# Patient Record
Sex: FEMALE | Race: WHITE | NOT HISPANIC OR LATINO | Employment: OTHER | URBAN - METROPOLITAN AREA
[De-identification: names, ages, dates, MRNs, and addresses within clinical notes are randomized per-mention and may not be internally consistent; named-entity substitution may affect disease eponyms.]

---

## 2017-01-04 ENCOUNTER — COMMUNICATION - HEALTHEAST (OUTPATIENT)
Dept: INTERNAL MEDICINE | Facility: CLINIC | Age: 67
End: 2017-01-04

## 2017-01-04 DIAGNOSIS — F41.9 ANXIETY: ICD-10-CM

## 2017-01-25 ENCOUNTER — OFFICE VISIT - HEALTHEAST (OUTPATIENT)
Dept: INTERNAL MEDICINE | Facility: CLINIC | Age: 67
End: 2017-01-25

## 2017-01-25 DIAGNOSIS — M24.549: ICD-10-CM

## 2017-02-10 ENCOUNTER — COMMUNICATION - HEALTHEAST (OUTPATIENT)
Dept: INTERNAL MEDICINE | Facility: CLINIC | Age: 67
End: 2017-02-10

## 2017-02-10 DIAGNOSIS — I10 ESSENTIAL HYPERTENSION: ICD-10-CM

## 2017-02-18 ENCOUNTER — COMMUNICATION - HEALTHEAST (OUTPATIENT)
Dept: INTERNAL MEDICINE | Facility: CLINIC | Age: 67
End: 2017-02-18

## 2017-02-18 DIAGNOSIS — H69.93 DYSFUNCTION OF BOTH EUSTACHIAN TUBES: ICD-10-CM

## 2017-03-02 ENCOUNTER — RECORDS - HEALTHEAST (OUTPATIENT)
Dept: ADMINISTRATIVE | Facility: OTHER | Age: 67
End: 2017-03-02

## 2017-03-10 ENCOUNTER — COMMUNICATION - HEALTHEAST (OUTPATIENT)
Dept: INTERNAL MEDICINE | Facility: CLINIC | Age: 67
End: 2017-03-10

## 2017-03-10 DIAGNOSIS — E11.9 DIABETES MELLITUS TYPE 2, CONTROLLED (H): ICD-10-CM

## 2017-04-05 ENCOUNTER — RECORDS - HEALTHEAST (OUTPATIENT)
Dept: ADMINISTRATIVE | Facility: OTHER | Age: 67
End: 2017-04-05

## 2017-05-30 ENCOUNTER — COMMUNICATION - HEALTHEAST (OUTPATIENT)
Dept: INTERNAL MEDICINE | Facility: CLINIC | Age: 67
End: 2017-05-30

## 2017-05-30 DIAGNOSIS — E11.9 TYPE II DIABETES MELLITUS (H): ICD-10-CM

## 2017-06-05 ENCOUNTER — OFFICE VISIT - HEALTHEAST (OUTPATIENT)
Dept: INTERNAL MEDICINE | Facility: CLINIC | Age: 67
End: 2017-06-05

## 2017-06-05 DIAGNOSIS — I10 ESSENTIAL HYPERTENSION: ICD-10-CM

## 2017-06-05 DIAGNOSIS — E78.00 PURE HYPERCHOLESTEROLEMIA: ICD-10-CM

## 2017-06-05 DIAGNOSIS — E11.9 TYPE 2 DIABETES MELLITUS WITHOUT COMPLICATION (H): ICD-10-CM

## 2017-06-05 DIAGNOSIS — E89.0 OTHER POSTABLATIVE HYPOTHYROIDISM: ICD-10-CM

## 2017-06-05 LAB — HBA1C MFR BLD: 5.9 % (ref 3.5–6)

## 2017-07-11 ENCOUNTER — COMMUNICATION - HEALTHEAST (OUTPATIENT)
Dept: INTERNAL MEDICINE | Facility: CLINIC | Age: 67
End: 2017-07-11

## 2017-07-11 DIAGNOSIS — E11.9 DIABETES MELLITUS TYPE 2, CONTROLLED (H): ICD-10-CM

## 2017-07-21 ENCOUNTER — COMMUNICATION - HEALTHEAST (OUTPATIENT)
Dept: INTERNAL MEDICINE | Facility: CLINIC | Age: 67
End: 2017-07-21

## 2017-07-21 DIAGNOSIS — E03.9 HYPOTHYROIDISM: ICD-10-CM

## 2017-07-28 ENCOUNTER — HOSPITAL ENCOUNTER (OUTPATIENT)
Dept: MAMMOGRAPHY | Facility: HOSPITAL | Age: 67
Discharge: HOME | End: 2017-07-28
Attending: INTERNAL MEDICINE

## 2017-07-28 DIAGNOSIS — Z12.31 VISIT FOR SCREENING MAMMOGRAM: ICD-10-CM

## 2017-08-10 ENCOUNTER — RECORDS - HEALTHEAST (OUTPATIENT)
Dept: ADMINISTRATIVE | Facility: OTHER | Age: 67
End: 2017-08-10

## 2017-08-17 ENCOUNTER — COMMUNICATION - HEALTHEAST (OUTPATIENT)
Dept: INTERNAL MEDICINE | Facility: CLINIC | Age: 67
End: 2017-08-17

## 2017-08-17 DIAGNOSIS — E03.9 HYPOTHYROID: ICD-10-CM

## 2017-10-02 ENCOUNTER — RECORDS - HEALTHEAST (OUTPATIENT)
Dept: ADMINISTRATIVE | Facility: OTHER | Age: 67
End: 2017-10-02

## 2017-10-05 ENCOUNTER — OFFICE VISIT - HEALTHEAST (OUTPATIENT)
Dept: INTERNAL MEDICINE | Facility: CLINIC | Age: 67
End: 2017-10-05

## 2017-10-05 DIAGNOSIS — I10 ESSENTIAL HYPERTENSION: ICD-10-CM

## 2017-10-05 DIAGNOSIS — Z00.00 HEALTH CARE MAINTENANCE: ICD-10-CM

## 2017-10-05 DIAGNOSIS — R73.9 HYPERGLYCEMIA: ICD-10-CM

## 2017-10-05 DIAGNOSIS — Z78.0 POSTMENOPAUSAL: ICD-10-CM

## 2017-10-05 DIAGNOSIS — Z51.81 MEDICATION MONITORING ENCOUNTER: ICD-10-CM

## 2017-10-05 DIAGNOSIS — M85.80 OSTEOPENIA, UNSPECIFIED LOCATION: ICD-10-CM

## 2017-10-05 DIAGNOSIS — E78.00 HYPERCHOLESTEROLEMIA: ICD-10-CM

## 2017-10-05 DIAGNOSIS — E03.9 HYPOTHYROIDISM: ICD-10-CM

## 2017-10-05 LAB
CHOLEST SERPL-MCNC: 181 MG/DL
FASTING STATUS PATIENT QL REPORTED: YES
HBA1C MFR BLD: 5.8 % (ref 3.5–6)
HDLC SERPL-MCNC: 86 MG/DL
LDLC SERPL CALC-MCNC: 85 MG/DL
TRIGL SERPL-MCNC: 49 MG/DL

## 2017-10-05 ASSESSMENT — MIFFLIN-ST. JEOR: SCORE: 987.83

## 2017-10-06 ENCOUNTER — COMMUNICATION - HEALTHEAST (OUTPATIENT)
Dept: INTERNAL MEDICINE | Facility: CLINIC | Age: 67
End: 2017-10-06

## 2017-10-10 ENCOUNTER — COMMUNICATION - HEALTHEAST (OUTPATIENT)
Dept: INTERNAL MEDICINE | Facility: CLINIC | Age: 67
End: 2017-10-10

## 2017-10-10 DIAGNOSIS — E11.9 DIABETES MELLITUS TYPE 2, CONTROLLED (H): ICD-10-CM

## 2017-10-13 ENCOUNTER — COMMUNICATION - HEALTHEAST (OUTPATIENT)
Dept: INTERNAL MEDICINE | Facility: CLINIC | Age: 67
End: 2017-10-13

## 2017-10-25 ENCOUNTER — RECORDS - HEALTHEAST (OUTPATIENT)
Dept: ADMINISTRATIVE | Facility: OTHER | Age: 67
End: 2017-10-25

## 2017-11-16 ENCOUNTER — OFFICE VISIT - HEALTHEAST (OUTPATIENT)
Dept: INTERNAL MEDICINE | Facility: CLINIC | Age: 67
End: 2017-11-16

## 2017-11-16 DIAGNOSIS — I10 ESSENTIAL HYPERTENSION: ICD-10-CM

## 2017-11-16 DIAGNOSIS — E03.9 HYPOTHYROID: ICD-10-CM

## 2017-11-16 DIAGNOSIS — E11.9 TYPE 2 DIABETES MELLITUS WITHOUT COMPLICATION, WITHOUT LONG-TERM CURRENT USE OF INSULIN (H): ICD-10-CM

## 2017-11-16 DIAGNOSIS — Z51.81 MEDICATION MONITORING ENCOUNTER: ICD-10-CM

## 2017-11-16 DIAGNOSIS — M81.0 OSTEOPOROSIS, UNSPECIFIED OSTEOPOROSIS TYPE, UNSPECIFIED PATHOLOGICAL FRACTURE PRESENCE: ICD-10-CM

## 2017-11-16 ASSESSMENT — MIFFLIN-ST. JEOR: SCORE: 996.9

## 2017-11-17 ENCOUNTER — COMMUNICATION - HEALTHEAST (OUTPATIENT)
Dept: INTERNAL MEDICINE | Facility: CLINIC | Age: 67
End: 2017-11-17

## 2017-11-22 ENCOUNTER — COMMUNICATION - HEALTHEAST (OUTPATIENT)
Dept: INTERNAL MEDICINE | Facility: CLINIC | Age: 67
End: 2017-11-22

## 2017-11-22 DIAGNOSIS — J30.9 ALLERGIC RHINITIS: ICD-10-CM

## 2017-11-25 ENCOUNTER — COMMUNICATION - HEALTHEAST (OUTPATIENT)
Dept: INTERNAL MEDICINE | Facility: CLINIC | Age: 67
End: 2017-11-25

## 2017-11-25 DIAGNOSIS — I10 ESSENTIAL HYPERTENSION: ICD-10-CM

## 2017-12-03 ENCOUNTER — COMMUNICATION - HEALTHEAST (OUTPATIENT)
Dept: INTERNAL MEDICINE | Facility: CLINIC | Age: 67
End: 2017-12-03

## 2017-12-03 DIAGNOSIS — E78.5 HYPERLIPIDEMIA: ICD-10-CM

## 2017-12-04 ENCOUNTER — OFFICE VISIT - HEALTHEAST (OUTPATIENT)
Dept: INTERNAL MEDICINE | Facility: CLINIC | Age: 67
End: 2017-12-04

## 2017-12-04 DIAGNOSIS — J01.91 ACUTE RECURRENT SINUSITIS, UNSPECIFIED LOCATION: ICD-10-CM

## 2017-12-04 DIAGNOSIS — I10 ESSENTIAL HYPERTENSION: ICD-10-CM

## 2017-12-04 DIAGNOSIS — Z51.81 MEDICATION MONITORING ENCOUNTER: ICD-10-CM

## 2017-12-04 DIAGNOSIS — E03.9 HYPOTHYROIDISM, UNSPECIFIED TYPE: ICD-10-CM

## 2017-12-04 ASSESSMENT — MIFFLIN-ST. JEOR: SCORE: 992.36

## 2017-12-05 ENCOUNTER — COMMUNICATION - HEALTHEAST (OUTPATIENT)
Dept: INTERNAL MEDICINE | Facility: CLINIC | Age: 67
End: 2017-12-05

## 2017-12-07 ENCOUNTER — COMMUNICATION - HEALTHEAST (OUTPATIENT)
Dept: INTERNAL MEDICINE | Facility: CLINIC | Age: 67
End: 2017-12-07

## 2018-01-08 ENCOUNTER — COMMUNICATION - HEALTHEAST (OUTPATIENT)
Dept: INTERNAL MEDICINE | Facility: CLINIC | Age: 68
End: 2018-01-08

## 2018-01-08 ENCOUNTER — OFFICE VISIT - HEALTHEAST (OUTPATIENT)
Dept: INTERNAL MEDICINE | Facility: CLINIC | Age: 68
End: 2018-01-08

## 2018-01-08 DIAGNOSIS — E03.9 HYPOTHYROID: ICD-10-CM

## 2018-01-08 DIAGNOSIS — E03.9 HYPOTHYROIDISM, UNSPECIFIED TYPE: ICD-10-CM

## 2018-01-08 DIAGNOSIS — I10 ESSENTIAL HYPERTENSION: ICD-10-CM

## 2018-01-08 DIAGNOSIS — Z51.81 MEDICATION MONITORING ENCOUNTER: ICD-10-CM

## 2018-01-08 DIAGNOSIS — K21.00 REFLUX ESOPHAGITIS: ICD-10-CM

## 2018-01-08 DIAGNOSIS — Z01.818 PREOP EXAMINATION: ICD-10-CM

## 2018-01-08 DIAGNOSIS — E11.9 TYPE II DIABETES MELLITUS (H): ICD-10-CM

## 2018-01-08 DIAGNOSIS — M81.0 OSTEOPOROSIS, UNSPECIFIED OSTEOPOROSIS TYPE, UNSPECIFIED PATHOLOGICAL FRACTURE PRESENCE: ICD-10-CM

## 2018-01-08 DIAGNOSIS — H26.9 CATARACT OF BOTH EYES, UNSPECIFIED CATARACT TYPE: ICD-10-CM

## 2018-01-08 LAB
ALBUMIN SERPL-MCNC: 3.8 G/DL (ref 3.5–5)
ALP SERPL-CCNC: 64 U/L (ref 45–120)
ALT SERPL W P-5'-P-CCNC: 25 U/L (ref 0–45)
ANION GAP SERPL CALCULATED.3IONS-SCNC: 7 MMOL/L (ref 5–18)
AST SERPL W P-5'-P-CCNC: 25 U/L (ref 0–40)
BILIRUB SERPL-MCNC: 0.5 MG/DL (ref 0–1)
BUN SERPL-MCNC: 11 MG/DL (ref 8–22)
CALCIUM SERPL-MCNC: 10 MG/DL (ref 8.5–10.5)
CHLORIDE BLD-SCNC: 98 MMOL/L (ref 98–107)
CO2 SERPL-SCNC: 30 MMOL/L (ref 22–31)
CREAT SERPL-MCNC: 0.74 MG/DL (ref 0.6–1.1)
GFR SERPL CREATININE-BSD FRML MDRD: >60 ML/MIN/1.73M2
GLUCOSE BLD-MCNC: 108 MG/DL (ref 70–125)
HBA1C MFR BLD: 5.9 % (ref 3.5–6)
POTASSIUM BLD-SCNC: 4.2 MMOL/L (ref 3.5–5)
PROT SERPL-MCNC: 7 G/DL (ref 6–8)
SODIUM SERPL-SCNC: 135 MMOL/L (ref 136–145)
TSH SERPL DL<=0.005 MIU/L-ACNC: 0.05 UIU/ML (ref 0.3–5)

## 2018-01-08 ASSESSMENT — MIFFLIN-ST. JEOR: SCORE: 983.29

## 2018-01-23 ENCOUNTER — RECORDS - HEALTHEAST (OUTPATIENT)
Dept: ADMINISTRATIVE | Facility: OTHER | Age: 68
End: 2018-01-23

## 2018-02-21 ENCOUNTER — OFFICE VISIT - HEALTHEAST (OUTPATIENT)
Dept: INTERNAL MEDICINE | Facility: CLINIC | Age: 68
End: 2018-02-21

## 2018-02-21 DIAGNOSIS — Z01.818 PREOP EXAMINATION: ICD-10-CM

## 2018-02-21 DIAGNOSIS — H26.9 CATARACT OF BOTH EYES, UNSPECIFIED CATARACT TYPE: ICD-10-CM

## 2018-02-21 DIAGNOSIS — E03.9 HYPOTHYROIDISM, UNSPECIFIED TYPE: ICD-10-CM

## 2018-02-21 DIAGNOSIS — E11.9 TYPE 2 DIABETES MELLITUS WITHOUT COMPLICATION, WITHOUT LONG-TERM CURRENT USE OF INSULIN (H): ICD-10-CM

## 2018-02-21 DIAGNOSIS — Z51.81 MEDICATION MONITORING ENCOUNTER: ICD-10-CM

## 2018-02-21 DIAGNOSIS — I10 ESSENTIAL HYPERTENSION: ICD-10-CM

## 2018-02-21 LAB
ANION GAP SERPL CALCULATED.3IONS-SCNC: 8 MMOL/L (ref 5–18)
BUN SERPL-MCNC: 10 MG/DL (ref 8–28)
CALCIUM SERPL-MCNC: 9.4 MG/DL (ref 8.5–10.5)
CHLORIDE BLD-SCNC: 102 MMOL/L (ref 98–107)
CO2 SERPL-SCNC: 29 MMOL/L (ref 22–31)
CREAT SERPL-MCNC: 0.77 MG/DL (ref 0.6–1.1)
GFR SERPL CREATININE-BSD FRML MDRD: >60 ML/MIN/1.73M2
GLUCOSE BLD-MCNC: 135 MG/DL (ref 70–125)
POTASSIUM BLD-SCNC: 4.3 MMOL/L (ref 3.5–5)
SODIUM SERPL-SCNC: 139 MMOL/L (ref 136–145)
TSH SERPL DL<=0.005 MIU/L-ACNC: 0.71 UIU/ML (ref 0.3–5)

## 2018-02-21 ASSESSMENT — MIFFLIN-ST. JEOR: SCORE: 987.83

## 2018-02-22 ENCOUNTER — COMMUNICATION - HEALTHEAST (OUTPATIENT)
Dept: INTERNAL MEDICINE | Facility: CLINIC | Age: 68
End: 2018-02-22

## 2018-02-23 ENCOUNTER — COMMUNICATION - HEALTHEAST (OUTPATIENT)
Dept: INTERNAL MEDICINE | Facility: CLINIC | Age: 68
End: 2018-02-23

## 2018-02-28 ENCOUNTER — COMMUNICATION - HEALTHEAST (OUTPATIENT)
Dept: INTERNAL MEDICINE | Facility: CLINIC | Age: 68
End: 2018-02-28

## 2018-02-28 DIAGNOSIS — H69.93 DYSFUNCTION OF BOTH EUSTACHIAN TUBES: ICD-10-CM

## 2018-03-15 ENCOUNTER — RECORDS - HEALTHEAST (OUTPATIENT)
Dept: ADMINISTRATIVE | Facility: OTHER | Age: 68
End: 2018-03-15

## 2018-03-30 ENCOUNTER — COMMUNICATION - HEALTHEAST (OUTPATIENT)
Dept: INTERNAL MEDICINE | Facility: CLINIC | Age: 68
End: 2018-03-30

## 2018-03-30 DIAGNOSIS — I10 ESSENTIAL HYPERTENSION: ICD-10-CM

## 2018-04-05 ENCOUNTER — RECORDS - HEALTHEAST (OUTPATIENT)
Dept: ADMINISTRATIVE | Facility: OTHER | Age: 68
End: 2018-04-05

## 2018-04-20 ENCOUNTER — COMMUNICATION - HEALTHEAST (OUTPATIENT)
Dept: INTERNAL MEDICINE | Facility: CLINIC | Age: 68
End: 2018-04-20

## 2018-04-20 DIAGNOSIS — E11.9 DIABETES MELLITUS TYPE 2, CONTROLLED (H): ICD-10-CM

## 2018-04-26 ENCOUNTER — RECORDS - HEALTHEAST (OUTPATIENT)
Dept: ADMINISTRATIVE | Facility: OTHER | Age: 68
End: 2018-04-26

## 2018-05-23 ENCOUNTER — COMMUNICATION - HEALTHEAST (OUTPATIENT)
Dept: INTERNAL MEDICINE | Facility: CLINIC | Age: 68
End: 2018-05-23

## 2018-06-05 ENCOUNTER — OFFICE VISIT - HEALTHEAST (OUTPATIENT)
Dept: INTERNAL MEDICINE | Facility: CLINIC | Age: 68
End: 2018-06-05

## 2018-06-05 DIAGNOSIS — Z87.09 HISTORY OF CHRONIC SINUSITIS: ICD-10-CM

## 2018-06-05 DIAGNOSIS — Z51.81 MEDICATION MONITORING ENCOUNTER: ICD-10-CM

## 2018-06-05 DIAGNOSIS — E03.9 HYPOTHYROIDISM, UNSPECIFIED TYPE: ICD-10-CM

## 2018-06-05 DIAGNOSIS — I10 ESSENTIAL HYPERTENSION: ICD-10-CM

## 2018-06-05 DIAGNOSIS — M85.80 OSTEOPENIA, UNSPECIFIED LOCATION: ICD-10-CM

## 2018-06-05 DIAGNOSIS — E11.9 TYPE 2 DIABETES MELLITUS WITHOUT COMPLICATION, WITHOUT LONG-TERM CURRENT USE OF INSULIN (H): ICD-10-CM

## 2018-06-05 DIAGNOSIS — E78.00 HYPERCHOLESTEROLEMIA: ICD-10-CM

## 2018-06-05 LAB
ALBUMIN SERPL-MCNC: 3.9 G/DL (ref 3.5–5)
ALP SERPL-CCNC: 81 U/L (ref 45–120)
ALT SERPL W P-5'-P-CCNC: 21 U/L (ref 0–45)
ANION GAP SERPL CALCULATED.3IONS-SCNC: 10 MMOL/L (ref 5–18)
AST SERPL W P-5'-P-CCNC: 26 U/L (ref 0–40)
BILIRUB SERPL-MCNC: 0.4 MG/DL (ref 0–1)
BUN SERPL-MCNC: 13 MG/DL (ref 8–28)
CALCIUM SERPL-MCNC: 10.1 MG/DL (ref 8.5–10.5)
CHLORIDE BLD-SCNC: 97 MMOL/L (ref 98–107)
CO2 SERPL-SCNC: 29 MMOL/L (ref 22–31)
CREAT SERPL-MCNC: 0.77 MG/DL (ref 0.6–1.1)
CREAT UR-MCNC: 23.9 MG/DL
GFR SERPL CREATININE-BSD FRML MDRD: >60 ML/MIN/1.73M2
GLUCOSE BLD-MCNC: 123 MG/DL (ref 70–125)
HBA1C MFR BLD: 5.6 % (ref 3.5–6)
MICROALBUMIN UR-MCNC: <0.5 MG/DL (ref 0–1.99)
MICROALBUMIN/CREAT UR: NORMAL MG/G
POTASSIUM BLD-SCNC: 4.2 MMOL/L (ref 3.5–5)
PROT SERPL-MCNC: 7 G/DL (ref 6–8)
SODIUM SERPL-SCNC: 136 MMOL/L (ref 136–145)
TSH SERPL DL<=0.005 MIU/L-ACNC: 2.17 UIU/ML (ref 0.3–5)

## 2018-06-05 ASSESSMENT — MIFFLIN-ST. JEOR: SCORE: 992.36

## 2018-06-06 ENCOUNTER — COMMUNICATION - HEALTHEAST (OUTPATIENT)
Dept: INTERNAL MEDICINE | Facility: CLINIC | Age: 68
End: 2018-06-06

## 2018-06-14 ENCOUNTER — RECORDS - HEALTHEAST (OUTPATIENT)
Dept: ADMINISTRATIVE | Facility: OTHER | Age: 68
End: 2018-06-14

## 2018-07-09 ENCOUNTER — RECORDS - HEALTHEAST (OUTPATIENT)
Dept: ADMINISTRATIVE | Facility: OTHER | Age: 68
End: 2018-07-09

## 2018-07-18 ENCOUNTER — RECORDS - HEALTHEAST (OUTPATIENT)
Dept: ADMINISTRATIVE | Facility: OTHER | Age: 68
End: 2018-07-18

## 2018-07-27 ENCOUNTER — COMMUNICATION - HEALTHEAST (OUTPATIENT)
Dept: INTERNAL MEDICINE | Facility: CLINIC | Age: 68
End: 2018-07-27

## 2018-07-27 DIAGNOSIS — I10 ESSENTIAL HYPERTENSION: ICD-10-CM

## 2018-07-27 DIAGNOSIS — K21.9 GERD (GASTROESOPHAGEAL REFLUX DISEASE): ICD-10-CM

## 2018-07-31 ENCOUNTER — HOSPITAL ENCOUNTER (OUTPATIENT)
Dept: MAMMOGRAPHY | Facility: CLINIC | Age: 68
Discharge: HOME | End: 2018-07-31
Attending: INTERNAL MEDICINE

## 2018-07-31 DIAGNOSIS — Z12.31 VISIT FOR SCREENING MAMMOGRAM: ICD-10-CM

## 2018-08-17 ENCOUNTER — COMMUNICATION - HEALTHEAST (OUTPATIENT)
Dept: INTERNAL MEDICINE | Facility: CLINIC | Age: 68
End: 2018-08-17

## 2018-08-17 DIAGNOSIS — E03.9 HYPOTHYROIDISM, UNSPECIFIED TYPE: ICD-10-CM

## 2018-09-13 ENCOUNTER — COMMUNICATION - HEALTHEAST (OUTPATIENT)
Dept: INTERNAL MEDICINE | Facility: CLINIC | Age: 68
End: 2018-09-13

## 2018-09-13 DIAGNOSIS — H69.93 DYSFUNCTION OF BOTH EUSTACHIAN TUBES: ICD-10-CM

## 2018-09-13 RX ORDER — MONTELUKAST SODIUM 10 MG/1
TABLET ORAL
Qty: 90 TABLET | Refills: 3 | Status: SHIPPED | OUTPATIENT
Start: 2018-09-13

## 2018-10-24 ENCOUNTER — RECORDS - HEALTHEAST (OUTPATIENT)
Dept: ADMINISTRATIVE | Facility: OTHER | Age: 68
End: 2018-10-24

## 2018-10-30 ENCOUNTER — COMMUNICATION - HEALTHEAST (OUTPATIENT)
Dept: INTERNAL MEDICINE | Facility: CLINIC | Age: 68
End: 2018-10-30

## 2018-10-30 ENCOUNTER — OFFICE VISIT - HEALTHEAST (OUTPATIENT)
Dept: INTERNAL MEDICINE | Facility: CLINIC | Age: 68
End: 2018-10-30

## 2018-10-30 DIAGNOSIS — Z23 NEED FOR INFLUENZA VACCINATION: ICD-10-CM

## 2018-10-30 DIAGNOSIS — E78.00 PURE HYPERCHOLESTEROLEMIA: ICD-10-CM

## 2018-10-30 DIAGNOSIS — E03.9 HYPOTHYROIDISM, UNSPECIFIED TYPE: ICD-10-CM

## 2018-10-30 DIAGNOSIS — E11.9 TYPE 2 DIABETES MELLITUS WITHOUT COMPLICATION, WITHOUT LONG-TERM CURRENT USE OF INSULIN (H): ICD-10-CM

## 2018-10-30 DIAGNOSIS — Z51.81 MEDICATION MONITORING ENCOUNTER: ICD-10-CM

## 2018-10-30 DIAGNOSIS — K21.9 GASTROESOPHAGEAL REFLUX DISEASE, ESOPHAGITIS PRESENCE NOT SPECIFIED: ICD-10-CM

## 2018-10-30 DIAGNOSIS — I10 ESSENTIAL HYPERTENSION: ICD-10-CM

## 2018-10-30 LAB
ALBUMIN SERPL-MCNC: 3.9 G/DL (ref 3.5–5)
ALP SERPL-CCNC: 79 U/L (ref 45–120)
ALT SERPL W P-5'-P-CCNC: 19 U/L (ref 0–45)
ANION GAP SERPL CALCULATED.3IONS-SCNC: 9 MMOL/L (ref 5–18)
AST SERPL W P-5'-P-CCNC: 25 U/L (ref 0–40)
BILIRUB SERPL-MCNC: 0.4 MG/DL (ref 0–1)
BUN SERPL-MCNC: 13 MG/DL (ref 8–28)
CALCIUM SERPL-MCNC: 9.9 MG/DL (ref 8.5–10.5)
CHLORIDE BLD-SCNC: 99 MMOL/L (ref 98–107)
CO2 SERPL-SCNC: 28 MMOL/L (ref 22–31)
CREAT SERPL-MCNC: 0.84 MG/DL (ref 0.6–1.1)
GFR SERPL CREATININE-BSD FRML MDRD: >60 ML/MIN/1.73M2
GLUCOSE BLD-MCNC: 171 MG/DL (ref 70–125)
HBA1C MFR BLD: 6 % (ref 3.5–6)
POTASSIUM BLD-SCNC: 4 MMOL/L (ref 3.5–5)
PROT SERPL-MCNC: 6.8 G/DL (ref 6–8)
SODIUM SERPL-SCNC: 136 MMOL/L (ref 136–145)
TSH SERPL DL<=0.005 MIU/L-ACNC: 3.35 UIU/ML (ref 0.3–5)

## 2019-02-06 ENCOUNTER — COMMUNICATION - HEALTHEAST (OUTPATIENT)
Dept: INTERNAL MEDICINE | Facility: CLINIC | Age: 69
End: 2019-02-06

## 2019-02-06 DIAGNOSIS — E78.5 HYPERLIPIDEMIA: ICD-10-CM

## 2019-02-19 ENCOUNTER — OFFICE VISIT - HEALTHEAST (OUTPATIENT)
Dept: INTERNAL MEDICINE | Facility: CLINIC | Age: 69
End: 2019-02-19

## 2019-02-19 DIAGNOSIS — R53.83 FATIGUE: ICD-10-CM

## 2019-02-19 DIAGNOSIS — J02.0 STREPTOCOCCAL SORE THROAT: ICD-10-CM

## 2019-02-19 LAB
DEPRECATED S PYO AG THROAT QL EIA: NORMAL
FLUAV AG SPEC QL IA: NORMAL
FLUBV AG SPEC QL IA: NORMAL

## 2019-02-20 LAB — GROUP A STREP BY PCR: NORMAL

## 2019-03-19 ENCOUNTER — COMMUNICATION - HEALTHEAST (OUTPATIENT)
Dept: INTERNAL MEDICINE | Facility: CLINIC | Age: 69
End: 2019-03-19

## 2019-03-19 DIAGNOSIS — E11.9 TYPE II DIABETES MELLITUS (H): ICD-10-CM

## 2019-05-01 ENCOUNTER — COMMUNICATION - HEALTHEAST (OUTPATIENT)
Dept: INTERNAL MEDICINE | Facility: CLINIC | Age: 69
End: 2019-05-01

## 2019-05-01 DIAGNOSIS — E03.9 HYPOTHYROIDISM, UNSPECIFIED TYPE: ICD-10-CM

## 2019-07-15 ENCOUNTER — RECORDS - HEALTHEAST (OUTPATIENT)
Dept: ADMINISTRATIVE | Facility: OTHER | Age: 69
End: 2019-07-15

## 2019-07-22 ENCOUNTER — RECORDS - HEALTHEAST (OUTPATIENT)
Dept: HEALTH INFORMATION MANAGEMENT | Facility: CLINIC | Age: 69
End: 2019-07-22

## 2019-08-21 ENCOUNTER — AMBULATORY - HEALTHEAST (OUTPATIENT)
Dept: SCHEDULING | Facility: CLINIC | Age: 69
End: 2019-08-21

## 2019-08-21 ENCOUNTER — OFFICE VISIT - HEALTHEAST (OUTPATIENT)
Dept: INTERNAL MEDICINE | Facility: CLINIC | Age: 69
End: 2019-08-21

## 2019-08-21 DIAGNOSIS — I10 ESSENTIAL HYPERTENSION: ICD-10-CM

## 2019-08-21 DIAGNOSIS — R41.3 MEMORY LOSS: ICD-10-CM

## 2019-08-21 DIAGNOSIS — Z51.81 MEDICATION MONITORING ENCOUNTER: ICD-10-CM

## 2019-08-21 DIAGNOSIS — E78.00 PURE HYPERCHOLESTEROLEMIA: ICD-10-CM

## 2019-08-21 DIAGNOSIS — E11.9 TYPE 2 DIABETES MELLITUS WITHOUT COMPLICATION, WITHOUT LONG-TERM CURRENT USE OF INSULIN (H): ICD-10-CM

## 2019-08-21 DIAGNOSIS — Z12.31 VISIT FOR SCREENING MAMMOGRAM: ICD-10-CM

## 2019-08-21 DIAGNOSIS — R51.9 NONINTRACTABLE HEADACHE, UNSPECIFIED CHRONICITY PATTERN, UNSPECIFIED HEADACHE TYPE: ICD-10-CM

## 2019-08-21 DIAGNOSIS — E03.9 HYPOTHYROIDISM, UNSPECIFIED TYPE: ICD-10-CM

## 2019-08-21 LAB
ALBUMIN SERPL-MCNC: 3.8 G/DL (ref 3.5–5)
ALP SERPL-CCNC: 72 U/L (ref 45–120)
ALT SERPL W P-5'-P-CCNC: 19 U/L (ref 0–45)
ANION GAP SERPL CALCULATED.3IONS-SCNC: 9 MMOL/L (ref 5–18)
AST SERPL W P-5'-P-CCNC: 23 U/L (ref 0–40)
BILIRUB SERPL-MCNC: 0.4 MG/DL (ref 0–1)
BUN SERPL-MCNC: 12 MG/DL (ref 8–28)
CALCIUM SERPL-MCNC: 9.8 MG/DL (ref 8.5–10.5)
CHLORIDE BLD-SCNC: 102 MMOL/L (ref 98–107)
CO2 SERPL-SCNC: 28 MMOL/L (ref 22–31)
CREAT SERPL-MCNC: 0.85 MG/DL (ref 0.6–1.1)
GFR SERPL CREATININE-BSD FRML MDRD: >60 ML/MIN/1.73M2
GLUCOSE BLD-MCNC: 139 MG/DL (ref 70–125)
HBA1C MFR BLD: 6 % (ref 3.5–6)
POTASSIUM BLD-SCNC: 4.4 MMOL/L (ref 3.5–5)
PROT SERPL-MCNC: 6.8 G/DL (ref 6–8)
SODIUM SERPL-SCNC: 139 MMOL/L (ref 136–145)
TSH SERPL DL<=0.005 MIU/L-ACNC: 0.9 UIU/ML (ref 0.3–5)
VIT B12 SERPL-MCNC: 689 PG/ML (ref 213–816)

## 2019-08-22 ENCOUNTER — AMBULATORY - HEALTHEAST (OUTPATIENT)
Dept: ADMINISTRATIVE | Facility: CLINIC | Age: 69
End: 2019-08-22

## 2019-08-22 ENCOUNTER — COMMUNICATION - HEALTHEAST (OUTPATIENT)
Dept: INTERNAL MEDICINE | Facility: CLINIC | Age: 69
End: 2019-08-22

## 2019-08-22 DIAGNOSIS — Z12.31 VISIT FOR SCREENING MAMMOGRAM: ICD-10-CM

## 2019-08-23 ENCOUNTER — COMMUNICATION - HEALTHEAST (OUTPATIENT)
Dept: INTERNAL MEDICINE | Facility: CLINIC | Age: 69
End: 2019-08-23

## 2019-08-23 DIAGNOSIS — E03.9 HYPOTHYROIDISM, UNSPECIFIED TYPE: ICD-10-CM

## 2019-08-24 ENCOUNTER — COMMUNICATION - HEALTHEAST (OUTPATIENT)
Dept: INTERNAL MEDICINE | Facility: CLINIC | Age: 69
End: 2019-08-24

## 2019-08-24 DIAGNOSIS — I10 ESSENTIAL HYPERTENSION: ICD-10-CM

## 2019-08-28 ENCOUNTER — RECORDS - HEALTHEAST (OUTPATIENT)
Dept: ADMINISTRATIVE | Facility: OTHER | Age: 69
End: 2019-08-28

## 2019-08-29 ENCOUNTER — AMBULATORY - HEALTHEAST (OUTPATIENT)
Dept: ADMINISTRATIVE | Facility: CLINIC | Age: 69
End: 2019-08-29

## 2019-08-29 ENCOUNTER — RECORDS - HEALTHEAST (OUTPATIENT)
Dept: ADMINISTRATIVE | Facility: OTHER | Age: 69
End: 2019-08-29

## 2019-08-29 ENCOUNTER — COMMUNICATION - HEALTHEAST (OUTPATIENT)
Dept: INTERNAL MEDICINE | Facility: CLINIC | Age: 69
End: 2019-08-29

## 2019-08-29 DIAGNOSIS — R90.89 ABNORMAL BRAIN MRI: ICD-10-CM

## 2019-09-03 ENCOUNTER — RECORDS - HEALTHEAST (OUTPATIENT)
Dept: ADMINISTRATIVE | Facility: OTHER | Age: 69
End: 2019-09-03

## 2019-09-12 ENCOUNTER — COMMUNICATION - HEALTHEAST (OUTPATIENT)
Dept: INTERNAL MEDICINE | Facility: CLINIC | Age: 69
End: 2019-09-12

## 2019-09-12 DIAGNOSIS — E11.9 TYPE 2 DIABETES MELLITUS WITHOUT COMPLICATION, WITHOUT LONG-TERM CURRENT USE OF INSULIN (H): ICD-10-CM

## 2019-10-09 ENCOUNTER — HOSPITAL ENCOUNTER (OUTPATIENT)
Dept: NEUROLOGY | Facility: CLINIC | Age: 69
Setting detail: THERAPIES SERIES
Discharge: STILL ADMITTED OR ADMINISTRATIVE | End: 2019-10-09
Attending: INTERNAL MEDICINE

## 2019-10-09 DIAGNOSIS — R41.3 MEMORY LOSS: ICD-10-CM

## 2019-10-09 DIAGNOSIS — F03.90 MAJOR NEUROCOGNITIVE DISORDER DUE TO ALZHEIMER'S DISEASE, POSSIBLE: ICD-10-CM

## 2019-10-16 ENCOUNTER — AMBULATORY - HEALTHEAST (OUTPATIENT)
Dept: NURSING | Facility: CLINIC | Age: 69
End: 2019-10-16

## 2019-10-23 ENCOUNTER — OFFICE VISIT - HEALTHEAST (OUTPATIENT)
Dept: INTERNAL MEDICINE | Facility: CLINIC | Age: 69
End: 2019-10-23

## 2019-10-23 DIAGNOSIS — Z51.81 MEDICATION MONITORING ENCOUNTER: ICD-10-CM

## 2019-10-23 DIAGNOSIS — I10 ESSENTIAL HYPERTENSION: ICD-10-CM

## 2019-10-23 DIAGNOSIS — E78.00 PURE HYPERCHOLESTEROLEMIA: ICD-10-CM

## 2019-10-23 DIAGNOSIS — E03.9 HYPOTHYROIDISM, UNSPECIFIED TYPE: ICD-10-CM

## 2019-10-23 DIAGNOSIS — R41.3 MEMORY LOSS: ICD-10-CM

## 2019-10-23 DIAGNOSIS — E11.9 TYPE 2 DIABETES MELLITUS WITHOUT COMPLICATION, WITHOUT LONG-TERM CURRENT USE OF INSULIN (H): ICD-10-CM

## 2019-10-23 LAB
ALBUMIN SERPL-MCNC: 3.8 G/DL (ref 3.5–5)
ALP SERPL-CCNC: 77 U/L (ref 45–120)
ALT SERPL W P-5'-P-CCNC: 13 U/L (ref 0–45)
ANION GAP SERPL CALCULATED.3IONS-SCNC: 10 MMOL/L (ref 5–18)
AST SERPL W P-5'-P-CCNC: 19 U/L (ref 0–40)
BILIRUB SERPL-MCNC: 0.4 MG/DL (ref 0–1)
BUN SERPL-MCNC: 13 MG/DL (ref 8–28)
CALCIUM SERPL-MCNC: 9.6 MG/DL (ref 8.5–10.5)
CHLORIDE BLD-SCNC: 99 MMOL/L (ref 98–107)
CO2 SERPL-SCNC: 27 MMOL/L (ref 22–31)
CREAT SERPL-MCNC: 0.88 MG/DL (ref 0.6–1.1)
GFR SERPL CREATININE-BSD FRML MDRD: >60 ML/MIN/1.73M2
GLUCOSE BLD-MCNC: 104 MG/DL (ref 70–125)
HBA1C MFR BLD: 6.1 % (ref 3.5–6)
POTASSIUM BLD-SCNC: 4.3 MMOL/L (ref 3.5–5)
PROT SERPL-MCNC: 6.7 G/DL (ref 6–8)
SODIUM SERPL-SCNC: 136 MMOL/L (ref 136–145)
TSH SERPL DL<=0.005 MIU/L-ACNC: 0.03 UIU/ML (ref 0.3–5)

## 2019-10-24 ENCOUNTER — COMMUNICATION - HEALTHEAST (OUTPATIENT)
Dept: INTERNAL MEDICINE | Facility: CLINIC | Age: 69
End: 2019-10-24

## 2019-10-24 DIAGNOSIS — E03.9 HYPOTHYROIDISM, UNSPECIFIED TYPE: ICD-10-CM

## 2019-11-06 ENCOUNTER — COMMUNICATION - HEALTHEAST (OUTPATIENT)
Dept: INTERNAL MEDICINE | Facility: CLINIC | Age: 69
End: 2019-11-06

## 2019-11-06 DIAGNOSIS — E78.5 HYPERLIPIDEMIA: ICD-10-CM

## 2019-11-10 ENCOUNTER — COMMUNICATION - HEALTHEAST (OUTPATIENT)
Dept: INTERNAL MEDICINE | Facility: CLINIC | Age: 69
End: 2019-11-10

## 2019-11-10 DIAGNOSIS — E11.9 DIABETES MELLITUS TYPE 2, CONTROLLED (H): ICD-10-CM

## 2019-11-10 DIAGNOSIS — K21.9 GASTROESOPHAGEAL REFLUX DISEASE, ESOPHAGITIS PRESENCE NOT SPECIFIED: ICD-10-CM

## 2020-01-29 ENCOUNTER — OFFICE VISIT - HEALTHEAST (OUTPATIENT)
Dept: INTERNAL MEDICINE | Facility: CLINIC | Age: 70
End: 2020-01-29

## 2020-01-29 DIAGNOSIS — R41.3 MEMORY LOSS: ICD-10-CM

## 2020-01-29 DIAGNOSIS — I10 ESSENTIAL HYPERTENSION: ICD-10-CM

## 2020-01-29 DIAGNOSIS — E11.9 TYPE 2 DIABETES MELLITUS WITHOUT COMPLICATION, WITHOUT LONG-TERM CURRENT USE OF INSULIN (H): ICD-10-CM

## 2020-01-29 DIAGNOSIS — E03.9 HYPOTHYROIDISM, UNSPECIFIED TYPE: ICD-10-CM

## 2020-01-29 LAB — TSH SERPL DL<=0.005 MIU/L-ACNC: 0.45 UIU/ML (ref 0.3–5)

## 2020-01-31 ENCOUNTER — COMMUNICATION - HEALTHEAST (OUTPATIENT)
Dept: INTERNAL MEDICINE | Facility: CLINIC | Age: 70
End: 2020-01-31

## 2020-02-18 ENCOUNTER — COMMUNICATION - HEALTHEAST (OUTPATIENT)
Dept: INTERNAL MEDICINE | Facility: CLINIC | Age: 70
End: 2020-02-18

## 2020-02-18 DIAGNOSIS — K21.9 GASTROESOPHAGEAL REFLUX DISEASE, ESOPHAGITIS PRESENCE NOT SPECIFIED: ICD-10-CM

## 2020-02-21 ENCOUNTER — COMMUNICATION - HEALTHEAST (OUTPATIENT)
Dept: INTERNAL MEDICINE | Facility: CLINIC | Age: 70
End: 2020-02-21

## 2020-02-21 DIAGNOSIS — R41.3 MEMORY LOSS: ICD-10-CM

## 2020-05-22 ENCOUNTER — COMMUNICATION - HEALTHEAST (OUTPATIENT)
Dept: INTERNAL MEDICINE | Facility: CLINIC | Age: 70
End: 2020-05-22

## 2020-05-22 DIAGNOSIS — R41.3 MEMORY LOSS: ICD-10-CM

## 2020-07-22 ENCOUNTER — RECORDS - HEALTHEAST (OUTPATIENT)
Dept: ADMINISTRATIVE | Facility: OTHER | Age: 70
End: 2020-07-22

## 2020-07-22 LAB — RETINOPATHY: NEGATIVE

## 2020-07-24 ENCOUNTER — RECORDS - HEALTHEAST (OUTPATIENT)
Dept: HEALTH INFORMATION MANAGEMENT | Facility: CLINIC | Age: 70
End: 2020-07-24

## 2020-08-10 ENCOUNTER — COMMUNICATION - HEALTHEAST (OUTPATIENT)
Dept: NURSING | Facility: CLINIC | Age: 70
End: 2020-08-10

## 2020-08-10 ENCOUNTER — OFFICE VISIT - HEALTHEAST (OUTPATIENT)
Dept: FAMILY MEDICINE | Facility: CLINIC | Age: 70
End: 2020-08-10

## 2020-08-10 ENCOUNTER — COMMUNICATION - HEALTHEAST (OUTPATIENT)
Dept: TELEHEALTH | Facility: CLINIC | Age: 70
End: 2020-08-10

## 2020-08-10 DIAGNOSIS — E03.9 HYPOTHYROIDISM, UNSPECIFIED TYPE: ICD-10-CM

## 2020-08-10 DIAGNOSIS — E78.00 HYPERCHOLESTEROLEMIA: ICD-10-CM

## 2020-08-10 DIAGNOSIS — K21.00 REFLUX ESOPHAGITIS: ICD-10-CM

## 2020-08-10 DIAGNOSIS — R91.1 SOLITARY PULMONARY NODULE: ICD-10-CM

## 2020-08-10 DIAGNOSIS — Z79.899 MEDICATION MANAGEMENT: ICD-10-CM

## 2020-08-10 DIAGNOSIS — F03.90 MAJOR NEUROCOGNITIVE DISORDER DUE TO ALZHEIMER'S DISEASE, POSSIBLE: ICD-10-CM

## 2020-08-10 DIAGNOSIS — I10 ESSENTIAL HYPERTENSION: ICD-10-CM

## 2020-08-10 DIAGNOSIS — E11.9 TYPE 2 DIABETES MELLITUS WITHOUT COMPLICATION, WITHOUT LONG-TERM CURRENT USE OF INSULIN (H): ICD-10-CM

## 2020-08-10 LAB
ALBUMIN SERPL-MCNC: 4.1 G/DL (ref 3.5–5)
ALP SERPL-CCNC: 64 U/L (ref 45–120)
ALT SERPL W P-5'-P-CCNC: 14 U/L (ref 0–45)
ANION GAP SERPL CALCULATED.3IONS-SCNC: 10 MMOL/L (ref 5–18)
AST SERPL W P-5'-P-CCNC: 20 U/L (ref 0–40)
BILIRUB SERPL-MCNC: 0.5 MG/DL (ref 0–1)
BUN SERPL-MCNC: 12 MG/DL (ref 8–28)
CALCIUM SERPL-MCNC: 10.2 MG/DL (ref 8.5–10.5)
CHLORIDE BLD-SCNC: 95 MMOL/L (ref 98–107)
CO2 SERPL-SCNC: 30 MMOL/L (ref 22–31)
CREAT SERPL-MCNC: 0.85 MG/DL (ref 0.6–1.1)
CREAT UR-MCNC: 112 MG/DL
GFR SERPL CREATININE-BSD FRML MDRD: >60 ML/MIN/1.73M2
GLUCOSE BLD-MCNC: 114 MG/DL (ref 70–125)
HBA1C MFR BLD: 5.6 %
MICROALBUMIN UR-MCNC: 3.61 MG/DL (ref 0–1.99)
MICROALBUMIN/CREAT UR: 32.2 MG/G
POTASSIUM BLD-SCNC: 4.7 MMOL/L (ref 3.5–5)
PROT SERPL-MCNC: 7.1 G/DL (ref 6–8)
SODIUM SERPL-SCNC: 135 MMOL/L (ref 136–145)
TSH SERPL DL<=0.005 MIU/L-ACNC: 1.67 UIU/ML (ref 0.3–5)

## 2020-08-13 ENCOUNTER — COMMUNICATION - HEALTHEAST (OUTPATIENT)
Dept: FAMILY MEDICINE | Facility: CLINIC | Age: 70
End: 2020-08-13

## 2020-08-13 ENCOUNTER — COMMUNICATION - HEALTHEAST (OUTPATIENT)
Dept: NURSING | Facility: CLINIC | Age: 70
End: 2020-08-13

## 2020-08-19 ENCOUNTER — COMMUNICATION - HEALTHEAST (OUTPATIENT)
Dept: NURSING | Facility: CLINIC | Age: 70
End: 2020-08-19

## 2020-08-26 ENCOUNTER — COMMUNICATION - HEALTHEAST (OUTPATIENT)
Dept: INTERNAL MEDICINE | Facility: CLINIC | Age: 70
End: 2020-08-26

## 2020-08-27 ENCOUNTER — COMMUNICATION - HEALTHEAST (OUTPATIENT)
Dept: FAMILY MEDICINE | Facility: CLINIC | Age: 70
End: 2020-08-27

## 2020-08-27 DIAGNOSIS — I10 ESSENTIAL HYPERTENSION: ICD-10-CM

## 2020-08-31 ENCOUNTER — HOSPITAL ENCOUNTER (OUTPATIENT)
Dept: CT IMAGING | Facility: HOSPITAL | Age: 70
Discharge: HOME | End: 2020-08-31
Attending: NURSE PRACTITIONER

## 2020-08-31 DIAGNOSIS — R91.1 SOLITARY PULMONARY NODULE: ICD-10-CM

## 2020-08-31 RX ORDER — LISINOPRIL 5 MG/1
5 TABLET ORAL DAILY
Qty: 90 TABLET | Refills: 3 | Status: SHIPPED | OUTPATIENT
Start: 2020-08-31 | End: 2021-08-30

## 2020-09-01 ENCOUNTER — COMMUNICATION - HEALTHEAST (OUTPATIENT)
Dept: FAMILY MEDICINE | Facility: CLINIC | Age: 70
End: 2020-09-01

## 2020-09-03 ENCOUNTER — COMMUNICATION - HEALTHEAST (OUTPATIENT)
Dept: CARE COORDINATION | Facility: CLINIC | Age: 70
End: 2020-09-03

## 2020-09-04 ENCOUNTER — AMBULATORY - HEALTHEAST (OUTPATIENT)
Dept: FAMILY MEDICINE | Facility: CLINIC | Age: 70
End: 2020-09-04

## 2020-09-04 DIAGNOSIS — R93.89 ABNORMAL CHEST CT: ICD-10-CM

## 2020-09-08 ENCOUNTER — COMMUNICATION - HEALTHEAST (OUTPATIENT)
Dept: FAMILY MEDICINE | Facility: CLINIC | Age: 70
End: 2020-09-08

## 2020-09-12 ENCOUNTER — COMMUNICATION - HEALTHEAST (OUTPATIENT)
Dept: FAMILY MEDICINE | Facility: CLINIC | Age: 70
End: 2020-09-12

## 2020-09-14 ENCOUNTER — AMBULATORY - HEALTHEAST (OUTPATIENT)
Dept: FAMILY MEDICINE | Facility: CLINIC | Age: 70
End: 2020-09-14

## 2020-09-14 DIAGNOSIS — E11.9 TYPE 2 DIABETES MELLITUS WITHOUT COMPLICATION, WITHOUT LONG-TERM CURRENT USE OF INSULIN (H): ICD-10-CM

## 2020-09-16 ENCOUNTER — OFFICE VISIT - HEALTHEAST (OUTPATIENT)
Dept: PULMONOLOGY | Facility: OTHER | Age: 70
End: 2020-09-16

## 2020-09-16 DIAGNOSIS — T17.500A MUCUS PLUGGING OF BRONCHI: ICD-10-CM

## 2020-09-16 ASSESSMENT — MIFFLIN-ST. JEOR: SCORE: 973.09

## 2020-09-28 ENCOUNTER — RECORDS - HEALTHEAST (OUTPATIENT)
Dept: ADMINISTRATIVE | Facility: OTHER | Age: 70
End: 2020-09-28

## 2020-09-30 ENCOUNTER — COMMUNICATION - HEALTHEAST (OUTPATIENT)
Dept: INTERNAL MEDICINE | Facility: CLINIC | Age: 70
End: 2020-09-30

## 2020-09-30 DIAGNOSIS — E03.9 HYPOTHYROIDISM, UNSPECIFIED TYPE: ICD-10-CM

## 2020-10-03 RX ORDER — LEVOTHYROXINE SODIUM 50 UG/1
50 TABLET ORAL DAILY
Qty: 90 TABLET | Refills: 3 | Status: SHIPPED | OUTPATIENT
Start: 2020-10-03 | End: 2021-11-19

## 2020-10-05 ENCOUNTER — COMMUNICATION - HEALTHEAST (OUTPATIENT)
Dept: CARE COORDINATION | Facility: CLINIC | Age: 70
End: 2020-10-05

## 2020-10-13 ENCOUNTER — OFFICE VISIT - HEALTHEAST (OUTPATIENT)
Dept: FAMILY MEDICINE | Facility: CLINIC | Age: 70
End: 2020-10-13

## 2020-10-13 ENCOUNTER — HOME CARE/HOSPICE - HEALTHEAST (OUTPATIENT)
Dept: HOME HEALTH SERVICES | Facility: HOME HEALTH | Age: 70
End: 2020-10-13

## 2020-10-13 ENCOUNTER — COMMUNICATION - HEALTHEAST (OUTPATIENT)
Dept: HOME HEALTH SERVICES | Facility: HOME HEALTH | Age: 70
End: 2020-10-13

## 2020-10-13 DIAGNOSIS — J30.9 ALLERGIC RHINITIS, UNSPECIFIED SEASONALITY, UNSPECIFIED TRIGGER: ICD-10-CM

## 2020-10-13 DIAGNOSIS — Z79.899 MEDICATION MANAGEMENT: ICD-10-CM

## 2020-10-13 DIAGNOSIS — E03.9 HYPOTHYROIDISM, UNSPECIFIED TYPE: ICD-10-CM

## 2020-10-13 DIAGNOSIS — E11.9 TYPE 2 DIABETES MELLITUS WITHOUT COMPLICATION, WITHOUT LONG-TERM CURRENT USE OF INSULIN (H): ICD-10-CM

## 2020-10-13 DIAGNOSIS — Z78.0 POSTMENOPAUSAL: ICD-10-CM

## 2020-10-13 DIAGNOSIS — I10 ESSENTIAL HYPERTENSION: ICD-10-CM

## 2020-10-13 DIAGNOSIS — K21.9 GASTROESOPHAGEAL REFLUX DISEASE, UNSPECIFIED WHETHER ESOPHAGITIS PRESENT: ICD-10-CM

## 2020-10-13 DIAGNOSIS — E78.00 PURE HYPERCHOLESTEROLEMIA: ICD-10-CM

## 2020-10-13 DIAGNOSIS — F03.90 MAJOR NEUROCOGNITIVE DISORDER DUE TO ALZHEIMER'S DISEASE, POSSIBLE: ICD-10-CM

## 2020-10-13 LAB
ALBUMIN UR-MCNC: NEGATIVE MG/DL
APPEARANCE UR: CLEAR
BACTERIA #/AREA URNS HPF: ABNORMAL HPF
BILIRUB UR QL STRIP: NEGATIVE
COLOR UR AUTO: YELLOW
GLUCOSE UR STRIP-MCNC: NEGATIVE MG/DL
HGB UR QL STRIP: NEGATIVE
KETONES UR STRIP-MCNC: NEGATIVE MG/DL
LDLC SERPL CALC-MCNC: 182 MG/DL
LEUKOCYTE ESTERASE UR QL STRIP: ABNORMAL
NITRATE UR QL: NEGATIVE
PH UR STRIP: 6.5 [PH] (ref 5–8)
RBC #/AREA URNS AUTO: ABNORMAL HPF
SP GR UR STRIP: 1.01 (ref 1–1.03)
SQUAMOUS #/AREA URNS AUTO: ABNORMAL LPF
UROBILINOGEN UR STRIP-ACNC: ABNORMAL
VIT B12 SERPL-MCNC: 502 PG/ML (ref 213–816)
WBC #/AREA URNS AUTO: ABNORMAL HPF

## 2020-10-14 ENCOUNTER — COMMUNICATION - HEALTHEAST (OUTPATIENT)
Dept: CARE COORDINATION | Facility: CLINIC | Age: 70
End: 2020-10-14

## 2020-10-14 ENCOUNTER — AMBULATORY - HEALTHEAST (OUTPATIENT)
Dept: FAMILY MEDICINE | Facility: CLINIC | Age: 70
End: 2020-10-14

## 2020-10-14 DIAGNOSIS — E78.00 PURE HYPERCHOLESTEROLEMIA: ICD-10-CM

## 2020-10-14 LAB — BACTERIA SPEC CULT: NO GROWTH

## 2020-10-14 ASSESSMENT — ACTIVITIES OF DAILY LIVING (ADL)
DEPENDENT_IADLS:: CLEANING;COOKING;LAUNDRY;SHOPPING;MEAL PREPARATION;MEDICATION MANAGEMENT;MONEY MANAGEMENT;TRANSPORTATION

## 2020-10-15 ENCOUNTER — COMMUNICATION - HEALTHEAST (OUTPATIENT)
Dept: FAMILY MEDICINE | Facility: CLINIC | Age: 70
End: 2020-10-15

## 2020-10-15 DIAGNOSIS — E11.9 DIABETES MELLITUS TYPE 2, CONTROLLED (H): ICD-10-CM

## 2020-10-16 ENCOUNTER — COMMUNICATION - HEALTHEAST (OUTPATIENT)
Dept: FAMILY MEDICINE | Facility: CLINIC | Age: 70
End: 2020-10-16

## 2020-10-19 ENCOUNTER — COMMUNICATION - HEALTHEAST (OUTPATIENT)
Dept: FAMILY MEDICINE | Facility: CLINIC | Age: 70
End: 2020-10-19

## 2020-10-19 DIAGNOSIS — E11.9 TYPE 2 DIABETES MELLITUS WITHOUT COMPLICATION, WITHOUT LONG-TERM CURRENT USE OF INSULIN (H): ICD-10-CM

## 2020-10-19 RX ORDER — GLUCOSAMINE HCL/CHONDROITIN SU 500-400 MG
CAPSULE ORAL
Qty: 100 STRIP | Refills: 11 | Status: SHIPPED | OUTPATIENT
Start: 2020-10-19 | End: 2021-07-09

## 2020-10-28 ENCOUNTER — COMMUNICATION - HEALTHEAST (OUTPATIENT)
Dept: NURSING | Facility: CLINIC | Age: 70
End: 2020-10-28

## 2020-11-04 ENCOUNTER — COMMUNICATION - HEALTHEAST (OUTPATIENT)
Dept: NURSING | Facility: CLINIC | Age: 70
End: 2020-11-04

## 2020-11-13 ENCOUNTER — COMMUNICATION - HEALTHEAST (OUTPATIENT)
Dept: NURSING | Facility: CLINIC | Age: 70
End: 2020-11-13

## 2020-11-17 ENCOUNTER — COMMUNICATION - HEALTHEAST (OUTPATIENT)
Dept: CARE COORDINATION | Facility: CLINIC | Age: 70
End: 2020-11-17

## 2020-11-30 ENCOUNTER — COMMUNICATION - HEALTHEAST (OUTPATIENT)
Dept: NURSING | Facility: CLINIC | Age: 70
End: 2020-11-30

## 2020-12-09 ENCOUNTER — COMMUNICATION - HEALTHEAST (OUTPATIENT)
Dept: CARE COORDINATION | Facility: CLINIC | Age: 70
End: 2020-12-09

## 2021-01-05 ENCOUNTER — COMMUNICATION - HEALTHEAST (OUTPATIENT)
Dept: FAMILY MEDICINE | Facility: CLINIC | Age: 71
End: 2021-01-05

## 2021-01-05 DIAGNOSIS — E78.00 PURE HYPERCHOLESTEROLEMIA: ICD-10-CM

## 2021-01-05 RX ORDER — ATORVASTATIN CALCIUM 20 MG/1
TABLET, FILM COATED ORAL
Qty: 90 TABLET | Refills: 1 | Status: SHIPPED | OUTPATIENT
Start: 2021-01-05

## 2021-01-12 ENCOUNTER — COMMUNICATION - HEALTHEAST (OUTPATIENT)
Dept: INTERNAL MEDICINE | Facility: CLINIC | Age: 71
End: 2021-01-12

## 2021-01-12 DIAGNOSIS — E11.9 DIABETES MELLITUS TYPE 2, CONTROLLED (H): ICD-10-CM

## 2021-01-13 RX ORDER — METFORMIN HCL 500 MG
500 TABLET, EXTENDED RELEASE 24 HR ORAL
Qty: 90 TABLET | Refills: 0 | Status: SHIPPED | OUTPATIENT
Start: 2021-01-13 | End: 2021-11-19

## 2021-03-17 ENCOUNTER — COMMUNICATION - HEALTHEAST (OUTPATIENT)
Dept: CARE COORDINATION | Facility: CLINIC | Age: 71
End: 2021-03-17

## 2021-04-14 ENCOUNTER — OFFICE VISIT - HEALTHEAST (OUTPATIENT)
Dept: FAMILY MEDICINE | Facility: CLINIC | Age: 71
End: 2021-04-14

## 2021-04-14 DIAGNOSIS — I10 ESSENTIAL HYPERTENSION: ICD-10-CM

## 2021-04-14 DIAGNOSIS — E03.9 HYPOTHYROIDISM, UNSPECIFIED TYPE: ICD-10-CM

## 2021-04-14 DIAGNOSIS — F03.90 MAJOR NEUROCOGNITIVE DISORDER DUE TO ALZHEIMER'S DISEASE, POSSIBLE: ICD-10-CM

## 2021-04-14 DIAGNOSIS — E11.9 TYPE 2 DIABETES MELLITUS WITHOUT COMPLICATION, WITHOUT LONG-TERM CURRENT USE OF INSULIN (H): ICD-10-CM

## 2021-04-14 DIAGNOSIS — E78.00 HYPERCHOLESTEROLEMIA: ICD-10-CM

## 2021-04-14 LAB
HBA1C MFR BLD: 5.6 %
LDLC SERPL CALC-MCNC: 72 MG/DL

## 2021-04-15 ENCOUNTER — COMMUNICATION - HEALTHEAST (OUTPATIENT)
Dept: NURSING | Facility: CLINIC | Age: 71
End: 2021-04-15

## 2021-04-19 ENCOUNTER — COMMUNICATION - HEALTHEAST (OUTPATIENT)
Dept: NURSING | Facility: CLINIC | Age: 71
End: 2021-04-19

## 2021-05-12 ENCOUNTER — COMMUNICATION - HEALTHEAST (OUTPATIENT)
Dept: INTERNAL MEDICINE | Facility: CLINIC | Age: 71
End: 2021-05-12

## 2021-05-12 DIAGNOSIS — R41.3 MEMORY LOSS: ICD-10-CM

## 2021-05-13 RX ORDER — DONEPEZIL HYDROCHLORIDE 5 MG/1
TABLET, FILM COATED ORAL
Qty: 90 TABLET | Refills: 3 | Status: SHIPPED | OUTPATIENT
Start: 2021-05-13 | End: 2021-06-22

## 2021-05-28 NOTE — TELEPHONE ENCOUNTER
Refill Approved    Rx renewed per Medication Renewal Policy. Medication was last renewed on 8/18/18.    Leida Ashby, [facility] Triage/Med Refill 5/1/2019     Requested Prescriptions   Pending Prescriptions Disp Refills     levothyroxine (SYNTHROID, LEVOTHROID) 50 MCG tablet [Pharmacy Med Name: LEVOTHYROXINE 50 MCG TABLET] 90 tablet 0     Sig: TAKE 1 TABLET (50 MCG TOTAL) BY MOUTH DAILY AT 6:00 AM.       Thyroid Hormones Protocol Passed - 5/1/2019  1:27 AM        Passed - Provider visit in past 12 months or next 3 months     Last office visit with prescriber/PCP: 10/30/2018 Satya De La Cruz MD OR same dept: 2/19/2019 Juan Francisco Kaiser CNP OR same specialty: 2/19/2019 Juan Francisco Kaiser CNP  Last physical: 2/21/2018 Last MTM visit: Visit date not found   Next visit within 3 mo: Visit date not found  Next physical within 3 mo: Visit date not found  Prescriber OR PCP: Satya De La Cruz MD  Last diagnosis associated with med order: 1. Hypothyroidism, unspecified type  - levothyroxine (SYNTHROID, LEVOTHROID) 50 MCG tablet [Pharmacy Med Name: LEVOTHYROXINE 50 MCG TABLET]; Take 1 tablet (50 mcg total) by mouth Daily at 6:00 am.  Dispense: 90 tablet; Refill: 0    If protocol passes may refill for 12 months if within 3 months of last provider visit (or a total of 15 months).             Passed - TSH on file in past 12 months for patient age 12 & older     TSH   Date Value Ref Range Status   10/30/2018 3.35 0.30 - 5.00 uIU/mL Final

## 2021-05-30 ENCOUNTER — RECORDS - HEALTHEAST (OUTPATIENT)
Dept: ADMINISTRATIVE | Facility: CLINIC | Age: 71
End: 2021-05-30

## 2021-05-30 VITALS — BODY MASS INDEX: 21.49 KG/M2 | WEIGHT: 120.38 LBS

## 2021-05-31 VITALS — WEIGHT: 117 LBS | HEIGHT: 63 IN | BODY MASS INDEX: 20.73 KG/M2

## 2021-05-31 VITALS — HEIGHT: 63 IN | WEIGHT: 115 LBS | BODY MASS INDEX: 20.38 KG/M2

## 2021-05-31 VITALS — BODY MASS INDEX: 20.2 KG/M2 | WEIGHT: 114 LBS | HEIGHT: 63 IN

## 2021-05-31 VITALS — BODY MASS INDEX: 20.09 KG/M2 | WEIGHT: 112.5 LBS

## 2021-05-31 VITALS — HEIGHT: 63 IN | WEIGHT: 116 LBS | BODY MASS INDEX: 20.55 KG/M2

## 2021-05-31 NOTE — TELEPHONE ENCOUNTER
Patient notified of clinician's message and verbalized understanding. No further questions at this time. Report mailed to patient.  Lily Blanco CMA ............... 9:35 AM, 08/30/19

## 2021-05-31 NOTE — TELEPHONE ENCOUNTER
I received a call from [facility] about MRI of the brain.    There was a questionable thin enhancing area in the dura, and radiology could not rule out a thin chronic subdural hemorrhage.  No mass-effect.  There was no mass.  No new stroke.    Radiology recommended a noncontrast head CT scan to further clarify whether this may be a small subdural hemorrhage.    Have patient contacted to set up a head CT scan, preferably tomorrow.    Contact her daughter, Liza, as patient has had some recent memory issues.

## 2021-05-31 NOTE — PROGRESS NOTES
[facility] clinic Follow Up Note    Courtney Singh   69 y.o. female    Date of Visit: 8/21/2019    Chief Complaint   Patient presents with     Follow-up     Diabetes and routine checkup     Subjective  Courtney is here with her daughter, Liza, for routine diabetes and hypertension follow-up.    Patient did not follow-up last winter as planned, was last seen October 2018.    The daughter now reports the patient has developed some memory loss issues since last winter, proximally over the last 6 months.    No fall or head trauma.  No acute illness or acute event can be identified.    Patient's mother had Alzheimer's.    No history of alcohol or sleep apnea.  Patient has been eating normally.    No history of stroke.  She does complain of nonspecific mild left parietal headache.  She states she has had it for some time but cannot be specific on how long.  It is not severe.  No nausea or vomiting.  No vision changes.    She does have chronic sinusitis and uses Singulair, Anali pot and Flonase.  Chronic postnasal drip with chronic pharyngitis.    Also on omeprazole for chronic reflux.  She denies heartburn or abdominal pain.    She does have a family history of B12 deficiency.  She has not been on a B12 supplement.    Diabetes type 2 has been well controlled with metformin  mg a day.  She occasionally checks her blood sugar.  She checked her blood sugar this morning but forgot what it was, she thinks it was 140.    October 2018 hemoglobin A1c was 6%.  Normal kidney and liver tests at that time.  June 2018 urine micro albumin level normal.    No foot neuropathy or foot sores.    I did review the ophthalmology note from last month, no retinopathy in 1 year follow-up given.    Hypertension has been well controlled.  No orthostasis.  Previous kidney labs are normal last October.    On lisinopril 5 mg a day.  Denies orthostasis.  She has not checked her blood pressure on her own.    No right upper quadrant pain  or new myalgias on Lipitor 10 mg a day.    I reviewed labs from October 2017 with an LDL of 85 and HDL 86.    She does not take an aspirin a day.    Bowels are regular no blood in stool.  No abdominal pain complaints.    Hypothyroidism on Synthroid, she denies any missed doses.  Normal TSH last October.    July 2018 mammogram reviewed today, negative.    I reviewed October 2017 DEXA scan with a spine score of -1.1.  Has not been on treatment.    She has not had any dangerous situations with cooking, no concerns for driving.  The daughter simply noticed that she is been more forgetful recently.    She has not been more withdrawn or depressed.  She denies depression and no history of depression.    PMHx:  No past medical history on file.  PSHx:  No past surgical history on file.  Immunizations:   Immunization History   Administered Date(s) Administered     Influenza high dose, seasonal 09/17/2015, 10/03/2016, 10/05/2017, 10/30/2018     Influenza, inj, historic,unspecified 09/01/2012     Influenza, seasonal,quad inj 6-35 mos 10/18/2014     Influenza,seasonal quad, PF 10/16/2013     Pneumo Conj 13-V (2010&after) 08/03/2015     Pneumo Polysac 23-V 04/27/2012     Tdap 11/01/2013     ZOSTER, LIVE 04/12/2012       ROS A comprehensive review of systems was performed and was otherwise negative    Medications, allergies, and problem list were reviewed and updated    Exam  /66 (Patient Site: Right Arm, Patient Position: Sitting, Cuff Size: Adult Regular)   Pulse 68   Wt 116 lb (52.6 kg)   BMI 20.88 kg/m    She is oriented to place person and time.  She did correctly identify date.  Her focus appears good, with no tangential thinking.  Follows commands appropriately.  Answers questions appropriately.  Nonfocal neurologic exam.  Mobility within normal limits.    Pupils and irises equal and reactive.  Extraocular muscles intact.  No jaundice or conjunctivitis.  Pharynx does not appear crowded.  She has moderate diffuse  cobblestoning with chronic pharyngitis.  Teeth in good condition.  No cervical or supraclavicular adenopathy.  No thyromegaly or nodularity.  No JVD and no carotid bruits.  Lungs are clear to auscultation with good respiratory excursion.  Heart is regular with no murmur rub or gallop.  Abdomen is nontender nonobese no hepatosplenomegaly or pulsatile mass.  No ankle edema.  Skin appears normal.    Assessment/Plan  1. Type 2 diabetes mellitus without complication, without long-term current use of insulin (H)  Appears well controlled.  Continue metformin daily.    Yearly ophthalmology in July  - Glycosylated Hemoglobin A1c    2. Essential hypertension  Well-controlled.  Continue lisinopril 5 mg at    3. Pure hypercholesterolemia  Well-controlled on atorvastatin in 2017    Not on aspirin    4. Hypothyroidism, unspecified type  Synthroid  - Thyroid Stimulating Hormone (TSH)    5. Medication monitoring encounter    - Comprehensive Metabolic Panel    6. Memory loss, new issue requiring further evaluation  I suspect development of Alzheimer's dementia.    If confirmed on neuropsychiatric testing, refer to the [facility].    Daughter is watching out for patient.  No danger concerns at this time.    Appears compliant with medication, living independently.  - MR Brain Without Contrast; Future  - Vitamin B12  - Neuropsychological assessment; Future    7. Nonintractable headache, unspecified chronicity pattern, unspecified headache type  Likely tension type headache, but given new memory loss will image brain  - MR Brain Without Contrast; Future    8. Visit for screening mammogram  Patient did want to do a mammogram this year.  - Mammo Screening Bilateral; Future    Remain active with regular walking and yoga.    Consider DEXA scan repeat next year    Chronic reflux laryngitis, on omeprazole.  Continue singular [place] pot and Flonase for chronic sinusitis.    History also obtained from daughter.    Return in about 2  months (around 10/21/2019) for Recheck.   Patient Instructions   Continue current medications.    See  to set up neuropsychiatric testing for memory.    Set up an MRI of your brain.    Results of lab work and MRI will be mailed to you.    See me in 2 months to follow-up on the memory issue.    Schedule your mammogram        Satya De La Cruz MD      Current Outpatient Medications   Medication Sig Dispense Refill     ACCU-CHEK SMARTVIEW TEST STRIP strips TEST ONCE DAILY. 100 strip 3     atorvastatin (LIPITOR) 10 MG tablet Take 1 tablet (10 mg total) by mouth at bedtime. 90 tablet 2     famotidine (PEPCID) 20 MG tablet Take 1 tablet (20 mg total) by mouth 2 (two) times a day. 180 tablet 3     fluticasone (FLONASE) 50 mcg/actuation nasal spray 2 sprays into each nostril daily. 48 g 3     lancets (ULTRA THIN LANCETS) 30 gauge Misc Test sugars 1-2 time per week 100 each 3     levothyroxine (SYNTHROID, LEVOTHROID) 50 MCG tablet TAKE 1 TABLET (50 MCG TOTAL) BY MOUTH DAILY AT 6:00 AM. 90 tablet 1     lisinopril (PRINIVIL,ZESTRIL) 5 MG tablet TAKE 1 TABLET BY MOUTH DAILY. 90 tablet 3     metFORMIN (GLUCOPHAGE-XR) 500 MG 24 hr tablet Take 1 tablet (500 mg total) by mouth daily with breakfast. 90 tablet 3     montelukast (SINGULAIR) 10 mg tablet TAKE 1 TABLET BY MOUTH ONE TIME DAILY AT BEDTIME (Patient not taking: Reported on 8/21/2019      ) 90 tablet 3     omeprazole (PRILOSEC) 40 MG capsule TAKE 1 CAP ONCE DAILY, 30-60 MIN. BEFORE LARGEST MEAL OF DAY, IF SYMPTOMS IMPROVE,TAKE FOR A FULL MO (Patient not taking: Reported on 8/21/2019) 90 capsule 3     No current facility-administered medications for this visit.      Allergies   Allergen Reactions     Azithromycin      Clindamycin      Levofloxacin      Ears buzzing     Sulfa (Sulfonamide Antibiotics)      Social History     Tobacco Use     Smoking status: Never Smoker     Smokeless tobacco: Never Used   Substance Use Topics     Alcohol use: Not on file     Drug use: Not  on file

## 2021-05-31 NOTE — TELEPHONE ENCOUNTER
8/29 @ 4:23 pm - Just spoke to [facility], they were going to contact pt's , Tao to schedule appt, could not reach daughter, Liza.

## 2021-05-31 NOTE — TELEPHONE ENCOUNTER
I did receive the report from the head CT scan.  There was no intracranial hemorrhage.  No change in treatment plan.    Tell patient had CT scan was okay, no bleed.  No new orders or change in treatment plan.    Mail patient a copy of the head CT scan report.  Copy is in my out basket.

## 2021-05-31 NOTE — TELEPHONE ENCOUNTER
Left a message to call back on the daughter's phone which had gone straight to voicemail. When she returns the call, please inform her of the below message.      Lucina Padgett, [facility]  3:08 PM  8/29/2019

## 2021-05-31 NOTE — TELEPHONE ENCOUNTER
Patient contacted and informed of the below message from PCP regarding brain MRI results and recommended non-contrast head CT scan. Patient expressed understanding and agreed to being contacted to schedule this to be done as soon as tomorrow. Please help to schedule. Thank you!      Lucina Padgett CMA  4:10 PM  8/29/2019

## 2021-05-31 NOTE — PATIENT INSTRUCTIONS - HE
Continue current medications.    See  to set up neuropsychiatric testing for memory.    Set up an MRI of your brain.    Results of lab work and MRI will be mailed to you.    See me in 2 months to follow-up on the memory issue.    Schedule your mammogram

## 2021-05-31 NOTE — TELEPHONE ENCOUNTER
Medication refill requested denied: requested refill too early     Disp Refills Start End     levothyroxine (SYNTHROID, LEVOTHROID) 50 MCG tablet 90 tablet 1 5/1/2019     Sig - Route: TAKE 1 TABLET (50 MCG TOTAL) BY MOUTH DAILY AT 6:00 AM. - Oral    Sent to pharmacy as: levothyroxine (SYNTHROID, LEVOTHROID) 50 MCG tablet    E-Prescribing Status: Receipt confirmed by pharmacy (5/1/2019  5:38 PM CDT)      Adalgisa Leone RN [facility] Triage/Med Refill 8/24/2019 8:39 PM

## 2021-05-31 NOTE — TELEPHONE ENCOUNTER
Refill Approved    Rx renewed per Medication Renewal Policy. Medication was last renewed on 7/27/18.    Rosita Fan, [facility] Triage/Med Refill 8/25/2019     Requested Prescriptions   Pending Prescriptions Disp Refills     lisinopril (PRINIVIL,ZESTRIL) 5 MG tablet [Pharmacy Med Name: LISINOPRIL 5 MG TABLET] 90 tablet 3     Sig: TAKE 1 TABLET BY MOUTH EVERY DAY       Ace Inhibitors Refill Protocol Passed - 8/24/2019 12:11 PM        Passed - PCP or prescribing provider visit in past 12 months       Last office visit with prescriber/PCP: 8/21/2019 Satya De La Cruz MD OR same dept: 8/21/2019 Satya De La Cruz MD OR same specialty: 8/21/2019 Satya De La Cruz MD  Last physical: 2/21/2018 Last MTM visit: Visit date not found   Next visit within 3 mo: Visit date not found  Next physical within 3 mo: Visit date not found  Prescriber OR PCP: Satya De La Cruz MD  Last diagnosis associated with med order: 1. Essential hypertension  - lisinopril (PRINIVIL,ZESTRIL) 5 MG tablet [Pharmacy Med Name: LISINOPRIL 5 MG TABLET]; TAKE 1 TABLET BY MOUTH EVERY DAY  Dispense: 90 tablet; Refill: 3    If protocol passes may refill for 12 months if within 3 months of last provider visit (or a total of 15 months).             Passed - Serum Potassium in past 12 months     Lab Results   Component Value Date    Potassium 4.4 08/21/2019             Passed - Blood pressure filed in past 12 months     BP Readings from Last 1 Encounters:   08/21/19 136/66             Passed - Serum Creatinine in past 12 months     Creatinine   Date Value Ref Range Status   08/21/2019 0.85 0.60 - 1.10 mg/dL Final

## 2021-06-01 VITALS — BODY MASS INDEX: 20.38 KG/M2 | WEIGHT: 115 LBS | HEIGHT: 63 IN

## 2021-06-01 VITALS — HEIGHT: 63 IN | WEIGHT: 116 LBS | BODY MASS INDEX: 20.55 KG/M2

## 2021-06-01 NOTE — TELEPHONE ENCOUNTER
Medication Request  Medication name: Accu-Chek Doris Plus Meter  Pharmacy Name and Location: [facility]  Reason for request: Pharmacy Comments:  Patient is requesting a new Rx.  Patient's meter has stopped working.   When did you use medication last?:  Information not provided.  Patient offered appointment:  N/a  Okay to leave a detailed message: no

## 2021-06-02 ENCOUNTER — COMMUNICATION - HEALTHEAST (OUTPATIENT)
Dept: INTERNAL MEDICINE | Facility: CLINIC | Age: 71
End: 2021-06-02

## 2021-06-02 VITALS — WEIGHT: 119.9 LBS | BODY MASS INDEX: 21.58 KG/M2

## 2021-06-02 VITALS — BODY MASS INDEX: 21.29 KG/M2 | WEIGHT: 118.3 LBS

## 2021-06-02 DIAGNOSIS — K21.9 GASTROESOPHAGEAL REFLUX DISEASE: ICD-10-CM

## 2021-06-02 NOTE — PROGRESS NOTES
The patient was seen for a neuropsychological evaluation for the purposes of diagnostic clarification and treatment planning. 109 minutes of face-to-face testing were provided by this writer. An additional 45 minutes were spent scoring and compiling test results.The patient was cooperative with testing. No concerns were brought to my attention. Please see Dr. De's report for a detailed description of the charges and interpretation and integration of the findings.

## 2021-06-02 NOTE — PROGRESS NOTES
NEUROPSYCHOLOGICAL CONSULTATION    NAME:  Courtney Singh  :  1950    ROSS: 10/9/2019    REASON FOR REFERRAL:  Ms. Singh is a 69 y.o., right-handed,  female with well-controlled type II diabetes mellitus, hypertension, hypercholesterolemia, hypothyroidism, GERD, and chronic sinusitis. Concerns were raised about the patient's progressive memory loss during a recent follow-up appointment with her PCP, Satya De La Cruz MD. She was subsequently referred for this neuropsychological evaluation by Dr. De La Cruz to assist with differential diagnosis and care planning.     Verbal consent for neuropsychological testing was received following the provision of information about the nature and purpose of the evaluation, and the opportunity to ask questions.    HISTORY OF PRESENTING PROBLEM:  The following information was obtained via medical record review and by interview of the patient and her daughter, Liza.    With regard to cognitive functioning, Ms. Singh reported experiencing recent difficulty with her memory and word-finding abilities. She specifically noted that she loses things around the home frequently. Her daughter, Liza, also added that her mother forgets conversations just a few minutes after having them. She has also reportedly gotten lost while driving to familiar places (e.g., Liza's home and her favorite nearby restaurant). The patient denied any issues with attention/concentration or other aspects of language processing (i.e., comprehension, reading, or writing). Liza stated that she has noticed these issues with her mother for several years, but they became noticeably worse in the past couple of years with more acute worsening in the last 6 months.     With regard to the activities of daily living, Ms. Singh reported that she is mostly independent but has been struggling in a couple of areas as of late. She currently resides in a single-family home with her . She continues to drive but  "largely remains within a 5-mile radius from her home. Nevertheless, as stated above, she recently got lost while driving to her daughter's house and while going to [employer], even though she has been driving there multiple times per week for several years. With regard to medication management, Liza reported that her mother has gotten confused about what medications she should take. The patient also acknowledged forgetting to pay her bills a couple of times. She denied any problems with cooking/meal preparation but also stated that she does not cook much anymore. She reported that she independently performs household chores and errands without issues. Liza corroborated these reports.    MEDICAL HISTORY:  Ms. Singh's medical history is significant for well-controlled type II diabetes mellitus, hypertension, hypercholesterolemia, hypothyroidism, GERD, and chronic sinusitis. She endorsed a remote history of \"passing out\" a few times, but this has not re-occurred in recent years. She noted that her sense of taste has diminished in some respects, but denied any anosmia. Liza reported that her mother recently smelled a strong, foul odor that no one else could smell; however, this was the only instance in which this occurred. The patient denied any known history of significant head injury, seizure, stroke, heart attack, tremor, recent falls, balance issues, or visual hallucinations.     Diagnostic studies:  Head CT dated 8/29/2019 revealed mild diffuse parenchymal volume loss and mild chronic microvascular ischemic white matter changes. The patient previously underwent a brain MRI on 8/28/2019; however, I do not have access to the results.    Current medications include (per medical record):   Current Outpatient Medications:      ACCU-CHEK SMARTVIEW TEST STRIP strips, TEST ONCE DAILY., Disp: 100 strip, Rfl: 3     atorvastatin (LIPITOR) 10 MG tablet, Take 1 tablet (10 mg total) by mouth at bedtime., Disp: 90 tablet, Rfl: 2     " blood glucose meter (GLUCOMETER), Test one time daily. DX: E11.9 Dispense brand per patient's insurance at pharmacy discretion., Disp: 1 each, Rfl: 0     blood glucose test strips, Test one time daily. DX: E11.9 Dispense brand per patient's insurance at pharmacy discretion., Disp: 100 strip, Rfl: 11     famotidine (PEPCID) 20 MG tablet, Take 1 tablet (20 mg total) by mouth 2 (two) times a day., Disp: 180 tablet, Rfl: 3     fluticasone (FLONASE) 50 mcg/actuation nasal spray, 2 sprays into each nostril daily., Disp: 48 g, Rfl: 3     generic lancets (FINGERSTIX LANCETS), Test one time daily. DX: E11.9 Dispense brand per patient's insurance at pharmacy discretion., Disp: 100 each, Rfl: 11     lancets (ULTRA THIN LANCETS) 30 gauge Misc, Test sugars 1-2 time per week, Disp: 100 each, Rfl: 3     levothyroxine (SYNTHROID, LEVOTHROID) 50 MCG tablet, TAKE 1 TABLET (50 MCG TOTAL) BY MOUTH DAILY AT 6:00 AM., Disp: 90 tablet, Rfl: 1     lisinopril (PRINIVIL,ZESTRIL) 5 MG tablet, Take 1 tablet (5 mg total) by mouth daily., Disp: 90 tablet, Rfl: 3     metFORMIN (GLUCOPHAGE-XR) 500 MG 24 hr tablet, Take 1 tablet (500 mg total) by mouth daily with breakfast., Disp: 90 tablet, Rfl: 3     montelukast (SINGULAIR) 10 mg tablet, TAKE 1 TABLET BY MOUTH ONE TIME DAILY AT BEDTIME (Patient not taking: Reported on 8/21/2019   ), Disp: 90 tablet, Rfl: 3     omeprazole (PRILOSEC) 40 MG capsule, TAKE 1 CAP ONCE DAILY, 30-60 MIN. BEFORE LARGEST MEAL OF DAY, IF SYMPTOMS IMPROVE,TAKE FOR A FULL MO (Patient not taking: Reported on 8/21/2019), Disp: 90 capsule, Rfl: 3.    FAMILY MEDICAL HISTORY:   Of note, the patient has no knowledge of her paternal family medical history, as she never knew her father. Known family medical history is significant for dementia in her mother (suspected Alzheimer's disease). Family neurologic history is otherwise denied. The patient's daughter and youngest brother are both left-handed.    PSYCHIATRIC HISTORY:  With  "regard to her psychiatric history, Ms. Singh denied a history of past psychiatric conditions or mental health treatment. Currently, the patient described her mood as \"pretty good\" and denied significant depression or anxiety symptoms. Liza stated that her mother has always been a worrier, but she denied any recent changes or concerns about her mother's mood or personality. The patient reported that her appetite is normal. Her sleep is normal and largely undisturbed. She estimates that she is typically getting 9.5-11 hours of sleep per night and reported that she feels well rested in the morning. She noted that she feels adequately energized during the day. Symptoms of REM sleep behavior disorder were denied. She reportedly does not snore.    With regard to substance abuse, Ms. Singh denied history of past drug or alcohol abuse or dependence. She was never a smoker. Current substance use was also denied.     SOCIAL HISTORY:  Ms. Singh was born and raised in [place], [place]. Birth or developmental issues were denied. She graduated high school and earned mostly average grades. Significant learning difficulties or developmental attention issues were denied. 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 She has been  for 51 years, and they have 3 children together. The patient and her  live together in their own home in [place], [place]. For leisure, the patient socializes with her friend 3 days per week, goes for a walk every day, and spends time with her family.    SERVICES:   A clinical interview/neurobehavioral status examination was conducted with the patient and her daughter and was documented. I thoroughly reviewed the medical record, selected the neuropsychological test battery, provided supervision to the trained examiner/technician, interpreted/integrated patient data and test results, and engaged in clinical decision making, treatment " planning and report writing/preparation. I directly administered/scored 2+ of the neuropsychological tests. A trained examiner/technician administered and scored the remainder of the neuropsychological tests (2+ tests).  Please see below for a breakdown of time spent and the associated codes billed for these services.   Services   Time Spent  CPT Codes   Neurobehavioral Status Exam:  (e.g., face-to-face, interpretation, report)   85 minutes   1 x 96116   Neuropsychological Evaluation Services:   (e.g., integration, interpretation, treatment planning, clinical decision making, feedback)   166 minutes   1 x 96132  2 x 96133   Neuropsychological Testing by Psychologist:  (e.g., test administration, scoring, 2+ tests administered)   18 minutes   1 x 96136     Neuropsychological Testing by Trained Examiner/Technician:  (e.g., test administration, scoring, 2+ tests administered)   154 minutes   1 x 96138  4 x 96139     For diagnostic and coding purposes, Ms. Singh has a history of well-controlled type II diabetes mellitus, hypertension, hypercholesterolemia, hypothyroidism, GERD, and chronic sinusitis. She was referred for an evaluation of major neurocognitive disorder.     TESTS ADMINISTERED:   Wechsler Memory Scale-III Information/Orientation, Wechsler Adult Intelligence Scale-IV (select subtests), Wide Range Achievement Test-4 (select subtests), Wechsler Memory Test-IV (select subtests), Brief Visuospatial Memory Test-Revised, [place] Verbal Learning Test-Short Form, Trailmaking Test, Controlled Oral Word Association Test and Category Fluency, [place] Naming Test-Short Form, Clock Drawing Test, Saba Vazquez Executive Functioning Test (Color Word Interference subtest), Generalized Anxiety Disorder-7 Assessment and Geriatric Depression Scale-Short Form.    BEHAVIORAL OBSERVATIONS:   Ms. Singh arrived on time and accompanied by her daughter to today's appointment. She was appropriately dressed and groomed. She appeared  alert and engaged. Gait and other motor activity appeared normal. No vision or hearing difficulties were observed. Conversational speech was of normal rate, volume, and prosody. Occasional word-finding pauses were noted. Her thought process appeared linear and goal-directed. No hallucinations or delusions were apparent. Judgment and insight appeared somewhat limited. Her mood was mostly euthymic and her affect was appropriately reactive. She did become tearful when asked about her memory issues. Rapport was easily established and eye contact was appropriate.     During testing, she was alert throughout. She was pleasant and cooperative throughout the evaluation, although she became frustrated with challenging tasks and required encouragement and reassurance. She understood test instructions without difficulty but occasionally required reminders about task demands partway through a task. Response latencies were slower than average. No frontal signs were observed behaviorally. Ms. Singh appeared adequately motivated and engaged easily during testing. Her performances were fully intact on embedded and/or stand-alone measures of objective effort. Therefore, the following results are considered a valid estimation of her current cognitive abilities.    OPTIMAL PREMORBID INTELLECT:  Optimal premorbid intellectual abilities were estimated as falling in the average range based on Ms. Singh's educational and occupational histories and performance on tasks least likely to be affected by acquired brain dysfunction.    SUMMARY OF TEST RESULTS:  ATTENTION/WORKING MEMORY. Performance on a measure sensitive to sustained auditory-verbal attention and concentration (WAIS-IV Digit Span) was in the average range, as she was able to recite up to 6 digits forward (average), up to 4 digits backward (average), and up to 4 digits in sequence (average). On a test of complex concentration that requires speeded numeric sequencing (Trails A),  "the patient performed in the average range and without error. On a more difficult version of that task that requires speeded alpha-numeric sequencing/cognitive set-shifting (Trails B), the patient had significant difficulty completing the practice trial, requiring numerous prompts and assistance. As such, the task was ultimately discontinued.      PROCESSING SPEED. Speeded digit-symbol coding was in the low average range (WAIS-IV Coding). On the DKEFS Color-Word Interference Test, speeded color naming was average, while speeded word reading was high average.     LANGUAGE PROCESSING. Language comprehension appeared intact. Performance on a subtest of word knowledge was in the low average range (WAIS-IV Vocabulary). The patient demonstrated severely impaired performance on the [place] Naming Test, a test of visual confrontation naming and semantic retrieval. She did not benefit much from phonemic cues on that task (+4/11). Phonemic fluency was in the average range (COWAT), with 3 repetition errors and 3 set-loss errors. In contrast, her semantic fluency was severely impaired (Category Fluency) with 8 repetition errors and 6 set-loss errors.  Her writing sample included an incomplete sentence but was otherwise unremarkable (\"Beautiful day today\"). There was no evidence of micrographia.     VISUOSPATIAL/CONSTRUCTIONAL SKILLS. The WAIS-IV Perceptual Reasoning Index was in the mildly impaired range (pro-rated CATRACHITO = 63), with mildly impaired nonverbal abstract reasoning (Matrix Reasoning), and moderately impaired visuoconstruction with three-dimensional blocks (Block Design). Copy of six simple geometric figures was within normal limits (BVMT-R Copy). On a Clock Drawing Task, the patient was able to draw a large, well-formed [place] with equally spaced and correctly placed numbers. However, she only josy one clock hand and it pointed to the 11.      LEARNING/MEMORY. The patient was adequately oriented to personal and current " "information, although she was unable to recall the previous [place] President's name or the current place. On the WMS-IV Logical Memory subtest, immediate memory for paragraph-length stories was severely impaired . Delayed memory was also severely impaired with a 0% retention rate, even after being provided with cues about both stories. Delayed recognition of that same material was also severely impaired and she exhibited a profound positive response bias (she responded \"yes\" to every yes/no question about the stories). On a 9-item verbal list-learning task (CVLT-II Short Form), the patient acquired up to 5 words (56%) of the word list by the 4th and final learning trial (raw scores over trials = 3, 4, 5, 3). Total learning acquisition was in the mildly impaired range. Following a distractor task, the patient recalled 1 item, which was in the borderline impaired range. After a 10-minute delay, the patient recalled 0 items, which was in the severely impaired range. Her recall benefitted only slightly further from semantic cues (2 items; mildly impaired range); however, she also made 5 intrusion errors (severely impaired). Recognition discriminability was in the severely impaired range, as she again exhibited a strong positive response bias (she correctly identified 9/9 items but also made 9 false-positive errors).    On a visual memory measure (BVMT-R), immediate recall of six simple geometric figures over three learning trials was in the severely impaired range (raw scores over trials = 1, 1, 1 out of 12). Delayed recall of the figures was in the severely impaired range, with a 0% retention rate (raw score = 0 out of 12). Recognition discriminability was in the severely impaired range, as she correctly recognized 6/6 figures but made 5 false-positive errors, again demonstrating a strong positive response bias.     EXECUTIVE FUNCTIONS. On a test of inhibition and cognitive flexibility, (DKEFS Color-Word Interference " Inhibition trial), the patient performed in the mildly impaired range for completion time but made only 3 errors, which is in the average range. On a test of complex concentration that requires speeded numeric sequencing (Trails A), the patient performed in the average range and without error. However, on a more difficult version of that task that requires speeded alpha-numeric sequencing/cognitive set-shifting (Trails B), performance was severely impaired (the task was discontinued after the practice trial as she was having tremendous difficulty). Nonverbal abstract reasoning was mildly impaired (WAIS-IV Matrix Reasoning). Phonemic fluency was average (COWAT). Performance on the Clock Drawing Test was impaired, as she was unable to accurately represent analog time     MOOD. On a self-report measure of depression (GDS - Short Form), the patient endorsed 0 items. This suggests depressive symptoms in the normal range. On the RIP-7, a self-report measure of anxiety, she obtained a score of 0,  placing her in the range of minimal anxiety.    SUMMARY OF FINDINGS:  Ms. Singh is a 69 y.o., right-handed,  female with well-controlled type II diabetes mellitus, hypertension, hypercholesterolemia, hypothyroidism, GERD, and chronic sinusitis. Concerns were raised about the patient's progressive memory loss during a recent follow-up appointment with her PCP, Satya De La Cruz MD. She was subsequently referred for this neuropsychological evaluation by Dr. De La Cruz to assist with differential diagnosis and care planning.     Optimal premorbid intellectual abilities are estimated as falling in the average range, and several of Ms. Singh's performances fall well below that estimate. Specifically, she exhibits severe impairments on all measures of learning/memory, confrontation naming, and semantic fluency. Executive functions (i.e., mental set-shifting and abstract reasoning) and visuospatial/constructional abilities are also  impaired, but to a slightly lesser degree. With regard to learning/memory more specifically, the patient demonstrates a profound encoding deficit, as evidenced by impaired new learning and severely impaired delayed recall (with a 0% retention rate on all measures). Her recall is not aided by semantic cues or recognition testing; in fact, she exhibits a remarkable positive response bias on all recognition trials. There is no material-specific compromise on memory testing, as both her verbal and nonverbal memories are notably and equally impaired. In contrast to these deficits, the patient's performances are relatively intact on measures of attention/concentration, processing speed, and other aspects of language functioning (e.g., word reading, comprehension, vocabulary, and phonemic fluency). With regard to emotional functioning, the patient denies any current symptoms of depression or anxiety on self-report inventories.    Overall, these results are suggestive of moderate cerebral dysfunction in the bilateral mesial temporal structures, the dominant (presumably left) anterior-lateral temporal region, the nondominant parietal lobe, and some aspects of the prefrontal regions bilaterally. 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 The current cognitive profile is inconsistent with a cerebrovascular etiology (and her recent neuroimaging suggests only a mild burden of cerebrovascular disease). She does not have any compelling clinical features of a synucleinopathy or frontotemporal dementia, nor is her profile suggestive of these etiologies. There is no support for medication side effects, there are no significant psychiatric factors present, and her sleep is normal and largely undisturbed. Her recent blood work is  unremarkable as well. Please see below for my recommendations.    DIAGNOSTIC IMPRESSIONS:  Major Neurocognitive Disorder due to Alzheimer's Disease    RECOMMENDATIONS:  1) Referral to neurology would be appropriate for further evaluation, treatment, and monitoring over time. If not medically contraindicated, Ms. Singh may benefit from a trial of a cognitive-enhancing medication (e.g., Aricept).     2) Continued good management of her cerebrovascular risk factors (i.e., diabetes, blood pressure, and cholesterol) is highly encouraged in order to reduce exacerbations in her cognitive decline.     3) Given her cognitive profile characterized by memory impairment and executive dysfunction, it is strongly encouraged that Ms. Singh undergo a formal driving evaluation. Possible options for formal driving evaluations would be: [facility] ([phone]), [facility] ([phone]) or any option recommended by her physician. In the meantime, it is suggested that she restrict her driving to familiar locations, avoid high traffic areas or rapidly changing traffic patterns, and drive only during the day.     4) If not already done, completion of paperwork regarding advance directives and assignment of healthcare and financial Power of  should be considered at this time.    5) Ms. Singh would benefit from increased oversight and assistance in her daily life, particularly with regard to activities such as medication and financial management and making complex decisions. Her  resides with her and her daughter lives close by and can provide help as needed. However, an assisted living facility could be considered (particularly in the future), so as to allow the patient some independence while also providing a structured and supportive environment.      6) It will be increasingly important for Ms. Singh and her family/loved ones to have a strong support network in place. The Alzheimer s Association  (http://www.alz.org/mnnd or 1-650.360.7749) has information about local support groups as well as a breadth of informational materials.     7) Ms. Singh is encouraged to remain physically, socially, and mentally active.     8) Neuropsychological follow-up is not warranted at this time given the severity of her cognitive deficits. However, this evaluation can serve as a baseline should a re-assessment be warranted in the future.      Ms. Singh has requested to receive the results of this evaluation from her referring provider at the time of an upcoming follow-up appointment; however, she was encouraged to schedule a formal feedback appointment with me after that appointment to discuss these results in further detail. Thank you for allowing me to participate in Ms. Singh's care. Please contact me with any questions regarding the content of this report.      Kathleen De PsyD, LP  Licensed Clinical Neuropsychologist  [facility]  Neuropsychology Section   Phone:  [phone]

## 2021-06-02 NOTE — PROGRESS NOTES
[facility] clinic Follow Up Note    Courtney Singh   69 y.o. female    Date of Visit: 10/23/2019    Chief Complaint   Patient presents with     Follow-up     Follow-up on management of memory loss     Subjective  Courtney is here with daughter, Liza, and granddaughter Siena.  Siena's mother is the other daughter.    Patient has had progressive memory loss over the past year.  Patient's mother had Alzheimer's.    Patient had neuropsychiatric testing which I did review with patient and family today.  This was done on October 9, 2019.  It was consistent with Alzheimer's dementia, with significant deficits.    The daughter has noticed continued progressive memory loss.  She is forgetting conversations minutes later.  She is getting lost when driving but has not had any accidents or control over the vehicle issues.  Patient states she just drives where she knows how to go during the day.    She has not had any wandering issues or leaving the stove on or other safety issues reported.    August 2019 brain imaging showed volume loss but no stroke.  No history of stroke.    Patient and family do not report depression.    Patient does have diabetes that is been well controlled on metformin 500 mg a day.  No nausea or GI upset.  Hemoglobin A1c in August was 6.0%.  Normal kidney liver test.    Blood pressures been well controlled and no orthostasis.  On lisinopril 5 mg a day.    Still on Lipitor 10 mg with well-controlled cholesterol in 2017.  Has a high HDL.  Not taking aspirin.    Chronic sinusitis without flare.  Has used Singulair and Anali pot and Flonase previous    Denies heartburn.    She denies any missed doses of her thyroid.  Normal TSH in August.    I did review the mammogram from September that was negative.    Ophthalmology in July was negative for retinopathy.    No neuropathy    August 2019 labs reviewed with normal B12 and TSH    No alcohol history    No history of syncope or heart  block.    PMHx:  No past medical history on file.  PSHx:  No past surgical history on file.  Immunizations:   Immunization History   Administered Date(s) Administered     Influenza high dose,seasonal,PF, 65+ yrs 09/17/2015, 10/03/2016, 10/05/2017, 10/30/2018, 10/16/2019     Influenza, inj, historic,unspecified 09/01/2012     Influenza, seasonal,quad inj 6-35 mos 10/18/2014     Influenza,seasonal quad, PF 10/16/2013     Pneumo Conj 13-V (2010&after) 08/03/2015     Pneumo Polysac 23-V 04/27/2012     Tdap 11/01/2013     ZOSTER, LIVE 04/12/2012       ROS A comprehensive review of systems was performed and was otherwise negative    Medications, allergies, and problem list were reviewed and updated    Exam  There were no vitals taken for this visit.  Weight is 109 pounds, down 7 pounds.  Blood pressure 144/68.  Heart rate 72.    Patient otherwise is bright and alert, still oriented to place and person, but does have some short-term memory deficits with conversation.  Pupils and irises equal and reactive.  Nonfocal neurologic exam.  Able to clamp on the exam table.  No jaundice.  Lungs clear.  Heart is regular without murmur.  Abdomen is nontender, thin.  No edema.    Assessment/Plan  1. Type 2 diabetes mellitus without complication, without long-term current use of insulin (H)  Controlled.  Continue metformin 500 mg a day.    Yearly eye exam in July, negative  - Glycosylated Hemoglobin A1c    2. Essential hypertension  Controlled on lisinopril 5 mg    3. Pure hypercholesterolemia  Continue on Lipitor 10 mg a day at this time.  Increased vascular risk with diabetes and hypertension.    With her dementia diagnosis, I would have her consider possibly stopping the atorvastatin in the future.    4. Hypothyroidism, unspecified type  Stable dose Synthroid, she denies missed doses  - Thyroid Stimulating Hormone (TSH)    5. Medication monitoring encounter    - Comprehensive Metabolic Panel    6. Memory loss  Consistent with  Alzheimer's dementia.    Refer to neurology for further confirmation.    She does wish to initiate treatment with Aricept.  I did warn her on risk of nausea, diarrhea, heart block and syncope.  I also discussed that the efficacy may not be significant.  I did discuss alternative medication including Namenda.    Follow-up in 1 month to review start of Aricept.  If she is not having any nausea or diarrhea or evidence of heart block or syncope, she could increase Aricept further to 10 mg a day in 1 month to improve efficacy.    Family was told that this medication will only likely give a mild improvement in memory for 6 to 12 months, but does not change long-term outcomes.    See patient instructions for further discussion.  Patient was given a healthcare directive form.  Family will help her with driving assessment.      - donepezil (ARICEPT) 5 MG tablet; Take 1 tablet (5 mg total) by mouth at bedtime.  Dispense: 30 tablet; Refill: 4  - Ambulatory referral to Neurology    History of chronic sinusitis, no flare currently.  She did have her flu shot.    Return in about 4 weeks (around 11/20/2019) for Recheck.   Patient Instructions   Discuss a healthcare directive and your CODE STATUS with your daughters.    Designate a medical power of  and financial power of .  You will need legal forms notarized for that.    Make sure you always have somebody around to watch out for you at all times.    I would recommend a driving assessment at [facility], [phone], or [facility] rehab program at [phone].  If family members have concerns about your driving, you will need to stop driving.  There is a [employer] mobility program available for rides if needed.    Starting Aricept/donepezil 5 mg once a day to help with memory.  Generally that is taken at night, but you can take it other times the day with food if needed.  It can cause nausea or diarrhea.  If you do not tolerate the medication, you can simply stop  it.    You have been given a referral to neurology for further evaluation of your memory loss.    You should not drink any alcohol, which will worsen your memory issue.    Follow-up in 1 month to evaluate the donepezil medication.    Follow-up with me in 3 months for routine checkup.    Satya De La Cruz MD        Current Outpatient Medications   Medication Sig Dispense Refill     ACCU-CHEK SMARTVIEW TEST STRIP strips TEST ONCE DAILY. 100 strip 3     atorvastatin (LIPITOR) 10 MG tablet Take 1 tablet (10 mg total) by mouth at bedtime. 90 tablet 2     blood glucose meter (GLUCOMETER) Test one time daily. DX: E11.9 Dispense brand per patient's insurance at pharmacy discretion. 1 each 0     blood glucose test strips Test one time daily. DX: E11.9 Dispense brand per patient's insurance at pharmacy discretion. 100 strip 11     famotidine (PEPCID) 20 MG tablet Take 1 tablet (20 mg total) by mouth 2 (two) times a day. 180 tablet 3     fluticasone (FLONASE) 50 mcg/actuation nasal spray 2 sprays into each nostril daily. 48 g 3     generic lancets (FINGERSTIX LANCETS) Test one time daily. DX: E11.9 Dispense brand per patient's insurance at pharmacy discretion. 100 each 11     lancets (ULTRA THIN LANCETS) 30 gauge Misc Test sugars 1-2 time per week 100 each 3     levothyroxine (SYNTHROID, LEVOTHROID) 50 MCG tablet TAKE 1 TABLET (50 MCG TOTAL) BY MOUTH DAILY AT 6:00 AM. 90 tablet 1     lisinopril (PRINIVIL,ZESTRIL) 5 MG tablet Take 1 tablet (5 mg total) by mouth daily. 90 tablet 3     metFORMIN (GLUCOPHAGE-XR) 500 MG 24 hr tablet Take 1 tablet (500 mg total) by mouth daily with breakfast. 90 tablet 3     montelukast (SINGULAIR) 10 mg tablet TAKE 1 TABLET BY MOUTH ONE TIME DAILY AT BEDTIME (Patient taking differently: Take 10 mg by mouth at bedtime. TAKE 1 TABLET BY MOUTH ONE TIME DAILY AT BEDTIME      ) 90 tablet 3     donepezil (ARICEPT) 5 MG tablet Take 1 tablet (5 mg total) by mouth at bedtime. 30 tablet 4     omeprazole  (PRILOSEC) 40 MG capsule TAKE 1 CAP ONCE DAILY, 30-60 MIN. BEFORE LARGEST MEAL OF DAY, IF SYMPTOMS IMPROVE,TAKE FOR A FULL MO 90 capsule 3     No current facility-administered medications for this visit.      Allergies   Allergen Reactions     Azithromycin      Clindamycin      Levofloxacin      Ears buzzing     Sulfa (Sulfonamide Antibiotics)      Social History     Tobacco Use     Smoking status: Never Smoker     Smokeless tobacco: Never Used   Substance Use Topics     Alcohol use: Not on file     Drug use: Not on file

## 2021-06-02 NOTE — PATIENT INSTRUCTIONS - HE
Discuss a healthcare directive and your CODE STATUS with your daughters.    Designate a medical power of  and financial power of .  You will need legal forms notarized for that.    Make sure you always have somebody around to watch out for you at all times.    I would recommend a driving assessment at [facility], [phone], or [facility] rehab program at [phone].  If family members have concerns about your driving, you will need to stop driving.  There is a [employer] mobility program available for rides if needed.    Starting Aricept/donepezil 5 mg once a day to help with memory.  Generally that is taken at night, but you can take it other times the day with food if needed.  It can cause nausea or diarrhea.  If you do not tolerate the medication, you can simply stop it.    You have been given a referral to neurology for further evaluation of your memory loss.    You should not drink any alcohol, which will worsen your memory issue.    Follow-up in 1 month to evaluate the donepezil medication.    Follow-up with me in 3 months for routine checkup.

## 2021-06-02 NOTE — TELEPHONE ENCOUNTER
Refill Approved    Rx renewed per Medication Renewal Policy. Medication was last renewed on 5/1/19.    Alie Kapoor, [facility] Triage/Med Refill 10/24/2019     Requested Prescriptions   Pending Prescriptions Disp Refills     levothyroxine (SYNTHROID, LEVOTHROID) 50 MCG tablet [Pharmacy Med Name: LEVOTHYROXINE 50 MCG TABLET] 90 tablet 1     Sig: TAKE 1 TABLET (50 MCG TOTAL) BY MOUTH DAILY AT 6:00 AM.       Thyroid Hormones Protocol Passed - 10/24/2019  7:52 PM        Passed - Provider visit in past 12 months or next 3 months     Last office visit with prescriber/PCP: 10/23/2019 Satya De La Cruz MD OR same dept: 10/23/2019 Satya De La Cruz MD OR same specialty: 10/23/2019 Satya De La Cruz MD  Last physical: 2/21/2018 Last MTM visit: Visit date not found   Next visit within 3 mo: Visit date not found  Next physical within 3 mo: Visit date not found  Prescriber OR PCP: Satya De La Cruz MD  Last diagnosis associated with med order: 1. Hypothyroidism, unspecified type  - levothyroxine (SYNTHROID, LEVOTHROID) 50 MCG tablet [Pharmacy Med Name: LEVOTHYROXINE 50 MCG TABLET]; TAKE 1 TABLET (50 MCG TOTAL) BY MOUTH DAILY AT 6:00 AM.  Dispense: 90 tablet; Refill: 1    If protocol passes may refill for 12 months if within 3 months of last provider visit (or a total of 15 months).             Passed - TSH on file in past 12 months for patient age 12 & older     TSH   Date Value Ref Range Status   10/23/2019 0.03 (L) 0.30 - 5.00 uIU/mL Final

## 2021-06-03 ENCOUNTER — MEDICAL CORRESPONDENCE (OUTPATIENT)
Dept: HEALTH INFORMATION MANAGEMENT | Facility: CLINIC | Age: 71
End: 2021-06-03

## 2021-06-03 VITALS — WEIGHT: 116 LBS | BODY MASS INDEX: 20.88 KG/M2

## 2021-06-03 RX ORDER — FAMOTIDINE 20 MG/1
TABLET, FILM COATED ORAL
Qty: 180 TABLET | Refills: 3 | Status: SHIPPED | OUTPATIENT
Start: 2021-06-03 | End: 2022-05-24

## 2021-06-03 NOTE — TELEPHONE ENCOUNTER
RN cannot approve Refill Request: Metformin    RN can NOT refill this medication PCP messaged that patient is overdue for Labs. Last office visit: 10/23/2019 Satya De La Cruz MD Last Physical: 2/21/2018 Last MTM visit: Visit date not found Last visit same specialty: 10/23/2019 Satya De La Cruz MD.  Next visit within 3 mo: Visit date not found  Next physical within 3 mo: Visit date not found    Refill Approved: Famotidine    Rx renewed per Medication Renewal Policy. Medication was last renewed on 10/30/2018 with 3 refills.  Last office visit: 10/23/2019 with PCP Dr REJI Pettit, [facility] Triage/Med Refill 11/10/2019     Requested Prescriptions   Pending Prescriptions Disp Refills     famotidine (PEPCID) 20 MG tablet [Pharmacy Med Name: FAMOTIDINE 20 MG TABLET] 180 tablet 3     Sig: TAKE 1 TABLET BY MOUTH TWICE A DAY       GI Medications Refill Protocol Passed - 11/10/2019 11:09 AM        Passed - PCP or prescribing provider visit in last 12 or next 3 months.     Last office visit with prescriber/PCP: 10/23/2019 Satya De La Cruz MD OR same dept: 10/23/2019 Satya De La Cruz MD OR same specialty: 10/23/2019 Satya De La Cruz MD  Last physical: 2/21/2018 Last MTM visit: Visit date not found   Next visit within 3 mo: Visit date not found  Next physical within 3 mo: Visit date not found  Prescriber OR PCP: Satya De La Cruz MD  Last diagnosis associated with med order: 1. Gastroesophageal reflux disease, esophagitis presence not specified  - famotidine (PEPCID) 20 MG tablet [Pharmacy Med Name: FAMOTIDINE 20 MG TABLET]; TAKE 1 TABLET BY MOUTH TWICE A DAY  Dispense: 180 tablet; Refill: 3    2. Diabetes mellitus type 2, controlled (H)  - metFORMIN (GLUCOPHAGE-XR) 500 MG 24 hr tablet [Pharmacy Med Name: METFORMIN  MG TABLET]; Take 1 tablet (500 mg total) by mouth daily with breakfast.  Dispense: 90 tablet; Refill: 3    If protocol passes may refill for 12 months if within 3 months of last provider visit (or  a total of 15 months).             metFORMIN (GLUCOPHAGE-XR) 500 MG 24 hr tablet [Pharmacy Med Name: METFORMIN  MG TABLET] 90 tablet 3     Sig: TAKE 1 TABLET (500 MG TOTAL) BY MOUTH DAILY WITH BREAKFAST.       Metformin Refill Protocol Failed - 11/10/2019 11:09 AM        Failed - Microalbumin in last year      Microalbumin, Random Urine   Date Value Ref Range Status   06/05/2018 <0.50 0.00 - 1.99 mg/dL Final                  Passed - Blood pressure in last 12 months     BP Readings from Last 1 Encounters:   08/21/19 136/66             Passed - LFT or AST or ALT in last 12 months     Albumin   Date Value Ref Range Status   10/23/2019 3.8 3.5 - 5.0 g/dL Final     Bilirubin, Total   Date Value Ref Range Status   10/23/2019 0.4 0.0 - 1.0 mg/dL Final     Alkaline Phosphatase   Date Value Ref Range Status   10/23/2019 77 45 - 120 U/L Final     AST   Date Value Ref Range Status   10/23/2019 19 0 - 40 U/L Final     ALT   Date Value Ref Range Status   10/23/2019 13 0 - 45 U/L Final     Protein, Total   Date Value Ref Range Status   10/23/2019 6.7 6.0 - 8.0 g/dL Final                Passed - GFR or Serum Creatinine in last 6 months     GFR MDRD Non Af Amer   Date Value Ref Range Status   10/23/2019 >60 >60 mL/min/1.73m2 Final     GFR MDRD Af Amer   Date Value Ref Range Status   10/23/2019 >60 >60 mL/min/1.73m2 Final             Passed - Visit with PCP or prescribing provider visit in last 6 months or next 3 months     Last office visit with prescriber/PCP: 10/23/2019 OR same dept: 10/23/2019 Satya De La Cruz MD OR same specialty: 10/23/2019 Satya De La Cruz MD Last physical: Visit date not found Last MTM visit: Visit date not found         Next appt within 3 mo: Visit date not found  Next physical within 3 mo: Visit date not found  Prescriber OR PCP: Satya De La Cruz MD  Last diagnosis associated with med order: 1. Gastroesophageal reflux disease, esophagitis presence not specified  - famotidine (PEPCID) 20 MG tablet  [Pharmacy Med Name: FAMOTIDINE 20 MG TABLET]; TAKE 1 TABLET BY MOUTH TWICE A DAY  Dispense: 180 tablet; Refill: 3    2. Diabetes mellitus type 2, controlled (H)  - metFORMIN (GLUCOPHAGE-XR) 500 MG 24 hr tablet [Pharmacy Med Name: METFORMIN  MG TABLET]; Take 1 tablet (500 mg total) by mouth daily with breakfast.  Dispense: 90 tablet; Refill: 3     If protocol passes may refill for 12 months if within 3 months of last provider visit (or a total of 15 months).           Passed - A1C in last 6 months     Hemoglobin A1c   Date Value Ref Range Status   10/23/2019 6.1 (H) 3.5 - 6.0 % Final

## 2021-06-03 NOTE — TELEPHONE ENCOUNTER
Refill Approved    Rx renewed per Medication Renewal Policy. Medication was last renewed on 2/8/19.    Rosita Fan, [facility] Triage/Med Refill 11/6/2019     Requested Prescriptions   Pending Prescriptions Disp Refills     atorvastatin (LIPITOR) 10 MG tablet [Pharmacy Med Name: ATORVASTATIN 10 MG TABLET] 90 tablet 0     Sig: TAKE 1 TABLET BY MOUTH EVERYDAY AT BEDTIME       Statins Refill Protocol (Hmg CoA Reductase Inhibitors) Passed - 11/6/2019  2:17 AM        Passed - PCP or prescribing provider visit in past 12 months      Last office visit with prescriber/PCP: 10/23/2019 Satya De La Cruz MD OR same dept: 10/23/2019 Satya De La Cruz MD OR same specialty: 10/23/2019 Satya De La Cruz MD  Last physical: 2/21/2018 Last MTM visit: Visit date not found   Next visit within 3 mo: Visit date not found  Next physical within 3 mo: Visit date not found  Prescriber OR PCP: Satya De La Cruz MD  Last diagnosis associated with med order: 1. Hyperlipidemia  - atorvastatin (LIPITOR) 10 MG tablet [Pharmacy Med Name: ATORVASTATIN 10 MG TABLET]; TAKE 1 TABLET BY MOUTH EVERYDAY AT BEDTIME  Dispense: 90 tablet; Refill: 0    If protocol passes may refill for 12 months if within 3 months of last provider visit (or a total of 15 months).

## 2021-06-04 VITALS
HEART RATE: 68 BPM | WEIGHT: 107 LBS | DIASTOLIC BLOOD PRESSURE: 70 MMHG | BODY MASS INDEX: 19.26 KG/M2 | SYSTOLIC BLOOD PRESSURE: 142 MMHG

## 2021-06-04 VITALS
DIASTOLIC BLOOD PRESSURE: 58 MMHG | SYSTOLIC BLOOD PRESSURE: 119 MMHG | BODY MASS INDEX: 19.58 KG/M2 | OXYGEN SATURATION: 100 % | HEART RATE: 60 BPM | WEIGHT: 108.8 LBS

## 2021-06-05 VITALS
SYSTOLIC BLOOD PRESSURE: 140 MMHG | HEART RATE: 61 BPM | DIASTOLIC BLOOD PRESSURE: 66 MMHG | OXYGEN SATURATION: 98 % | BODY MASS INDEX: 19.95 KG/M2 | WEIGHT: 112.6 LBS

## 2021-06-05 VITALS
SYSTOLIC BLOOD PRESSURE: 140 MMHG | HEART RATE: 55 BPM | DIASTOLIC BLOOD PRESSURE: 72 MMHG | HEIGHT: 63 IN | BODY MASS INDEX: 19.49 KG/M2 | OXYGEN SATURATION: 99 % | WEIGHT: 110 LBS

## 2021-06-05 VITALS
SYSTOLIC BLOOD PRESSURE: 108 MMHG | DIASTOLIC BLOOD PRESSURE: 58 MMHG | HEART RATE: 64 BPM | WEIGHT: 103.6 LBS | OXYGEN SATURATION: 97 % | BODY MASS INDEX: 18.35 KG/M2

## 2021-06-05 NOTE — PATIENT INSTRUCTIONS - HE
Call [facility] to set up a neurology consultation and to establish care for ongoing management of your Alzheimer's type dementia.  [phone].    Follow-up with Dr. Satya De La Cruz in 2 months for routine checkup.  We will help you schedule that today.    Recheck thyroid labs today.  It appears as though you are either taking too much of your thyroid medication, or are being prescribed too much.    Please make sure that you are taking your medications as prescribed.    Review your CODE STATUS with your daughters.    Designate a medical and financial power of .    Blood pressure and other vital signs look okay today.

## 2021-06-05 NOTE — PROGRESS NOTES
Clinic Note    Assessment:     Assessment and Plan:  1. Memory loss  I placed another order for neurology consult today.  I gave her/her daughter/her granddaughter the number for [facility] so that they could call and schedule.  Unclear if the patient is taking her donezepil as instructed.  No recent safety events.  She is no longer driving.  - Ambulatory referral to Neurology    2. Hypothyroidism, unspecified type  At prior visit, TSH came back abnormally low.  Again, question whether patient is taking her medications appropriately.  Recheck TSH today.  - Thyroid Stimulating Hormone (TSH)    3. Type 2 diabetes mellitus without complication, without long-term current use of insulin (H)  No reports of hypoglycemia.  Prior A1c within normal limits.  She uses metformin 500 mg daily.  We will hold off on rechecking A1c today.    4. Essential hypertension  Blood pressure 142/70.  She uses lisinopril 5 mg daily.       Patient Instructions   Call [facility] to set up a neurology consultation and to establish care for ongoing management of your Alzheimer's type dementia.  [phone].    Follow-up with Dr. Satya De La Cruz in 2 months for routine checkup.  We will help you schedule that today.    Recheck thyroid labs today.  It appears as though you are either taking too much of your thyroid medication, or are being prescribed too much.    Please make sure that you are taking your medications as prescribed.    Review your CODE STATUS with your daughters.    Designate a medical and financial power of .    Blood pressure and other vital signs look okay today.    Return in about 2 months (around 3/29/2020).         Subjective:      Courtney Singh is a 69 y.o. female who comes the clinic today for routine follow-up of her chronic medical conditions.    She had neuropsychiatric testing this past October, 2019.  Those findings were consistent with Alzheimer's dementia, with significant  deficits.    Family has noticed progressive memory loss.  She is forgetting conversations.  She is getting lost when driving but has not had any accidents.    At her last appointment, PCP instructed her to stop driving.  Patient and family report to me the patient is no longer driving.  Daughter and granddaughter are helping her get to appointments.    Diabetes has historically been well controlled on metformin 500 mg/day.  She does not check her blood sugars on a regular basis.  No symptoms of hypoglycemia.  She is tolerating her metformin.  Kidney labs have been normal.    She uses lisinopril 5 mg daily for blood pressure.  Blood pressure historically well controlled with no evidence of orthostasis or falls.    At her last appointment, TSH was checked- patient is on a stable dose of Synthroid, 50 mcg daily.  At that time, results came back at 0.03.  There is a question of whether she was taking her Synthroid medication appropriately.    She was told to start donezepil 5 mg once daily to help with her memory.  Referral to neurology was in for management of her memory loss.    She was told to schedule a driving assessment, if she were to want to continue driving.    Unfortunately, she did not follow-up with her PCP as instructed.  She is unsure if she is taking the donezepil.  She has not reported any issues with nausea or vomiting.  No GI upset.    Daughter and granddaughter report patient safe at home.  No recent safety events or falls.  No wandering.    The following portions of the patient's history were reviewed and updated as appropriate: Allergies, medications, problems, prior note.    Review of Systems:    Review is otherwise negative except for what is mentioned above.     Social Hx:    Social History     Tobacco Use   Smoking Status Never Smoker   Smokeless Tobacco Never Used         Objective:     Vitals:    01/29/20 1617   BP: 142/70   Pulse: 68   Weight: 107 lb (48.5 kg)       Exam:    General: No  apparent distress. Calm.Pt behavior is appropriate.  Accompanied by daughter and granddaughter      Patient Active Problem List   Diagnosis     Pure hypercholesterolemia     Anxiety     Type 2 diabetes mellitus without complication (H)     CXR Lungs Solitary Pulmonary Nodule (___ Cm)     Chronic Reflux Esophagitis     Osteoporosis     Essential hypertension     Medication monitoring encounter     Hypothyroidism, unspecified type     Hypercholesterolemia     Current Outpatient Medications   Medication Sig Dispense Refill     ACCU-CHEK SMARTVIEW TEST STRIP strips TEST ONCE DAILY. 100 strip 3     atorvastatin (LIPITOR) 10 MG tablet TAKE 1 TABLET BY MOUTH EVERYDAY AT BEDTIME 90 tablet 3     blood glucose meter (GLUCOMETER) Test one time daily. DX: E11.9 Dispense brand per patient's insurance at pharmacy discretion. 1 each 0     blood glucose test strips Test one time daily. DX: E11.9 Dispense brand per patient's insurance at pharmacy discretion. 100 strip 11     donepezil (ARICEPT) 5 MG tablet Take 1 tablet (5 mg total) by mouth at bedtime. 30 tablet 4     famotidine (PEPCID) 20 MG tablet Take 1 tablet (20 mg total) by mouth 2 (two) times a day. 180 tablet 3     fluticasone (FLONASE) 50 mcg/actuation nasal spray 2 sprays into each nostril daily. 48 g 3     generic lancets (FINGERSTIX LANCETS) Test one time daily. DX: E11.9 Dispense brand per patient's insurance at pharmacy discretion. 100 each 11     lancets (ULTRA THIN LANCETS) 30 gauge Misc Test sugars 1-2 time per week 100 each 3     levothyroxine (SYNTHROID, LEVOTHROID) 50 MCG tablet TAKE 1 TABLET (50 MCG TOTAL) BY MOUTH DAILY AT 6:00 AM. 90 tablet 3     lisinopril (PRINIVIL,ZESTRIL) 5 MG tablet Take 1 tablet (5 mg total) by mouth daily. 90 tablet 3     metFORMIN (GLUCOPHAGE-XR) 500 MG 24 hr tablet TAKE 1 TABLET (500 MG TOTAL) BY MOUTH DAILY WITH BREAKFAST. 90 tablet 3     montelukast (SINGULAIR) 10 mg tablet TAKE 1 TABLET BY MOUTH ONE TIME DAILY AT BEDTIME (Patient  taking differently: Take 10 mg by mouth at bedtime. TAKE 1 TABLET BY MOUTH ONE TIME DAILY AT BEDTIME      ) 90 tablet 3     omeprazole (PRILOSEC) 40 MG capsule TAKE 1 CAP ONCE DAILY, 30-60 MIN. BEFORE LARGEST MEAL OF DAY, IF SYMPTOMS IMPROVE,TAKE FOR A FULL MO 90 capsule 3     No current facility-administered medications for this visit.            Juan Francisco Kaiser (Rob), DOUG    1/29/2020

## 2021-06-06 NOTE — TELEPHONE ENCOUNTER
Refill Approved    Rx renewed per Medication Renewal Policy. Medication was last renewed on 10/23/19.    Alie Kapoor, [facility] Connection Triage/Med Refill 2/23/2020     Requested Prescriptions   Pending Prescriptions Disp Refills     donepeziL (ARICEPT) 5 MG tablet 30 tablet 4     Sig: Take 1 tablet (5 mg total) by mouth at bedtime.       Cholinesterase Inhibitor/Anti-Alzheimer Agent Refill Protocol Passed - 2/21/2020 11:10 AM        Passed - Visit with PCP or prescribing provider visit in last 6 months or next 3 months     Last office visit with prescriber/PCP: 10/23/2019 OR same dept: 1/29/2020 Juan Francisco Kaiser CNP OR same specialty: 1/29/2020 Juan Francisco Kaiser CNP Last physical: Visit date not found Last MTM visit: Visit date not found     Next appt within 3 mo: Visit date not found  Next physical within 3 mo: Visit date not found  Prescriber OR PCP: Satya De La Cruz MD  Last diagnosis associated with med order: 1. Memory loss  - donepeziL (ARICEPT) 5 MG tablet; Take 1 tablet (5 mg total) by mouth at bedtime.  Dispense: 30 tablet; Refill: 4    If protocol passes may refill for 6 months if within 3 months of last provider visit (or a total of 9 months).

## 2021-06-08 NOTE — TELEPHONE ENCOUNTER
Refill Request  Medication name:   Requested Prescriptions     Pending Prescriptions Disp Refills     donepeziL (ARICEPT) 5 MG tablet 90 tablet 0     Sig: Take 1 tablet (5 mg total) by mouth at bedtime.     Who prescribed the medication: Dorothy  Requested Pharmacy: [facility]  Last appointment with PCP: 10/23/2019  Next appointment: None

## 2021-06-08 NOTE — PROGRESS NOTES
ASSESSMENT and PLAN:  1. Contracture of hand joint  We'll have her see hand for this.  - Ambulatory referral to Orthopedics    Patient Instructions   [facility]  PHONE: [phone]        CHIEF COMPLAINT:  Chief Complaint   Patient presents with     Mass     Bumps on fingers and palms, not painful but getting larger.     Ear Pain     Off and on ear pain       HISTORY OF PRESENT ILLNESS:  Courtney Singh is a 66 y.o. female  presenting to the clinic today for masses on hand.     Hand Masses: She has masses on her fingers and palms of both hands. The lumps are not painful and she has not experienced her fingers locking up. The masses on her fingers have grown in size since they first appeared.     Ear Pain: She is still experiencing intermittent ear pain. She did recently see Dr. Dorsey of [facility]. He told her that her ears were clear and normal. She has been using a neti pot daily to treat.     REVIEW OF SYSTEMS:   She has been suffering from a persistent productive cough, and has also been experiencing an intermittent sore throat. All other systems are negative.    TOBACCO USE:  History   Smoking Status     Never Smoker   Smokeless Tobacco     Never Used       VITALS:  Vitals:    01/25/17 1003   BP: 112/74   Patient Site: Right Arm   Pulse: 68   Weight: 120 lb 6 oz (54.6 kg)     Wt Readings from Last 3 Encounters:   01/25/17 120 lb 6 oz (54.6 kg)   12/15/16 118 lb (53.5 kg)   11/16/16 118 lb 3.2 oz (53.6 kg)     PHYSICAL EXAM:  Constitutional:  Reveals an alert, pleasant middle aged female.   Vitals:  Noted.   Head: Normocephalic, without obvious abnormality, atraumatic   Eyes: PERRL, conjunctiva/corneas clear, EOM's intact   Ears: TM's retracted bilaterally with small effusions bilaterally   Throat: Lips, mucosa, and tongue normal; teeth and gums normal   Neck: Normal ROM   Lungs: Clear to auscultation bilaterally, respirations unlabored.   Heart: Regular rate and rhythm, S1 and S2 normal, no murmur,  rub, or gallop,   Extremities: she has dupuytren's contracture on the left hand    Skin: Skin color, texture, turgor normal, no rashes or lesions   Neurologic: Normal     ADDITIONAL HISTORY SUMMARIZED (2): Reviewed [facility] note from 12/20/16 regarding otalgia, bilateral.   DECISION TO OBTAIN EXTRA INFORMATION (1): None.   RADIOLOGY TESTS (1): None.  LABS (1): Reviewed labs from 11/16/16.   MEDICINE TESTS (1): None.  INDEPENDENT REVIEW (2 each): None.     The visit lasted a total of 7 minutes face to face with the patient. Over 50% of the time was spent counseling and educating the patient about masses on hands.    I, Kacy Rodriguez, am scribing for and in the presence of, Dr. Shellie Cueto.    I, Dr. Shellie Cueto, personally performed the services described in this documentation, as scribed by Kacy Rodriguez in my presence, and it is both accurate and complete.    MEDICATIONS:  Current Outpatient Prescriptions   Medication Sig Dispense Refill     ACCU-CHEK SMARTVIEW TEST STRIP Strp Test once daily as directed. 50 strip 3     ALPRAZolam (XANAX) 0.25 MG tablet Take 1 tablet (0.25 mg total) by mouth 2 (two) times a day as needed for anxiety. 30 tablet 0     atorvastatin (LIPITOR) 10 MG tablet Take 1 tablet (10 mg total) by mouth bedtime. 90 tablet 3     calcium citrate 250 mg calcium Tab Take 1 tablet by mouth daily.       CYANOCOBALAMIN, VITAMIN B-12, (VITAMIN B-12 ORAL) Take 1 tablet by mouth daily.       famotidine (PEPCID) 20 MG tablet TAKE 1 TABLET BY MOUTH 2 TIMES EVERY DAY  11     fluticasone (FLONASE) 50 mcg/actuation nasal spray 2 sprays into each nostril daily. 16 g 5     LANCETS MISC Use As Directed daily. To 2x day       levothyroxine (SYNTHROID, LEVOTHROID) 50 MCG tablet TAKE ONE TABLET BY MOUTH ONE TIME DAILY 90 tablet 1     levothyroxine (SYNTHROID, LEVOTHROID) 75 MCG tablet Take 1 tablet every other day alternating with Levothyroxine 50 mcg 45 tablet 3     lisinopril (PRINIVIL,ZESTRIL) 5 MG  tablet TAKE ONE TABLET BY MOUTH ONE TIME DAILY 90 tablet 0     LORazepam (ATIVAN) 0.5 MG tablet Take 0.5-1 mg by mouth as needed for anxiety (60 minutes before dental work).       MAGNESIUM ORAL Take 1 tablet by mouth daily.       metFORMIN (GLUCOPHAGE-XR) 500 MG 24 hr tablet Take 1 tablet (500 mg total) by mouth daily with breakfast. 90 tablet 3     montelukast (SINGULAIR) 10 mg tablet Take 1 tablet (10 mg total) by mouth bedtime. 30 tablet 4     MULTIVITAMIN ORAL Take 1 tablet by mouth daily.       OMEGA-3S/DHA/EPA/FISH OIL (OMEGA 3 ORAL) Take 1 capsule by mouth daily. Omega 3-Plus Caps       No current facility-administered medications for this visit.        Total data points: 3

## 2021-06-10 NOTE — PROGRESS NOTES
Clinic Care Coordination Contact  Community Health Worker Initial Outreach    CHW Initial Information Gathering:  Referral Source: PCP  Preferred Hospital: [facility]  [phone]  Preferred Urgent Care: [facility], [phone]  Current living arrangement:: I live alone  No PCP office visit in Past Year: No  Transportation means:: Friend  CHW Additional Questions  MyChart active?: No    Patient accepts CC: Yes, scheduled with Nikkie on 8/13/2020 @ 9am by phone.  Patient was referred for medication management. She did seem a little confused during the call. Patient states she lives alone.

## 2021-06-10 NOTE — TELEPHONE ENCOUNTER
Called the patient to reschedule her physical scheduled for 10/5/20 but patient requested that the appointment be cancelled due to having another primary care provider. She stated that she was going to call to cancel this appointment. Per pt chart, pt est'd care with Ambreen Valle CNP on 8/10/20.

## 2021-06-10 NOTE — PROGRESS NOTES
Clinic Care Coordination Contact  [facility]       Clinical Data: Care Coordinator Outreach  Outreach attempted x 1.  Left message for patient today with the individual who answered the phone.   Plan: Care Coordinator will try to reach patient again in 1-2 business days.

## 2021-06-10 NOTE — PROGRESS NOTES
Assessment and Plan:     1. Type 2 diabetes mellitus without complication, without long-term current use of insulin (H)  This is controlled.  She continues Metformin.    - Glycosylated Hemoglobin A1c  - Microalbumin, Random Urine  - Ambulatory referral to Care Management (Primary Care)    2. Chronic Reflux Esophagitis  This is controlled with Famotidine.      3. Essential hypertension  This is controlled with Lisinopril.     4. Hypothyroidism, unspecified type  She continues Levothyroxine.    - Thyroid Cascade    5. Major neurocognitive disorder due to Alzheimer's disease, possible (H)  She has not been taking her Aricept and I encourage her to do so.  Will refer to care management to assist with medication management.   - Ambulatory referral to Care Management (Primary Care)    6. Hypercholesterolemia  She continues Atorvastatin.  She is not fasting today.     7. CXR Lungs Solitary Pulmonary Nodule (___ Cm)  Will obtain chest CT due to history of pulmonary nodules.    - CT Chest Without Contrast; Future    8. Medication management  - Comprehensive Metabolic Panel  She will consider obtaining the shingles vaccine from the pharmacy.     The patient is content with the plan and will follow-up in 6 months for medication management or sooner with any further concerns.       Subjective:     Courtney is a 70 y.o. female presenting to the clinic with her daughter, Bibiana Wright, to establish care.  Patient was recently diagnosed with Alzheimer's.  She lives at home with her  who has not helped her with much.  Her daughter has noticed that she has not been taking her medications, especially the Aricept.  Patient has type 2 diabetes which is controlled. Last hemoglobin A1C was 6.1% on 10/23/19.  She is prescribed metformin.  Blood pressure is been controlled with lisinopril.  She has hypothyroidism and takes levothyroxine 50 mcg daily.  She has allergic rhinitis and takes montelukast nightly and uses Flonase daily.  She  has GERD which is controlled with Famotidine.  Her chart notes that she has a history of pulmonary nodules.  I do not see a repeat CT to determine stability. She denies history of smoking or significant smoke exposure. She feels well today and has no other concerns.     Review of Systems: A complete 14 point review of systems was obtained and is negative or as stated in the history of present illness.    Social History     Socioeconomic History     Marital status:      Spouse name: Not on file     Number of children: Not on file     Years of education: Not on file     Highest education level: Not on file   Occupational History     Not on file   Social Needs     Financial resource strain: Not on file     Food insecurity     Worry: Not on file     Inability: Not on file     Transportation needs     Medical: Not on file     Non-medical: Not on file   Tobacco Use     Smoking status: Never Smoker     Smokeless tobacco: Never Used   Substance and Sexual Activity     Alcohol use: Not on file     Drug use: Not on file     Sexual activity: Not on file   Lifestyle     Physical activity     Days per week: Not on file     Minutes per session: Not on file     Stress: Not on file   Relationships     Social connections     Talks on phone: Not on file     Gets together: Not on file     Attends [employer] service: Not on file     Active member of club or organization: Not on file     Attends meetings of clubs or organizations: Not on file     Relationship status: Not on file     Intimate partner violence     Fear of current or ex partner: Not on file     Emotionally abused: Not on file     Physically abused: Not on file     Forced sexual activity: Not on file   Other Topics Concern     Not on file   Social History Narrative     Not on file       Active Ambulatory Problems     Diagnosis Date Noted     Pure hypercholesterolemia      Anxiety      Type 2 diabetes mellitus without complication (H)      CXR Lungs Solitary Pulmonary  Nodule (___ Cm)      Chronic Reflux Esophagitis      Osteoporosis      Essential hypertension 05/16/2016     Medication monitoring encounter 11/16/2017     Hypothyroidism, unspecified type 06/05/2018     Hypercholesterolemia 06/05/2018     Resolved Ambulatory Problems     Diagnosis Date Noted     Acute tonsillitis      Tendonitis      Hyperglycemia      Osteopenia      Bursitis Of The Hip      Joint Pain, Localized In The Wrist      Ankle Sprain      A Fall      Dysfunction of both eustachian tubes 10/03/2016     No Additional Past Medical History       No family history on file.    Objective:     /58 (Patient Site: Right Arm, Cuff Size: Adult Regular)   Pulse 60   Wt 108 lb 12.8 oz (49.4 kg)   SpO2 100%   BMI 19.58 kg/m      Patient is alert, in no obvious distress. She answers some questions appropriately, but did not remember having shingles when her daughter said that she did.   Skin: Warm, dry.   Lungs:  Clear to auscultation. Respirations even and unlabored.  No wheezing or rales noted.   Heart:  Regular rate and rhythm.  No murmurs, S3, S4, gallops, or rubs.    Abdomen: Soft, nontender.  No organomegaly. Bowel sounds normoactive. No guarding or masses noted.   Musculoskeletal:  No edema is present in bilateral lower extremities.

## 2021-06-10 NOTE — PROGRESS NOTES
"Clinic Care Coordination Contact    Situation: Patient chart reviewed by care coordinator.    Background: Enrolled 8/13/20 for concerns regarding medication management and compliance.    Assessment: Spoke with patient today.  She stated her daughter is completing her MSU box for 2 weeks at a time.  Both her daughter and her spouse are reminding throughout the day to take her medications per Courtney's report.  Writer attempted to obtain blood sugar readings and patient was unable to provide this stating she \"just woke up\".  Writer offered [facility] bubble pack information and patient declined.    Plan/Recommendations: Goal completed.  Patient transitioned to Maintenance.          "

## 2021-06-11 NOTE — PROGRESS NOTES
ASSESSMENT/PLAN:  1. Type 2 diabetes mellitus without complication  Well-controlled and may not actually be diabetic?  She does not take the metformin every day because she does not like the taste.  She does not wish for her A1c to ever climb.  We elected to have her continue with her current dosing is certainly this is working.  She can discuss this with her future PCP as well.  - Glycosylated Hemoglobin A1c  - JIC RED  - Microalbumin, Random Urine    2. Essential hypertension  Really well controlled and on a tiny little dose of ACE inhibitor.  She does not know that her blood pressures ever been significantly elevated and I wonder if this is really doing anything.  She can dress this with her new physician when she chooses that person.    3. Pure hypercholesterolemia  Is on a tiny little dose of statin.  It is been my practice to prove that she actually needs a statin, and if so, then she needs 40 mg of atorvastatin.    4. Hypothyroidism Postprocedural  Beautifully controlled on current dose and will continue the same.    Patient Instructions   I removed some of the supplements from the medication list; you can continue to take them. They were removed to help de-clutter the medication list.     Aim for a total of 1000 mg of calcium daily (dietary and supplemental).    Take vitamin D3, 2000 IU daily.     Establish Care with a primary care physician.      Return if symptoms worsen or fail to improve.    CHIEF COMPLAINT:  Chief Complaint   Patient presents with     Diabetes       HISTORY OF PRESENT ILLNESS:  Courtney Singh is a 67 y.o. female patient without PCP presenting to the clinic today for type II diabetes. She had a hemoglobin A1c of 5.9 today. She is supposed to be taking 500 mg of metformin once daily in the morning; she states that she does not take the metformin every day. She states that she does not check her blood sugars very often. She does her best to watch her diet.     Hypertension: She is  currently taking 5 mg of lisinopril once daily. She had a blood pressure of 112/62 in clinic today.     Hypothyroidism: She had a TSH check on 11/16/16 and it was 0.60. She is currently taking levothyroxine daily, alternating between 50 mcg and 75 mcg daily.     REVIEW OF SYSTEMS:   She denies any chest pain, tightness or pressure in the chest, shortness of breath, bowel or bladder issues, blood in the stool, black or tarry stool, trouble with neuropathy, hematuria, coughing up blood. She is taking vitamin B12 on occasion; she does not take it very often. She has noticed some swelling in her left ankle the last couple of days. She states that she does not eat a lot of salt and drinks lots of water.   Comprehensive review of systems negative except as noted above.    PFSH:  Reviewed as above.     TOBACCO USE:  History   Smoking Status     Never Smoker   Smokeless Tobacco     Never Used       VITALS:  Vitals:    06/05/17 0821   BP: 112/62   Pulse: 60   Weight: 112 lb 8 oz (51 kg)     Wt Readings from Last 3 Encounters:   06/05/17 112 lb 8 oz (51 kg)   01/25/17 120 lb 6 oz (54.6 kg)   12/15/16 118 lb (53.5 kg)     Body mass index is 20.09 kg/(m^2).    PHYSICAL EXAM:  General Appearance: Alert, cooperative, no distress, appears stated age.  Lungs: Clear to auscultation bilaterally, good air movement.  Heart: Regular rate and rhythm, S1 and S2 normal, no murmur or bruit.  Extremities: No CCE, pulses II/IV and symmetric.  Diabetic foot exam normal. Vibratory sensation intact at the toes bilateral.   Psychiatric:  She has a normal mood and affect.     Notes Reviewed, additional history from source other than patient (2 TOTAL): None.    Accessed Care Everywhere, Requested Records, Consult with Physician (1 TOTAL): None.     Radiology tests summarized or ordered (XR, CT, MRI, DXA, US) (1 TOTAL): None.    Labs reviewed or ordered (1 TOTAL): Reviewed labs from 11/12/16; CMP and TSH. Ordered A1c and microalbumin labs today.      Medicine tests reviewed or ordered (ECG, echocardiogram, colonoscopy, EGD, venous US) (1 TOTAL): None.    Independent review of ECG or XR (2 EACH): None.      The visit lasted a total of 10 minutes face to face with the patient. Over 50% of the time was spent counseling and educating the patient about diabetes.    IRamona, am scribing for and in the presence of, Dr. Schilling.    I, Dr. Schilling, personally performed the services described in this documentation, as scribed by Ramona Borjas in my presence, and it is both accurate and complete.    MEDICATIONS:  Current Outpatient Prescriptions   Medication Sig Dispense Refill     atorvastatin (LIPITOR) 10 MG tablet Take 1 tablet (10 mg total) by mouth bedtime. 90 tablet 3     blood glucose test (ACCU-CHEK SMARTVIEW TEST STRIP) strips Use 1 each As Directed as needed. Dispense brand per patient's insurance at pharmacy discretion. 50 strip 3     CYANOCOBALAMIN, VITAMIN B-12, (VITAMIN B-12 ORAL) Take 1 tablet by mouth daily.       famotidine (PEPCID) 20 MG tablet TAKE 1 TABLET BY MOUTH 2 TIMES EVERY DAY  11     lancets (ULTRA THIN LANCETS) 30 gauge Misc Test sugars 1-2 time per week 100 each 3     levothyroxine (SYNTHROID, LEVOTHROID) 50 MCG tablet TAKE ONE TABLET BY MOUTH ONE TIME DAILY 90 tablet 1     levothyroxine (SYNTHROID, LEVOTHROID) 75 MCG tablet Take 1 tablet every other day alternating with Levothyroxine 50 mcg 45 tablet 3     lisinopril (PRINIVIL,ZESTRIL) 5 MG tablet TAKE ONE TABLET BY MOUTH ONE TIME DAILY 90 tablet 2     LORazepam (ATIVAN) 0.5 MG tablet Take 0.5-1 mg by mouth as needed for anxiety (60 minutes before dental work).       metFORMIN (GLUCOPHAGE-XR) 500 MG 24 hr tablet TAKE ONE TABLET BY MOUTH EVERY DAY WITH BREAKFAST 90 tablet 0     montelukast (SINGULAIR) 10 mg tablet Take 1 tablet (10 mg total) by mouth bedtime. Must establish care with new provider before further refills will be given. 90 tablet 3     No current  facility-administered medications for this visit.        Total data points: 1

## 2021-06-11 NOTE — TELEPHONE ENCOUNTER
Refill Approved    Rx renewed per Medication Renewal Policy. Medication was last renewed on 8/25/19.    Leida Ashby, Care Connection Triage/Med Refill 8/31/2020     Requested Prescriptions   Pending Prescriptions Disp Refills     lisinopriL (PRINIVIL,ZESTRIL) 5 MG tablet 90 tablet 3     Sig: Take 1 tablet (5 mg total) by mouth daily.       Ace Inhibitors Refill Protocol Passed - 8/27/2020  2:52 PM        Passed - PCP or prescribing provider visit in past 12 months       Last office visit with prescriber/PCP: 8/10/2020 Ambreen Valle CNP OR same dept: 8/10/2020 Ambreen Valle CNP OR same specialty: 8/10/2020 Ambreen Valle CNP  Last physical: Visit date not found Last MTM visit: Visit date not found   Next visit within 3 mo: Visit date not found  Next physical within 3 mo: Visit date not found  Prescriber OR PCP: Ambreen Valle CNP  Last diagnosis associated with med order: 1. Essential hypertension  - lisinopriL (PRINIVIL,ZESTRIL) 5 MG tablet; Take 1 tablet (5 mg total) by mouth daily.  Dispense: 90 tablet; Refill: 3    If protocol passes may refill for 12 months if within 3 months of last provider visit (or a total of 15 months).             Passed - Serum Potassium in past 12 months     Lab Results   Component Value Date    Potassium 4.7 08/10/2020             Passed - Blood pressure filed in past 12 months     BP Readings from Last 1 Encounters:   08/10/20 119/58             Passed - Serum Creatinine in past 12 months     Creatinine   Date Value Ref Range Status   08/10/2020 0.85 0.60 - 1.10 mg/dL Final

## 2021-06-11 NOTE — TELEPHONE ENCOUNTER
Reason contacted:  Results   Information relayed:  Below message relayed to the patient.   Additional questions:  No  Further follow-up needed:  No  Okay to leave a detailed message:  No

## 2021-06-11 NOTE — PROGRESS NOTES
Clinic Care Coordination Contact    Background:  Pts daughter Bibiana sent MyChart message to PCP asking for private pay caregiver resources.  Pt declines the need for help at home.    UTC/Voicemail         Clinical Data: Care Coordinator Outreach  Outreach attempted x 1.  Left message on Daughter Bibiana's voicemail with call back information and requested return call to discuss caregiver resources.     Plan: Care Coordinator will try back in 3-5 business days    Clinic Care Coordination Contact  UT/Voicemail       Clinical Data: Care Coordinator Outreach  Outreach attempted x 2.  Left message on daughter Bibiana's voicemail with call back information and requested return call to discuss resources for private pay caregivers if needed.    Plan:   Care Coordinator will do no further outreaches at this time.    Pt remaining on maintenance.

## 2021-06-11 NOTE — TELEPHONE ENCOUNTER
Yonas Crook,     This patient enrolled in [facility] on 8/19/20. I will forward this on to the CHW that has been assigned to her.     Thank you,   Loli

## 2021-06-11 NOTE — PATIENT INSTRUCTIONS - HE
Discussed the CT scan. There are 2 nodules that are so small they do not require further following of them on CT scan and one of them is likely a lymph node.    There is mucus in the airways throughout and sometimes that can make people cough more, feel short of breath but in this case is asymptomatic. Given lack of symptoms and no ill effects currently, would vote to watch and wait. If symptoms like worsening shortness of breath, fever, coughing do appear, could discuss at that point. Keeping the mucus clear of the airways requires significant time and remembering to do the therapies and if it is not bothersome I do not find this necessary as it sounds like getting the regular medications each day is also difficult at times. This can sometimes happen in the setting of alzheimers and chronic aspiration. I do not think speech therapy is indicated in this setting.     Please call our clinic nurse, Megan should there be further questions or concerns.    [phone] M-F 8am-4pm.

## 2021-06-11 NOTE — PROGRESS NOTES
Pulmonary Clinic Visit    Cc: abnormal CT    HPI: 70 y.o. female with history of significant memory deficit (?Alzheimers) who presents after CT scan was performed to follow up on nodule.    This found mucus plugging throughout. SHe is unable to verify any medications and does not know why she is here today.     She denies coughing, mucus, wheezing, shortness of breath related to the mucus plugging. She does not think she coughs after eating or drinking.       PMHx:  Alzheimers  GERD  DMII  Hypertension  Hypothyroidism        Social History     Tobacco Use     Smoking status: Never Smoker     Smokeless tobacco: Never Used   Substance Use Topics     Alcohol use: Not on file         No family history on file.Unable to obtain      Current Outpatient Medications   Medication Sig     atorvastatin (LIPITOR) 10 MG tablet TAKE 1 TABLET BY MOUTH EVERYDAY AT BEDTIME     blood glucose meter (GLUCOMETER) Test one time daily. DX: E11.9 Dispense brand per patient's insurance at pharmacy discretion.     blood glucose test strips Test one time daily. DX: E11.9 Dispense brand per patient's insurance at pharmacy discretion.     donepeziL (ARICEPT) 5 MG tablet Take 1 tablet (5 mg total) by mouth at bedtime.     famotidine (PEPCID) 20 MG tablet TAKE 1 TABLET BY MOUTH TWICE A DAY     fluticasone (FLONASE) 50 mcg/actuation nasal spray 2 sprays into each nostril daily.     generic lancets (FINGERSTIX LANCETS) Test one time daily. DX: E11.9 Dispense brand per patient's insurance at pharmacy discretion.     levothyroxine (SYNTHROID, LEVOTHROID) 50 MCG tablet TAKE 1 TABLET (50 MCG TOTAL) BY MOUTH DAILY AT 6:00 AM.     lisinopriL (PRINIVIL,ZESTRIL) 5 MG tablet Take 1 tablet (5 mg total) by mouth daily.     metFORMIN (GLUCOPHAGE-XR) 500 MG 24 hr tablet TAKE 1 TABLET (500 MG TOTAL) BY MOUTH DAILY WITH BREAKFAST.     montelukast (SINGULAIR) 10 mg tablet TAKE 1 TABLET BY MOUTH ONE TIME DAILY AT BEDTIME (Patient taking differently: Take 10 mg by  mouth at bedtime. TAKE 1 TABLET BY MOUTH ONE TIME DAILY AT BEDTIME      )       Allergies   Allergen Reactions     Clindamycin Nausea And Vomiting     diarrhea     Levofloxacin      Ears buzzing     Azithromycin Rash     Sulfa (Sulfonamide Antibiotics) Rash         Review of Systems   Constitutional: Negative.    HENT: Negative.    Eyes: Negative.    Respiratory: Negative for cough, shortness of breath and wheezing.    Cardiovascular: Negative.    Gastrointestinal: Negative.    Endocrine: Negative.    Genitourinary: Negative.    Musculoskeletal: Negative.    Skin: Negative.    Allergic/Immunologic: Negative.    Neurological:        Acknowledges memory deficit   Psychiatric/Behavioral: Negative.        Vitals:    09/16/20 1324   BP: 140/72   Pulse: (!) 55   SpO2: 99%   RA  Physical Exam   Constitutional: She appears well-developed and well-nourished.   HENT:   Head: Normocephalic and atraumatic.   Eyes: Conjunctivae and EOM are normal.   Neck: Normal range of motion. No tracheal deviation present.   Cardiovascular: Normal rate and regular rhythm.   Pulmonary/Chest: Effort normal. She has no wheezes. She has no rales.   Abdominal: Soft. There is no abdominal tenderness. There is no guarding.   Musculoskeletal: Normal range of motion.         General: No edema.   Lymphadenopathy:     She has no cervical adenopathy.   Neurological: She is alert.   Significant memory issues--did not remember why she was referred at the end of the visit despite our discussion at the beginning of the visit.    Skin: Skin is warm and dry.     CT chest: personally reviewed  EXAM: CT CHEST WO CONTRAST  LOCATION: [facility]  DATE/TIME: 8/31/2020 3:19 PM     INDICATION: Lung nodule, < 1cm, low risk pulmonary nodules; determine stability  COMPARISON: CT of the chest 04/10/2014  TECHNIQUE: CT chest without IV contrast. Multiplanar reformats were obtained. Dose reduction techniques were used.  CONTRAST: None.     FINDINGS:   LUNGS AND  PLEURA: Punctate, 1 to 2 mm subpleural nodule in the posterior medial left upper lobe (series 4, image 69) is unchanged from 2014. A triangular-shaped subpleural nodule in the lateral left lower lobe measuring 3-4 mm (series 4, image 163) is   also unchanged and has typical features of an intrapulmonary lymph node. No new or enlarging lung nodules.     The trachea and central airways are patent. There are foci of inspissated material within multiple subsegmental airways in the lobes of the right lung and lesser extent the lingula without resulting in downstream atelectasis. No lung consolidation.     No pleural space abnormality.     MEDIASTINUM: Cardiac chambers are normal in size. Physiologic pericardial fluid is present. Fairly extensive atheromatous calcifications in the LAD distribution and mild RCA atheromatous calcification. Normal caliber thoracic aorta with conventional arch   anatomy. Minimal atheromatous calcification in the descending aorta.     No enlarged mediastinal or hilar lymph nodes. Decompressed esophagus.     UPPER ABDOMEN: 2 to 3 mm nonobstructing calculus in a left renal calyx.     MUSCULOSKELETAL: Slight anterior wedging of T12. Other thoracic vertebra are maintained in height and shape. No acute fractures or bone lesions. Chest wall soft tissues are symmetric.     IMPRESSION:      1.  A few small lung nodules are present, unchanged from 2014. These nodules are benign and do not require specific additional imaging follow-up.  2.  Foci of inspissated material in multiple subsegmental airways in the right lung and lingula without lung consolidation or atelectasis. The pattern is non-specific and could relate to retained secretions, aspiration or sequela of other airways   inflammatory process.       Assessment/Plan  70yoF with reported Alzheimers with mucus plugging remaining asymptomatic. The mucus plugging can result from frequent aspiration which can be tied to dementia. INterventions such  as speech therapy would be unlikely to help given memory issues. In addition bronchoscopy would be invasive and with lack of symptoms is not indicated.    She should return if symptoms change.  No follow up scheduled for now.     Gavi Easton MD  Electronically signed on 9/18/2020 1:23 PM

## 2021-06-11 NOTE — TELEPHONE ENCOUNTER
----- Message from Ambreen Valle CNP sent at 9/4/2020  7:55 PM CDT -----  Please notify the patient and her family that her chest CT shows multiple small nodules which are stable, benign, and do not require further evaluation.  There was an area of thickened material in the right lung.  The radiologist believes this could be retained secretions, she inhaled a foreign body (such as food) or there is an airway inflammatory process.  I am referring her to pulmonology for further evaluation.  Thanks.

## 2021-06-12 NOTE — TELEPHONE ENCOUNTER
Called and spoke with patients home care nurse.  She requested patients medication list is faxed to her at [phone].  She is requesting a prescription for a glucometer, test strips, and lancets for patient to check her blood sugar as she reports she does not have any of these supplies at home.   Reviewed patients LDL results with her and the need for patient to increase her atorvastatin to 20 mg daily - she will notify the patient and her daughter of this information.

## 2021-06-12 NOTE — TELEPHONE ENCOUNTER
I spoke with patients home care nurse yesterday and she was going to notify the patient and her daughter of these results.

## 2021-06-12 NOTE — TELEPHONE ENCOUNTER
Orders being requested: skilled nursing   10/17/20 2 times a week for 2 weeks , 1 time a week for 3 weeks ,0 visits for 1 week and  1 times a week for 1 week  Medication management and disease mangement  Reason service is needed/diagnosis: alzheimer   When are orders needed by: asap  Where to send Orders: Phone:  [phone]  Okay to leave detailed message?  Yes

## 2021-06-12 NOTE — TELEPHONE ENCOUNTER
Refill Request  Did you contact pharmacy: No  Medication name:   Requested Prescriptions     Pending Prescriptions Disp Refills     metFORMIN (GLUCOPHAGE-XR) 500 MG 24 hr tablet 90 tablet 3     Sig: Take 1 tablet (500 mg total) by mouth daily with breakfast.     Who prescribed the medication: Satya De La Cruz MD  [facility]  Is patient out of medication: Yes  Patient notified refills processed in 3 business days:  yes  Okay to leave a detailed message: yes

## 2021-06-12 NOTE — PROGRESS NOTES
Clinic Care Coordination Contact  [facility]/Voicemail       Clinical Data: Care Coordinator Outreach  Outreach attempted x 1.  Left message on patient's voicemail with call back information and requested return call.  Plan: Care Coordinator will try to reach patient again in 10 business days.

## 2021-06-12 NOTE — TELEPHONE ENCOUNTER
I am unable to find documentation that patients Aricept was increased from 5 mg to 10 mg. I see her atorvastatin was increased to 20 mg but I have yet to reach the patient to inform her of this.     Please advise

## 2021-06-12 NOTE — PROGRESS NOTES
Clinic Care Coordination Contact  [facility]/Voicemail       Clinical Data: Care Coordinator Outreach  Outreach attempted x 1.  Left message on patient's voicemail with call back information and requested return call.  Plan:  Care Coordinator will try to reach patient again in 3-5 business days.  Next outreach due: 11/11/2020

## 2021-06-12 NOTE — TELEPHONE ENCOUNTER
----- Message from Ambreen Valle CNP sent at 10/14/2020  5:48 PM CDT -----  Please notify the patient's family that her LDL (bad cholesterol) is elevated.  I increased her Atorvastatin to 20 mg daily.  I recommend a lab recheck in 3 months. Thanks.

## 2021-06-12 NOTE — TELEPHONE ENCOUNTER
We received a home care referral for this patient. Due to our capacity, this referral was vended to [facility] home care.

## 2021-06-12 NOTE — TELEPHONE ENCOUNTER
Reason contacted:  Orders request  Information relayed:  Notified ok for requested order per Dr. Daniels.   Additional questions:  No  Further follow-up needed:  No  Okay to leave a detailed message:  No

## 2021-06-12 NOTE — PROGRESS NOTES
Assessment and Plan:     1. Major neurocognitive disorder due to Alzheimer's disease, possible (H)  I completed [facility] paperwork for patient's daughter.  Will rule out vitamin B12 deficiency and urinary tract infection.  She continues Aricept.  I encouraged follow-up with neurology.  Patient has had difficulty taking her medications regularly and has been confusing her medication.  Her  is not assisting her.  Her daughters are concerned about this.  Face-to-face encounter was performed today and an order was placed for home health for complex medication management.  - Vitamin B12  - Urinalysis-UC if Indicated  - Culture, Urine    2. Pure hypercholesterolemia  We will check LDL today as she has not had her cholesterol checked for multiple years.  She continues a atorvastatin.  - LDL Cholesterol, Direct    3. Postmenopausal  Discussed adequate calcium and vitamin D intake.  She is due for a bone density scan.  - DXA Bone Density Scan; Future    4. Type 2 diabetes mellitus without complication, without long-term current use of insulin (H)  This is controlled with metformin.    5. Essential hypertension  This is controlled with lisinopril.    6. Hypothyroidism, unspecified type  This is controlled with levothyroxine.    7. Gastroesophageal reflux disease, unspecified whether esophagitis present  Discussed that patient could certainly discontinue famotidine if she is asymptomatic.  I encouraged her to resume the medication if symptoms return.    8. Allergic rhinitis, unspecified seasonality, unspecified trigger  Patient may consider taking montelukast and Flonase only during allergy season.  She is to follow-up in 6 months for medication management or sooner with any further concerns.  Patient is content with the plan.        Subjective:     Courtney is a 70 y.o. female presenting to the clinic for a face-to-face encounter for medication management and other concerns.  She is present with her daughter, Liza, for  completion of paperwork.  Liza works for [employer] and requires paperwork to assist her mother with going to follow-up with her appointments.  Patient has been diagnosed with Alzheimer's and is taking Aricept.  She lives at home with her .  Patient has type 2 diabetes which is controlled with metformin  mg daily.  Last hemoglobin A1c was 5.6% on 8/10/2020.  She is taking atorvastatin for hyperlipidemia.  She has not had a cholesterol checked for multiple years.  She is not fasting today.  She takes lisinopril for renal protection and hypertension which is well controlled.  She is taking levothyroxine 50 mcg daily for hypothyroidism.  She takes famotidine for GERD.  Patient's daughter is questioning whether or not she still needs this medication.  She has not complained about heartburn symptoms for quite some time.  She takes montelukast daily and uses Flonase for allergies.  Patient denies any concerns regarding allergy symptoms.    Review of Systems: A complete 14 point review of systems was obtained and is negative or as stated in the history of present illness.    Social History     Socioeconomic History     Marital status:      Spouse name: Not on file     Number of children: 3     Years of education: Not on file     Highest education level: Not on file   Occupational History     Not on file   Social Needs     Financial resource strain: Not on file     Food insecurity     Worry: Never true     Inability: Never true     Transportation needs     Medical: No     Non-medical: No   Tobacco Use     Smoking status: Never Smoker     Smokeless tobacco: Never Used   Substance and Sexual Activity     Alcohol use: Not on file     Drug use: Not on file     Sexual activity: Not on file   Lifestyle     Physical activity     Days per week: 5 days     Minutes per session: 30 min     Stress: Not on file   Relationships     Social connections     Talks on phone: Twice a week     Gets together: Not on  file     Attends Confucianist service: Not on file     Active member of club or organization: Not on file     Attends meetings of clubs or organizations: Not on file     Relationship status:      Intimate partner violence     Fear of current or ex partner: Not on file     Emotionally abused: Not on file     Physically abused: Not on file     Forced sexual activity: Not on file   Other Topics Concern     Not on file   Social History Narrative     Not on file       Active Ambulatory Problems     Diagnosis Date Noted     Pure hypercholesterolemia      Anxiety      Type 2 diabetes mellitus without complication (H)      Chronic Reflux Esophagitis      Osteoporosis      Essential hypertension 05/16/2016     Medication monitoring encounter 11/16/2017     Hypothyroidism, unspecified type 06/05/2018     Hypercholesterolemia 06/05/2018     Major neurocognitive disorder due to Alzheimer's disease, possible (H) 08/10/2020     Mucus plugging of bronchi 09/16/2020     Resolved Ambulatory Problems     Diagnosis Date Noted     Acute tonsillitis      Tendonitis      Hyperglycemia      Osteopenia      Bursitis Of The Hip      Joint Pain, Localized In The Wrist      Ankle Sprain      A Fall      Dysfunction of both eustachian tubes 10/03/2016     No Additional Past Medical History       No family history on file.    Objective:     /66 (Patient Site: Left Arm, Patient Position: Sitting, Cuff Size: Adult Regular)   Pulse 61   Wt 112 lb 9.6 oz (51.1 kg)   SpO2 98%   BMI 19.95 kg/m      Patient is alert, in no obvious distress.  She answers questions appropriately.  Skin: Warm, dry.   Neck: Supple, no lymphadenopathy.No thyromegaly.  Lungs:  Clear to auscultation. Respirations even and unlabored.  No wheezing or rales noted.   Heart:  Regular rate and rhythm.  No murmurs, S3, S4, gallops, or rubs.    Abdomen: Soft, nontender.  No organomegaly. Bowel sounds normoactive. No guarding or masses noted.   Musculoskeletal: No edema  is present in bilateral lower extremities.

## 2021-06-12 NOTE — TELEPHONE ENCOUNTER
Orders being requested: late start of care for skilled nursing 10/19/20  Reason service is needed/diagnosis: alzheimer , dementia , diabetes type 2   When are orders needed by: asap  Where to send Orders: Phone:  [phone]  Okay to leave detailed message?  Yes

## 2021-06-12 NOTE — PROGRESS NOTES
Clinic Care Coordination Contact    Follow Up Progress Note      Assessment: Daughter contacted PCP regarding medication management for patient.  Previous [facility] was unable to reach daughter.  Patient on maintenance status.  Call to daughter, consent to contact on file.  She is concerned that her mother is unable to take her medications correctly and her spouse is unable/unwilling to help her.  Daughter is not able to talk as she is at work, but asked [facility] to contact granddaughter Siena, also on consent to communicate.  Spouse is frail and he unwilling to provide support for her medications.  Siena sometimes attends PCP appointments but she now has a new job and is not as available.  Reviewed that Medicare home care may be able to supply a nurse for a short period of time and help assess and figure out options for taking medications safely.  They would need to agree to that service however. She is not able to leave the house without someone with her.  They want to stay in their own home.  Siena is unsure what their financial situation is.      Siena is unsure if spouse would agree to this.  Patient has PCP appointment on 10-13 and spouse will take her.  Siena would like PCP to discuss home care to see if they would agree with it.  Patient is making her own decisions at this time.  She will discuss this strategy with her mom and aunt.  If they want to do something different, they will contact [facility].      Intervention/Education provided during outreach: Reviewed Medicare home care.  Patient will need appointment with PCP before it can be ordered.            Plan:   Siena will discuss with her mom and aunt.  [facility] will ask PCP to discuss at appointment on 10-13 and see if patient will agree to having nurse ordered to help her set up her meds and to come up with long term plan.   Care Coordinator will follow up in two weeks.    Brandy Gorman [facility]  Clinic Care Coordinator  [phone]

## 2021-06-12 NOTE — TELEPHONE ENCOUNTER
Refill Approved    Rx renewed per Medication Renewal Policy. Medication was last renewed on 11/11/2019.  Last office visit was 10/13/2020 with PCP.    Yin Rivera, [facility] Triage/Med Refill 10/15/2020     Requested Prescriptions   Pending Prescriptions Disp Refills     metFORMIN (GLUCOPHAGE-XR) 500 MG 24 hr tablet 90 tablet 3     Sig: Take 1 tablet (500 mg total) by mouth daily with breakfast.       Metformin Refill Protocol Passed - 10/15/2020  7:48 AM        Passed - Blood pressure in last 12 months     BP Readings from Last 1 Encounters:   10/13/20 140/66             Passed - LFT or AST or ALT in last 12 months     Albumin   Date Value Ref Range Status   08/10/2020 4.1 3.5 - 5.0 g/dL Final     Bilirubin, Total   Date Value Ref Range Status   08/10/2020 0.5 0.0 - 1.0 mg/dL Final     Alkaline Phosphatase   Date Value Ref Range Status   08/10/2020 64 45 - 120 U/L Final     AST   Date Value Ref Range Status   08/10/2020 20 0 - 40 U/L Final     ALT   Date Value Ref Range Status   08/10/2020 14 0 - 45 U/L Final     Protein, Total   Date Value Ref Range Status   08/10/2020 7.1 6.0 - 8.0 g/dL Final                Passed - GFR or Serum Creatinine in last 6 months     GFR MDRD Non Af Amer   Date Value Ref Range Status   08/10/2020 >60 >60 mL/min/1.73m2 Final     GFR MDRD Af Amer   Date Value Ref Range Status   08/10/2020 >60 >60 mL/min/1.73m2 Final             Passed - Visit with PCP or prescribing provider visit in last 6 months or next 3 months     Last office visit with prescriber/PCP: 10/13/2020 OR same dept: 10/13/2020 Ambreen Valle CNP OR same specialty: 10/13/2020 Ambreen Valle CNP Last physical: Visit date not found Last MTM visit: Visit date not found         Next appt within 3 mo: Visit date not found  Next physical within 3 mo: Visit date not found  Prescriber OR PCP: Ambreen Valle CNP  Last diagnosis associated with med order: 1. Diabetes mellitus type 2, controlled (H)  - metFORMIN  (GLUCOPHAGE-XR) 500 MG 24 hr tablet; Take 1 tablet (500 mg total) by mouth daily with breakfast.  Dispense: 90 tablet; Refill: 3     If protocol passes may refill for 12 months if within 3 months of last provider visit (or a total of 15 months).           Passed - A1C in last 6 months     Hemoglobin A1c   Date Value Ref Range Status   08/10/2020 5.6 <=5.6 % Final     Comment:     Normal <5.7% Prediabete 5.7-6.4% Diabletes 6.5% or higher - adopted from ADA consensus guidelines               Passed - Microalbumin in last year      Microalbumin, Random Urine   Date Value Ref Range Status   08/10/2020 3.61 (H) 0.00 - 1.99 mg/dL Final

## 2021-06-12 NOTE — TELEPHONE ENCOUNTER
Left message to call back for: orders request  Information to relay to patient:  Notified ok for requested order per Dr. Daniels via VM. Please advise if this is not ok.

## 2021-06-12 NOTE — TELEPHONE ENCOUNTER
Refill Approved    Rx renewed per Medication Renewal Policy. Medication was last renewed on 10/24/19.    Ov: 8/10/2020    Reyna Alejandre, [facility] Triage/Med Refill 10/3/2020     Requested Prescriptions   Pending Prescriptions Disp Refills     levothyroxine (SYNTHROID, LEVOTHROID) 50 MCG tablet 90 tablet 3     Sig: Take 1 tablet (50 mcg total) by mouth Daily at 6:00 am.       Thyroid Hormones Protocol Passed - 9/30/2020  9:33 AM        Passed - Provider visit in past 12 months or next 3 months     Last office visit with prescriber/PCP: 10/23/2019 Satya De La Cruz MD OR same dept: 1/29/2020 Juan Francisco Kaiser CNP OR same specialty: 1/29/2020 Juan Francisco Kaiser CNP  Last physical: 2/21/2018 Last MTM visit: Visit date not found   Next visit within 3 mo: Visit date not found  Next physical within 3 mo: Visit date not found  Prescriber OR PCP: Satya De La Cruz MD  Last diagnosis associated with med order: 1. Hypothyroidism, unspecified type  - levothyroxine (SYNTHROID, LEVOTHROID) 50 MCG tablet; Take 1 tablet (50 mcg total) by mouth Daily at 6:00 am.  Dispense: 90 tablet; Refill: 3    If protocol passes may refill for 12 months if within 3 months of last provider visit (or a total of 15 months).             Passed - TSH on file in past 12 months for patient age 12 & older     TSH   Date Value Ref Range Status   08/10/2020 1.67 0.30 - 5.00 uIU/mL Final

## 2021-06-12 NOTE — TELEPHONE ENCOUNTER
Medication Question or Clarification  Who is calling: home care nurse   What medication are you calling about (include dose and sig)?: donepeziL (ARICEPT) 5 MG tablet  Who prescribed the medication?: Ambreen Valle CNP    What is your question/concern?: was told by patient daughter that the Aricept was changed to 10 mg . Please clarify is this is accurate     [facility]  Okay to leave a detailed message?: Yes

## 2021-06-13 NOTE — PROGRESS NOTES
[facility] Clinic Follow Up Note    Courtney Singh   67 y.o. female    Date of Visit: 10/5/2017    Chief Complaint   Patient presents with     Annual Exam     fasting     Subjective  Courtney is here for health maintenance physical exam.  Has previously seen a physician here, transferring medical care to me.    He has had borderline elevated blood sugars in the past, but recent blood sugars have been normal.  Hemoglobin A1c's have been normal and last checked in June was 5.9%.  She rarely takes her Metformin.  L is a diabetic diet, is thin.  She walks every day at the mall with a friend.  Her graph urine microalbumin is normal in June.  Saw ophthalmology in September with no retinopathy.  There is no foot sores or neuropathy.    He does not take the lisinopril on a regular basis, about twice a week.  She has occasional lightheaded dizzy spells, but also has a vertigo type dizziness.  Her blood pressure is usually in the 130s over 70s.  She took the lisinopril 2 days ago.  In June at clinic visit her blood pressure was 112/62.  He has had normal kidney function in the past.    On Lipitor 10 mg a day.  No muscle aches or history of liver problems.  May 2016 HDL 73 and LDL 76.    No history of chest pain or cardiac event and no diagnosis of vascular disease.    Hypothyroidism on Synthroid alternating with 50/75 mcg.  That is a stable dose.  Normal TSH in November of last year.    Osteopenia with bone density in 2014 with a spine score -1.4 and femur score 1.5.  Previous broken elbow and foot in 2014.  Does take calcium and vitamin D.  Has not been on additional osteoporosis treatment in the past.    She has chronic sinusitis and postnasal drip.  Managing with Flonase, Singulair and Anali pot.  Moderate cough in the morning.  Negative chest x-ray December 2016.  No sinus pain or ear pain or fever currently.    Bowel movements are normal.  Heartburn is well controlled with Pepcid twice daily.   "Colonoscopy June 2015 negative and 10 year follow-up was recommended.    July 2017 negative mammogram.    She has all her GYN organs, postmenopausal, no vaginal bleeding or discharge.  No urinary complaints.  No previous abnormal Paps or issues.    Mild to between contracture of the hand, no recent increase in symptoms.    No history of skin cancer.    She saw the eye doctor in September and was told she should have her cataract removed she has had some blurriness in her vision with that.    She has never smoked.    Lives independently.    Her anxiety and takes Ativan before dental work.    No headache complaints.  No mouth sores.  No swallowing difficulty.  No palpitations or fainting spells.  No chest pain.  No arthritis complaints.    No previous surgeries except for tonsillectomy in the 60s    PMHx:  No past medical history on file.  PSHx:  No past surgical history on file.  Immunizations:   Immunization History   Administered Date(s) Administered     Influenza high dose, seasonal 09/17/2015, 10/03/2016, 10/05/2017     Influenza, inj, historic 09/01/2012     Influenza, seasonal,quad inj 6-35 mos 10/18/2014     Influenza,seasonal quad, PF 10/16/2013     Pneumo Conj 13-V (2010&after) 08/03/2015     Pneumo Polysac 23-V 04/27/2012     Tdap 11/01/2013     ZOSTER 04/12/2012       ROS A comprehensive review of systems was performed and was otherwise negative    Medications, allergies, and problem list were reviewed and updated    Exam  /68  Pulse 66  Ht 5' 2.5\" (1.588 m)  Wt 115 lb (52.2 kg)  SpO2 98%  BMI 20.7 kg/m2  Thin, mildly anxious female.  Alert and oriented ×3 with normal mood and affect.  Pupils and irises equal and reactive.  Extraocular muscles intact.  No jaundice or conjunctivitis.  External ears nose exam is normal.  Some irritation from Q-tips in the right ear.  No cerumen.  Tympanic membranes normal.  Pharynx is moderate cobblestoning with non-exudative inflammation consistent with postnasal " drip.  Teeth in good condition.  No leukoplakia or other oral lesions.  No cervical or supraclavicular or axillary adenopathy.  No carotid bruits and no JVD.  No thyromegaly or nodularity.  Lungs clear to auscultation with good respiratory excursion.  Mild kyphosis.  Heart is regular without murmur rub or gallop.  Abdomen soft nontender nonobese and no hepatosplenomegaly and no pulsatile mass.  Skin exam is normal to inspection and palpation.  Feet in good condition.  +1 pedal pulses bilaterally no ankle edema.  A normal.  Mild osteoarthritic changes of the hands.    She declined a breast exam.  No further Paps or pelvics with age, lack of symptoms.    Assessment/Plan  1. Health care maintenance  I congratulated her on a regular walking, we discussed healthy diet.    Moderate cardiovascular risk factors, but they really are borderline diabetes and hypertension.  No new symptoms remains active.    She will consider having her cataract surgery later this year, she was told she should come in for preop 1 week before the cataract surgery.    Flu shot given today.    She will be due for colonoscopy, 10 year screening in 8 years, unclear if she will wish to do the screening at that time with her age.    Continue yearly mammograms in July.    2. Hypothyroidism  Clinically euthyroid  - levothyroxine (SYNTHROID, LEVOTHROID) 50 MCG tablet; Take 1 tablet (50 mcg total) by mouth every other day. Alternating with 75mcg  Dispense: 90 tablet; Refill: 1  - Thyroid Stimulating Hormone (TSH)    3. Essential hypertension  Blood pressure appears well controlled but she is taking her lisinopril 5 mg intermittently.  Does have some dizziness.    She will change her dose to 2.5 mg, attempt to take daily.  Follow blood pressure closely and follow-up in approximately 6 weeks to review blood pressure.  She does have increasing lightheaded dizziness or weakness, contact clinic for sooner evaluation.  - lisinopril (PRINIVIL,ZESTRIL) 2.5 MG  tablet; Take 1 tablet (2.5 mg total) by mouth daily.  Dispense: 90 tablet; Refill: 1    4. Osteopenia, unspecified location  Continue regular walking and calcium and vitamin D.  - DXA Bone Density Scan; Future    5. Postmenopausal    - DXA Bone Density Scan; Future    6. Hypercholesterolemia  Continue Lipitor at this time.  Toxicity risk discussed with patient.  - Lipid Cascade    7. Medication monitoring encounter    - Comprehensive Metabolic Panel  - HM2(CBC w/o Differential)    8. Hyperglycemia  She has had normal blood sugars.  Reviewed her blood sugars that she checks at home intermittently and they are , she rarely takes metformin.  She will discontinue metformin and follow-up in 6 weeks.  Continue healthy diet and exercise.  - Glycosylated Hemoglobin A1c    Reflux controlled with Pepcid.    No Follow-up on file.   Patient Instructions   Stop metformin.    Continue to walk on a regular basis.    Reduce lisinopril to 2.5 mg every day, or intermittently if you have low blood pressures.    All your blood pressure and blood sugars, and follow-up in 6 weeks to reevaluate that.  If you have a blood pressure cuff, bring it with you to compare to ours.    Bone density with DEXA scan before next visit.    Lab work today    Satya De La Cruz MD      Current Outpatient Prescriptions   Medication Sig Dispense Refill     atorvastatin (LIPITOR) 10 MG tablet Take 1 tablet (10 mg total) by mouth bedtime. 90 tablet 3     famotidine (PEPCID) 20 MG tablet TAKE 1 TABLET BY MOUTH 2 TIMES EVERY DAY  11     levothyroxine (SYNTHROID, LEVOTHROID) 50 MCG tablet Take 1 tablet (50 mcg total) by mouth every other day. Alternating with 75mcg 90 tablet 1     levothyroxine (SYNTHROID, LEVOTHROID) 75 MCG tablet Take 1 tablet every other day alternating with Levothyroxine 50 mcg 45 tablet 3     lisinopril (PRINIVIL,ZESTRIL) 2.5 MG tablet Take 1 tablet (2.5 mg total) by mouth daily. 90 tablet 1     montelukast (SINGULAIR) 10 mg tablet Take 1  tablet (10 mg total) by mouth bedtime. Must establish care with new provider before further refills will be given. 90 tablet 3     blood glucose test (ACCU-CHEK SMARTVIEW TEST STRIP) strips Use 1 each As Directed as needed. Dispense brand per patient's insurance at pharmacy discretion. 50 strip 3     lancets (ULTRA THIN LANCETS) 30 gauge Misc Test sugars 1-2 time per week 100 each 3     LORazepam (ATIVAN) 0.5 MG tablet Take 0.5-1 mg by mouth as needed for anxiety (60 minutes before dental work).       No current facility-administered medications for this visit.      Allergies   Allergen Reactions     Azithromycin      Clindamycin      Levofloxacin      Ears buzzing     Sulfa (Sulfonamide Antibiotics)      Social History   Substance Use Topics     Smoking status: Never Smoker     Smokeless tobacco: Never Used     Alcohol use None

## 2021-06-13 NOTE — PROGRESS NOTES
Clinic Care Coordination Contact  [facility]/Voicemail       Clinical Data: Care Coordinator Outreach  Outreach attempted x 2.  Left message on buster's voicemail with call back information and requested return call.  Plan: Care Coordinator will send disenrollment letter with care coordinator contact information via mail if no call back from daughter. Care Coordinator will do no further outreaches at this time.  Brandy Gorman [facility]  Clinic Care Coordinator  [phone]    Clinic Care Coordination Contact  [facility]/Voicemail       Clinical Data: Care Coordinator Outreach  Outreach attempted x 1.  Left message on Bibiana's voicemail with call back information and requested return call.    RN CC and CHW have left several messages for patient with no call backs.     Plan: . Care Coordinator will try to reach Bibiana in 3-5 business days.     Brandy Gorman [facility]  Clinic Care Coordinator  [phone]

## 2021-06-13 NOTE — PROGRESS NOTES
Clinic Care Coordination Contact  [facility]/Voicemail       Clinical Data: Care Coordinator Outreach  Outreach attempted x 2.  Left message on patient's voicemail with call back information and requested return call.  Plan:  Care Coordinator will try to reach patient again in one month. .    Provided patient/family with phone number for CCC for any needs or concerns.

## 2021-06-13 NOTE — PROGRESS NOTES
Clinic Care Coordination Contact  [facility]/Voicemail       Clinical Data: Care Coordinator Outreach  Outreach attempted x 2.  Left message on patient's voicemail with call back information and requested return call.  Plan: Care Coordinator will send unable to contact letter with care coordinator contact information via [employer]. Care Coordinator will try to reach patient again in 10 business days.  Next outreach due: 12/14/2020

## 2021-06-13 NOTE — PROGRESS NOTES
Clinic Care Coordination Contact      Assessment: No call back from patient or family.      Plan/Recommendations: Closed to care coordination at this time. Informed PCP.     Brandy Gorman [facility]  Clinic Care Coordinator  [phone]            Clinic Care Coordination Contact  [facility]       Clinical Data: Care Coordinator Outreach  Outreach attempted x 3.    Plan: Care Coordinator will send disenrollment letter with care coordinator contact information via [employer]. Care Coordinator will do no further outreaches at this time. Will close to care coordination if no call back in 10 days.   Brandy Gorman [facility]  Clinic Care Coordinator  [phone]

## 2021-06-14 NOTE — PROGRESS NOTES
[facility] Clinic Follow Up Note    Courtney Singh   67 y.o. female    Date of Visit: 12/4/2017    Chief Complaint   Patient presents with     Sore Throat     sinus pressure, ear pressure x 2-3 days. abdominal discomfort.     Subjective  Courtney is here for a exacerbation of her chronic sinusitis as well as follow-up on her blood pressure.    She has well-controlled diabetes with a hemoglobin A1c of 5.8% in October.  She was going to continue a low dose of metformin, could take a half a tablet or every other day.    She has chronic sinusitis with chronic cough.  She has been using a saline irrigation, Flonase and Singulair.  She does not use an antihistamine.  She has been treated with doxycycline in the past with good results.  It has been a number of years since her last sinus infection.  She has multiple drug allergies.    2 3 days ago she had increasing cough with right throat pain and drainage.  No fever.  Increased sinus pressure especially bilateral frontal and maxillary pressure.  No ear pain.  No shortness of breath or wheezing or chest symptoms.    Patient did see me November 16 for blood pressure and I had her take the lisinopril 5 mg every day.  She did do that and denies lightheaded dizzy spells her blood pressure is well controlled today.  No edema.    No chest pain or palpitations.    She still on her same dose of Synthroid, TSH was borderline low but stable and she continued on the same dose in November.    Non-smoker    No rash or diarrhea or abdominal pain    PMHx:  No past medical history on file.  PSHx:  No past surgical history on file.  Immunizations:   Immunization History   Administered Date(s) Administered     Influenza high dose, seasonal 09/17/2015, 10/03/2016, 10/05/2017     Influenza, inj, historic,unspecified 09/01/2012     Influenza, seasonal,quad inj 6-35 mos 10/18/2014     Influenza,seasonal quad, PF 10/16/2013     Pneumo Conj 13-V (2010&after) 08/03/2015     Pneumo  "Polysac 23-V 04/27/2012     Tdap 11/01/2013     ZOSTER 04/12/2012       ROS A comprehensive review of systems was performed and was otherwise negative    Medications, allergies, and problem list were reviewed and updated    Exam  /64  Pulse 72  Ht 5' 2.5\" (1.588 m)  Wt 116 lb (52.6 kg)  SpO2 98%  BMI 20.88 kg/m2  She does not appear acutely ill.  Tympanic membranes are normal.  No conjunctivitis.  She does have moderately severe nasal mucosal edema but I do not see any purulent drainage, she has dry cracked irritated nasal loss.  She has moderately severe postnasal drip pharyngitis, especially on the right.  It is not exudative pharyngitis.  No cervical adenopathy.  Lungs are clear to auscultation with good respiratory excursion.  Heart is regular no murmur.  No ankle edema.    Assessment/Plan  1. Acute recurrent sinusitis, unspecified location  Acute exacerbation of her chronic sinusitis with significant postnasal drip throat irritation.  Return to saline irrigation, cough drops and gargling.  Continue Singulair, restart Flonase.    Treat sinusitis with doxycycline, she states she has tolerated that in the past.  She was told to take with food.  Follow-up if symptoms are not improving over the next 1-2 weeks.  - doxycycline (ADOXA) 100 MG tablet; Take 1 tablet (100 mg total) by mouth 2 (two) times a day for 14 days.  Dispense: 28 tablet; Refill: 0    2. Essential hypertension  Well-controlled now.  Continue lisinopril 5 mg a day.    3. Hypothyroidism, unspecified type  Continue current Synthroid    4. Medication monitoring encounter  After lisinopril adjustment.  - Basic Metabolic Panel    She still is to schedule her cataract surgery early next year, I can see her for a preop for that otherwise her routine 3-4 month follow-up on diabetes and blood pressure.    History of osteoporosis, the DEXA report is still pending.    Diabetes type 2, on metformin.  Follow-up in 3-4 months    Return in about 3 months " (around 3/4/2018) for Recheck.   Patient Instructions   Restart Flonase 2 sprays each nostril once a day.  Continue the saline [place] pot irrigation.  Continue Singulair daily.    Doxycycline 100 mg twice a day for 2 weeks to treat a sinus infection.    I would recommend bacitracin or antibiotic ointment for the nose once or twice a day to reduce the dry, irritated cracking around the edge of the nose.    If your symptoms do not improve, you can request an ENT referral.    See me for preop evaluation 1-2 weeks before the cataract surgery, and/or routine spring follow-up for your blood pressure in 3-4 months.    Lab work today to check potassium and kidney labs after the change of your lisinopril to 5 mg daily.    Satya De La Cruz MD  Total time with patient over 25 minutes and over 50% coord care.  Time all face to face.      Current Outpatient Prescriptions   Medication Sig Dispense Refill     atorvastatin (LIPITOR) 10 MG tablet Take 1 tablet (10 mg total) by mouth at bedtime. 90 tablet 3     blood glucose test (ACCU-CHEK SMARTVIEW TEST STRIP) strips Use 1 each As Directed as needed. Dispense brand per patient's insurance at pharmacy discretion. 50 strip 3     famotidine (PEPCID) 20 MG tablet TAKE 1 TABLET BY MOUTH 2 TIMES EVERY DAY  11     fluticasone (FLONASE) 50 mcg/actuation nasal spray 2 sprays into each nostril daily. 48 g 3     lancets (ULTRA THIN LANCETS) 30 gauge Misc Test sugars 1-2 time per week 100 each 3     levothyroxine (SYNTHROID, LEVOTHROID) 50 MCG tablet Take 1 tablet (50 mcg total) by mouth every other day. Alternating with 75mcg 90 tablet 1     levothyroxine (SYNTHROID, LEVOTHROID) 75 MCG tablet Take 1 tablet every other day alternating with Levothyroxine 50 mcg 45 tablet 3     lisinopril (PRINIVIL,ZESTRIL) 5 MG tablet Take 1 tablet (5 mg total) by mouth daily. 30 tablet 0     LORazepam (ATIVAN) 0.5 MG tablet Take 0.5-1 mg by mouth as needed for anxiety (60 minutes before dental work).        metFORMIN (GLUCOPHAGE) 500 MG tablet Take 1 tablet (500 mg total) by mouth daily with breakfast. 90 tablet 3     montelukast (SINGULAIR) 10 mg tablet Take 1 tablet (10 mg total) by mouth bedtime. Must establish care with new provider before further refills will be given. 90 tablet 3     doxycycline (ADOXA) 100 MG tablet Take 1 tablet (100 mg total) by mouth 2 (two) times a day for 14 days. 28 tablet 0     No current facility-administered medications for this visit.      Allergies   Allergen Reactions     Azithromycin      Clindamycin      Levofloxacin      Ears buzzing     Sulfa (Sulfonamide Antibiotics)      Social History   Substance Use Topics     Smoking status: Never Smoker     Smokeless tobacco: Never Used     Alcohol use Not on file       Courtney

## 2021-06-14 NOTE — TELEPHONE ENCOUNTER
Refill request for medication: metformin  Last visit addressing this medication: 10/13/20  Follow up plan 6  Months with PCP (Ambreen Valle, DOUG)  Last refill on 10/15/2020, quantity #90   CSA completed Not applicable   checked Not applicable    Appointment: Not due     Lucina Padgett, [facility]

## 2021-06-14 NOTE — TELEPHONE ENCOUNTER
Refill Approved    Rx renewed per Medication Renewal Policy. Medication was last renewed on 10/14/2020.  Last office visit was 10/13/2020 with PCP.    Yin Rivera, [facility] Triage/Med Refill 1/5/2021     Requested Prescriptions   Pending Prescriptions Disp Refills     atorvastatin (LIPITOR) 20 MG tablet [Pharmacy Med Name: ATORVASTATIN 20 MG TABLET] 90 tablet 0     Sig: TAKE 1 TABLET BY MOUTH EVERY DAY       Statins Refill Protocol (Hmg CoA Reductase Inhibitors) Passed - 1/5/2021 12:44 AM        Passed - PCP or prescribing provider visit in past 12 months      Last office visit with prescriber/PCP: 10/13/2020 Ambreen Valle CNP OR same dept: 10/13/2020 Ambreen Valle CNP OR same specialty: 10/13/2020 Ambreen Valle CNP  Last physical: Visit date not found Last MTM visit: Visit date not found   Next visit within 3 mo: Visit date not found  Next physical within 3 mo: Visit date not found  Prescriber OR PCP: Ambreen Valle CNP  Last diagnosis associated with med order: 1. Pure hypercholesterolemia  - atorvastatin (LIPITOR) 20 MG tablet [Pharmacy Med Name: ATORVASTATIN 20 MG TABLET]; TAKE 1 TABLET BY MOUTH EVERY DAY  Dispense: 90 tablet; Refill: 0    If protocol passes may refill for 12 months if within 3 months of last provider visit (or a total of 15 months).

## 2021-06-14 NOTE — PROGRESS NOTES
[facility] Follow Up Note    Courtney Singh   67 y.o. female    Date of Visit: 11/16/2017    Chief Complaint   Patient presents with     Follow-up     BP follow up, sinus issues     Subjective  Courtney is here for follow-up of multiple medical problems.    She is taking her medication intermittently, 5 mg about twice a week of lisinopril for sleep blood pressures.  October 5 I had her change that to 2.5 mg of lisinopril every day.  Blood pressures at home of been generally good in the 115/ 9/59 range.  She denies lightheaded dizzy spells.  The one low blood pressure she had was after sitting for a long period of time and rechecking it.    Urine microalbumin normal June 2017.    No cough no increased edema.  No increased fatigue.  Today blood pressure was significantly higher and spike up to 160/60 when I checked it, likely some white coat hypertension.    He has a past history of diabetes type 2 that has been very well controlled with a hemoglobin A1c of 5.8% last month, she was only taking the metformin occasionally for now I stop them entirely.  Still getting some higher blood sugars up to 158, most blood sugars 88-120s.  Generally active.  Trying to stay with the diabetic diet.  Weight is up just 2 pounds.  She is very thin.    September 2017 ophthalmology check with negative retinopathy.  No neuropathy or foot issues.    Hypothyroidism alternating 50 with 75 mcg of Synthroid, and a stable dose.  She had a borderline low TSH of 0.25.  No palpitations or diarrhea or tremor.    She walks the mall, remains active.    She has chronic sinusitis, is taking Flonase and Singulair and [place] pot daily.  Negative chest x-ray December 2016.  Never smoked.  At some mild increasing bifrontal pressure feeling but not severe no purulent discharge or fever.    She did have her bone density last month but has not heard the results, results not on computer.    She has not talked to her eye doctor  "about possible cataract surgery as mentioned at her September evaluation.    No chest pain or palpitations.    PMHx:  No past medical history on file.  PSHx:  No past surgical history on file.  Immunizations:   Immunization History   Administered Date(s) Administered     Influenza high dose, seasonal 09/17/2015, 10/03/2016, 10/05/2017     Influenza, inj, historic,unspecified 09/01/2012     Influenza, seasonal,quad inj 6-35 mos 10/18/2014     Influenza,seasonal quad, PF 10/16/2013     Pneumo Conj 13-V (2010&after) 08/03/2015     Pneumo Polysac 23-V 04/27/2012     Tdap 11/01/2013     ZOSTER 04/12/2012       ROS A comprehensive review of systems was performed and was otherwise negative    Medications, allergies, and problem list were reviewed and updated    Exam  /72  Pulse 69  Ht 5' 2.5\" (1.588 m)  Wt 117 lb (53.1 kg)  SpO2 98%  BMI 21.06 kg/m2  Tympanic membranes clear.  Pharynx with moderately severe postnasal drip pharyngitis, no exudate or thrush.  No JVD.  Lungs are clear.  Heart is regular without murmur.  No ankle edema.    Blood pressure recheck was 160/60 for me.    Assessment/Plan  1. Essential hypertension  Labile hypertension with spiking systolic blood pressures.  Some borderline lower blood pressures been tolerating.    Because of high spiking blood pressure today and her diabetes history, would like to try to keep her blood pressure less than 130/70 most of the time.  I will have her attempt a higher dose of lisinopril at 5 mg daily.  She does develop lightheaded dizzy spells or worsening fatigue, will need to reevaluate dosage.  Follow-up in 3 weeks to reevaluate.  - lisinopril (PRINIVIL,ZESTRIL) 5 MG tablet; Take 1 tablet (5 mg total) by mouth daily.    2. Hypothyroid  Recheck TSH, adjust dose only if significant fluctuation  - levothyroxine (SYNTHROID, LEVOTHROID) 75 MCG tablet; Take 1 tablet every other day alternating with Levothyroxine 50 mcg  Dispense: 45 tablet; Refill: 3  - Thyroid " Stimulating Hormone (TSH)    3. Type 2 diabetes mellitus without complication, without long-term current use of insulin  Borderline blood sugars since stopping metformin entirely.  I did recommend restarting the daily metformin, or possibly a half tablet a day.  She did not want to cut the pills, she may end up taking it every other day, although I did not recommend that.  - metFORMIN (GLUCOPHAGE) 500 MG tablet; Take 1 tablet (500 mg total) by mouth daily with breakfast.  Dispense: 90 tablet; Refill: 3    Yearly eye exam in September.  Urine microalbumin due in June    4. Osteoporosis, unspecified osteoporosis type, unspecified pathological fracture presence  We will obtain DEXA results before next visit.    5. Medication monitoring encounter  After adjustment of lisinopril  - Basic Metabolic Panel    Chronic sinusitis with nasal congestion bifrontal headache.  Continue Flonase and singular [place] pot.  Call if worsening symptoms or fever consider bacterial sinusitis treatment, I also offered her referral to ENT.    She was told to see me for preop prior to any cataract surgery this winter.    Return in about 3 weeks (around 12/7/2017) for Recheck.   Patient Instructions   Increase lisinopril to 5 mg a day.  Follow-up in approximately 3 weeks to recheck blood pressure.    Lab work today to check kidney labs and potassium and recheck TSH.    Restart metformin 500 mg daily with breakfast on most days.  Continue to walk on a regular basis through the winter to help with diabetes.    We will look into getting you the bone density results.    You will be seeing me for a preop exam 1-2 weeks before cataract surgery, if you do that this winter.    Satya De La Cruz MD      Current Outpatient Prescriptions   Medication Sig Dispense Refill     atorvastatin (LIPITOR) 10 MG tablet Take 1 tablet (10 mg total) by mouth bedtime. 90 tablet 3     blood glucose test (ACCU-CHEK SMARTVIEW TEST STRIP) strips Use 1 each As Directed as  needed. Dispense brand per patient's insurance at pharmacy discretion. 50 strip 3     famotidine (PEPCID) 20 MG tablet TAKE 1 TABLET BY MOUTH 2 TIMES EVERY DAY  11     lancets (ULTRA THIN LANCETS) 30 gauge Misc Test sugars 1-2 time per week 100 each 3     levothyroxine (SYNTHROID, LEVOTHROID) 50 MCG tablet Take 1 tablet (50 mcg total) by mouth every other day. Alternating with 75mcg 90 tablet 1     levothyroxine (SYNTHROID, LEVOTHROID) 75 MCG tablet Take 1 tablet every other day alternating with Levothyroxine 50 mcg 45 tablet 3     lisinopril (PRINIVIL,ZESTRIL) 5 MG tablet Take 1 tablet (5 mg total) by mouth daily.       montelukast (SINGULAIR) 10 mg tablet Take 1 tablet (10 mg total) by mouth bedtime. Must establish care with new provider before further refills will be given. 90 tablet 3     LORazepam (ATIVAN) 0.5 MG tablet Take 0.5-1 mg by mouth as needed for anxiety (60 minutes before dental work).       metFORMIN (GLUCOPHAGE) 500 MG tablet Take 1 tablet (500 mg total) by mouth daily with breakfast. 90 tablet 3     No current facility-administered medications for this visit.      Allergies   Allergen Reactions     Azithromycin      Clindamycin      Levofloxacin      Ears buzzing     Sulfa (Sulfonamide Antibiotics)      Social History   Substance Use Topics     Smoking status: Never Smoker     Smokeless tobacco: Never Used     Alcohol use None

## 2021-06-15 NOTE — PROGRESS NOTES
[facility] Follow Up Note    Courtney Singh   67 y.o. female    Date of Visit: 1/8/2018    Chief Complaint   Patient presents with     Pre-op Exam     Lt cataract on 1/19, Rt on 1/30, Dr Wheeler, @ [facility]     Subjective  Courtney is here for a preop prior to bilateral cataract surgery on January 19 and January 30.  Outpatient cataract surgery.    Patient has had greater than 6 months of progressive decreased vision.  Trouble reading.  Affecting driving at night, does not drive at night at this time.  No eye pain.  No sudden flashing lights or floaters.  No history of glaucoma.    Patient is also following up for her chronic medical issues of diabetes is well controlled, hypothyroidism, hypertension and hypercholesterolemia and chronic sinusitis.    She was treated with doxycycline last month for chronic sinusitis exacerbation.  That is better now.  No significant cough, just mild postnasal drip and throat irritation.  No ear pain.  She is on Singulair and Flonase.  No shortness of breath or fever.    Her diabetes is well controlled with metformin with hemoglobin A1c in October of 5.8%.  No foot sores or neuropathy.    Hypothyroidism with borderline low TSH in November that was roughly stable.  She continued on her same dose of 50 alternating with 75 mcg of Synthroid.    Her heartburn is well controlled on Pepcid in the morning.  No swallowing difficulty.  No history of GI bleeding or epigastric pain.    She has never smoked.    She is active with regular walking.  Good exercise tolerance.  No chest pain or chest pressure.  No history of palpitations or syncope.    No current acute illness.  No significant cough or fever.  No urinary symptoms.  No leg edema.  No recent falls.    Her hypertension is been well-controlled with blood pressures in the 120s over 60s on lisinopril 5 mg a day.  No orthostasis.    Tolerating Lipitor 10 mg without right upper quadrant pain or new myalgias.    No anxiety  "issues, she does not use lorazepam at this time.    October 2017 normal hemogram/hemoglobin.    December 9, 2012 normal EKG except for borderline bradycardia.    December 4 potassium 4.3.    PMHx:  No past medical history on file.  PSHx:  No past surgical history on file.  Immunizations:   Immunization History   Administered Date(s) Administered     Influenza high dose, seasonal 09/17/2015, 10/03/2016, 10/05/2017     Influenza, inj, historic,unspecified 09/01/2012     Influenza, seasonal,quad inj 6-35 mos 10/18/2014     Influenza,seasonal quad, PF 10/16/2013     Pneumo Conj 13-V (2010&after) 08/03/2015     Pneumo Polysac 23-V 04/27/2012     Tdap 11/01/2013     ZOSTER 04/12/2012       ROS A comprehensive review of systems was performed and was otherwise negative    Medications, allergies, and problem list were reviewed and updated    Exam  /70  Pulse 67  Ht 5' 2.5\" (1.588 m)  Wt 114 lb (51.7 kg)  SpO2 98%  BMI 20.52 kg/m2  Appears well.  Alert and oriented ×3.  Normal mood and affect.  Pupils and irises equal and reactive.  Extraocular muscles intact.  No jaundice.  No conjunctivitis and periorbital tissues normal.  Pharynx shows some mild diffuse nonexudative pharyngitis.  No other oral lesions.  Teeth in good condition.  No JVD.  No carotid bruits.  Lungs clear to auscultation with normal respiratory excursion.  Heart is regular without murmur.  Abdomen is nonobese nontender no hepatosplenomegaly.  No ankle edema.    Assessment/Plan  1. Preop examination  Patient is clinically stable in good condition with adequate exertional ability.  No active infection symptoms or cardiac symptoms.    Patient can proceed as planned with outpatient cataract surgery.    I stressed the importance of following up with ophthalmology as directed and importance of using the eyedrops as directed.    I did discuss some risks of surgery including infection, lens slipping, vision problems, and other risks.  She accepts these " risks and wishes to proceed.    2. Cataract of both eyes, unspecified cataract type      3. Type II diabetes mellitus  Well-controlled with metformin daily  - blood glucose test (ACCU-CHEK SMARTVIEW TEST STRIP) strips; Use 1 each As Directed as needed. Dispense brand per patient's insurance at pharmacy discretion.  Dispense: 50 strip; Refill: 6  - Glycosylated Hemoglobin A1c    4. Essential hypertension  Well-controlled on lisinopril daily, she will take the lisinopril the morning of surgery with a sip of water as usual.    Hypercholesterolemia, takes atorvastatin at night.    5. Chronic Reflux Esophagitis  Controlled with Pepcid in the morning    6. Medication monitoring encounter    - Comprehensive Metabolic Panel    7. Osteoporosis, unspecified osteoporosis type, unspecified pathological fracture presence  Our staff will investigate what happened to her DEXA scan that was set up last fall, no results, unclear if she had completed it.    8. Hypothyroid  Continue usual dose of levothyroxine at this time.  Take morning of procedure as planned.  - Thyroid Stimulating Hormone (TSH)    History of chronic sinusitis, currently well controlled with Singulair and Flonase.  Does use saline nasal irrigation.    Return in about 4 months (around 5/8/2018) for Recheck.   Patient Instructions   Continue usual medications.  Do take your levothyroxine and famotidine with a sip of water on the morning of the procedure.    Follow-up with your ophthalmologist as directed.    Routine blood work today.  Routine follow-up appointment with me in 4 months.    Satya De La Cruz MD  Total time with patient over 25 minutes and over 50% coord care.  Time all face to face.      Current Outpatient Prescriptions   Medication Sig Dispense Refill     atorvastatin (LIPITOR) 10 MG tablet Take 1 tablet (10 mg total) by mouth at bedtime. 90 tablet 3     blood glucose test (ACCU-CHEK SMARTVIEW TEST STRIP) strips Use 1 each As Directed as needed. Dispense  brand per patient's insurance at pharmacy discretion. 50 strip 6     famotidine (PEPCID) 20 MG tablet TAKE 1 TABLET BY MOUTH 2 TIMES EVERY DAY  11     fluticasone (FLONASE) 50 mcg/actuation nasal spray 2 sprays into each nostril daily. 48 g 3     lancets (ULTRA THIN LANCETS) 30 gauge Misc Test sugars 1-2 time per week 100 each 3     levothyroxine (SYNTHROID, LEVOTHROID) 50 MCG tablet Take 1 tablet (50 mcg total) by mouth every other day. Alternating with 75mcg 90 tablet 1     levothyroxine (SYNTHROID, LEVOTHROID) 75 MCG tablet Take 1 tablet every other day alternating with Levothyroxine 50 mcg 45 tablet 3     lisinopril (PRINIVIL,ZESTRIL) 5 MG tablet Take 1 tablet (5 mg total) by mouth daily. 30 tablet 0     LORazepam (ATIVAN) 0.5 MG tablet Take 0.5-1 mg by mouth as needed for anxiety (60 minutes before dental work).       metFORMIN (GLUCOPHAGE) 500 MG tablet Take 1 tablet (500 mg total) by mouth daily with breakfast. 90 tablet 3     montelukast (SINGULAIR) 10 mg tablet Take 1 tablet (10 mg total) by mouth bedtime. Must establish care with new provider before further refills will be given. 90 tablet 3     No current facility-administered medications for this visit.      Allergies   Allergen Reactions     Azithromycin      Clindamycin      Levofloxacin      Ears buzzing     Sulfa (Sulfonamide Antibiotics)      Social History   Substance Use Topics     Smoking status: Never Smoker     Smokeless tobacco: Never Used     Alcohol use None

## 2021-06-16 PROBLEM — D37.8 NEOPLASM OF UNCERTAIN BEHAVIOR OF STOMACH, INTESTINES, AND RECTUM: Status: ACTIVE | Noted: 2021-04-13

## 2021-06-16 PROBLEM — E78.00 HYPERCHOLESTEROLEMIA: Status: ACTIVE | Noted: 2018-06-05

## 2021-06-16 PROBLEM — E03.9 HYPOTHYROIDISM, UNSPECIFIED TYPE: Status: ACTIVE | Noted: 2018-06-05

## 2021-06-16 PROBLEM — T17.500A MUCUS PLUGGING OF BRONCHI: Status: ACTIVE | Noted: 2020-09-16

## 2021-06-16 PROBLEM — Z51.81 MEDICATION MONITORING ENCOUNTER: Status: ACTIVE | Noted: 2017-11-16

## 2021-06-16 PROBLEM — K21.9 GASTROESOPHAGEAL REFLUX DISEASE, UNSPECIFIED WHETHER ESOPHAGITIS PRESENT: Status: ACTIVE | Noted: 2020-10-13

## 2021-06-16 PROBLEM — D37.5 NEOPLASM OF UNCERTAIN BEHAVIOR OF STOMACH, INTESTINES, AND RECTUM: Status: ACTIVE | Noted: 2021-04-13

## 2021-06-16 PROBLEM — D37.1 NEOPLASM OF UNCERTAIN BEHAVIOR OF STOMACH, INTESTINES, AND RECTUM: Status: ACTIVE | Noted: 2021-04-13

## 2021-06-16 NOTE — PROGRESS NOTES
Assessment and Plan:     1. Major neurocognitive disorder due to Alzheimer's disease, possible (H)  Patient's daughter feels as though her memory has worsened.  She is now forgetting to eat and they have seen some weight loss with this.  This is likely due to a caloric deficit.  Will refer to our care management team to see if we have any resources to assist with this.  Will also refer to neurology.  Discussed completing a full work-up for unintentional weight loss, but patient and her daughter declined as they feel as though it is because she is not eating well at home.  They do have protein drinks available at home.  - [facility]  - Ambulatory referral to Care Management (Primary Care)    2. Type 2 diabetes mellitus without complication, without long-term current use of insulin (H)  We will check A1c notify patient of results.  She continues Metformin.  - Glycosylated Hemoglobin A1c  - blood glucose meter (GLUCOMETER); Test one time daily. DX: E11.9 Dispense brand per patient's insurance at pharmacy discretion.  Dispense: 1 each; Refill: 0    3. Hypothyroidism, unspecified type  This is controlled with levothyroxine.    4. Hypercholesterolemia  Goal LDL is less than 100.  We will check LDL.  - LDL Cholesterol, Direct    5. Essential hypertension  This is controlled with lisinopril.      Subjective:     Courtney is a 71 y.o. female presenting to the clinic with her daughter, Bibiana, for multiple concerns.  She has multiple comorbidities including anxiety, hypertension, GERD, hypercholesterolemia, hypothyroidism, Alzheimer's disease, type 2 diabetes.  Patient's daughter is concerned she is not eating enough.  She has lost 5 pounds since September.  Patient currently lives with her  who is her primary caregiver.  He is medical power of .  Bibiana is concerned that her father may be neglecting the patient.  Patient does not remember to eat and  he does not prepare meals.  They currently live in a house with multiple stairs.  Bibiana is concerned that the patient may hurt herself.  Patient has not seen neurology for multiple years.  She is taking Aricept.  Bibiana feels as though her memory is worsening.  She is does not check her blood sugars.  They do have an alarm system set to remind her to take her medications, but they are unsure if she is truly compliant.  She is taking Metformin  mg daily for diabetes.  She has hypothyroidism and takes levothyroxine 50 mcg daily.  Last TSH was 1.67 on 8/10/2020.  She takes lisinopril for hypertension which is well controlled.  She is taking atorvastatin 20 mg for hyperlipidemia.  Patient is not fasting today.  Last LDL was 182 in 10/13/2020.    Review of Systems: A complete 14 point review of systems was obtained and is negative or as stated in the history of present illness.    Social History     Socioeconomic History     Marital status:      Spouse name: Not on file     Number of children: 3     Years of education: Not on file     Highest education level: Not on file   Occupational History     Not on file   Social Needs     Financial resource strain: Not on file     Food insecurity     Worry: Never true     Inability: Never true     Transportation needs     Medical: No     Non-medical: No   Tobacco Use     Smoking status: Never Smoker     Smokeless tobacco: Never Used   Substance and Sexual Activity     Alcohol use: Not on file     Drug use: Not on file     Sexual activity: Not on file   Lifestyle     Physical activity     Days per week: 5 days     Minutes per session: 30 min     Stress: Not on file   Relationships     Social connections     Talks on phone: Twice a week     Gets together: Not on file     Attends Sabianist service: Not on file     Active member of club or organization: Not on file     Attends meetings of clubs or organizations: Not on file     Relationship status:      Intimate  partner violence     Fear of current or ex partner: Not on file     Emotionally abused: Not on file     Physically abused: Not on file     Forced sexual activity: Not on file   Other Topics Concern     Not on file   Social History Narrative     Not on file       Active Ambulatory Problems     Diagnosis Date Noted     Pure hypercholesterolemia      Anxiety      Type 2 diabetes mellitus without complication (H)      Osteoporosis      Essential hypertension 05/16/2016     Medication monitoring encounter 11/16/2017     Hypothyroidism, unspecified type 06/05/2018     Hypercholesterolemia 06/05/2018     Major neurocognitive disorder due to Alzheimer's disease, possible (H) 08/10/2020     Mucus plugging of bronchi 09/16/2020     Gastroesophageal reflux disease, unspecified whether esophagitis present 10/13/2020     Personal history of exposure to potentially hazardous body fluids, presenting hazards to health 12/12/2009     Neoplasm of uncertain behavior of stomach, intestines, 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    Objective:     /58 (Patient Site: Right Arm, Patient Position: Sitting, Cuff Size: Adult Regular)   Pulse 64   Wt 103 lb 9.6 oz (47 kg)   SpO2 97%   BMI 18.35 kg/m      Patient is alert, in no obvious distress. She does not remember what she had to eat today.   Skin: Warm, dry.  No lesions or rashes.  Skin turgor rapid return.   HEENT:  Head normocephalic, atraumatic.  Eyes normal.. Ears normal.  Nose patent, mucosa pink.  Oropharynx mucosa pink.  No lesions or tonsillar  enlargement.   Neck: Supple, no lymphadenopathy.  No thyromegaly.  Lungs:  Clear to auscultation. Respirations even and unlabored.  No wheezing or rales noted.   Heart:  Regular rate and rhythm.  No murmurs, S3, S4, gallops, or rubs.    Abdomen: Soft, nontender.  No organomegaly. Bowel sounds normoactive. No guarding or masses noted.   Musculoskeletal:  No edema is present in bilateral lower extremities.

## 2021-06-16 NOTE — PROGRESS NOTES
Clinic Care Coordination Contact  Community Health Worker Initial Outreach    CHW Initial Information Gathering:  Referral Source: PCP  Preferred Hospital: [facility]  [phone]  Current living arrangement:: I live in a private home with spouse  Type of residence:: Private home - stairs  Informal Support system:: Children, Spouse  No PCP office visit in Past Year: No  Transportation means:: Regular car  CHW Additional Questions  MyChart active?: Yes  Patient sent Social Determinants of Health questionnaire?: No    Patient accepts CC: Yes. Patient scheduled for assessment with CCC MARY on 4/19/21 at 3 pm. Patient noted desire to discuss multiple concerns, speaking with daughter Bibiana regarding patient getting in home services and memory issues concerns..

## 2021-06-16 NOTE — PROGRESS NOTES
Clinic Care Coordination Contact    Situation: Patient chart reviewed by care coordinator.    Background: Called daughter Bibiana for care coordination assessment.     Assessment: Patient is now staying with Bibiana. She left her home last week as she did not want to stay with her  any longer due to his needs and behavior.  Patient called her sister who was able to find her and bring her to Bibiana's home. Bibiana does not have a bedroom for her mom and is looking for help with in home services and assisted living.  Discussed how to find assisted living and discussed [facility], [facility] and [facility] as well as senior housing guide book.  Bibiana is unsure of their finances and suspects that her dad will not disclose so she is unsure if mom would qualify for waiver. Took phone number for [facility] and she will call to discuss. Bibiana is reluctant to have mom go back home as she knows it is not a good situation for her.  They may look at having her move back home and hire some in home help for them both.  Bibiana would like her dad to also get help but he is refusing in home services and does not want to see his doctor.  Encouraged Bibiana to call [facility] [employer] if dad is not safe on his own and is refusing care.  She appreciated the information and will call if she needs help in the future. Did not want to enroll in care coordination at this time.         Plan/Recommendations: No further outreach by care coordination.     Brandy Gorman, [facility]  Clinic Care Coordinator  [phone]

## 2021-06-16 NOTE — PROGRESS NOTES
[facility] Clinic Follow Up Note    Courtney Singh   68 y.o. female    Date of Visit: 2/21/2018    Chief Complaint   Patient presents with     Pre-op Exam     Lt cataract surg on 2/26, Dr Wheeler @ [facility]  Courtney is here for repeat preop for bilateral cataract surgery.  Patient saw me on January 8, 2018 for preop for the surgeries, but developed a sinus infection and had to cancel the surgery.  She was treated with amoxicillin on January 23 for 10 days.  She continues on Singulair and Flonase for her chronic sinusitis.    She does not have any significant cough or sinus pain and no fevers or ear pain now.    No shortness of breath or history of asthma.    Left cataract surgery February 26 and right cataract surgery March 12 plan.    Greater than 6 months of progressive decreased vision.  Trouble reading and blurry vision.  Worse at night and does not drive at night because of this.  No eye pain.  No sudden flashing lights or floaters.  No history of glaucoma.    She does have diabetes type 2 no history of retinopathy.  That is well-controlled with blood sugars around 120.  Hemoglobin A1c in January was a hemoglobin A 1%.  No neuropathy.    Hypothyroidism, TSH was low last month and I had her lower her Synthroid from 75 alternating with 50 mcg, down to just 50 mcg every day.  No new symptoms after that change.    Heartburn is controlled on Pepcid.  No swallowing problems.  No history of GI bleeding or ulcer.    She has never smoked.    No history of sleep apnea.  Her graph hypertension is been well-controlled without lightheaded dizzy spells on stable dose of lisinopril.    Continues on Lipitor without generalized myalgias right upper quadrant pain.    December 2012 EKG was normal.  No history of arrhythmia or syncope.  No palpitations.    Osteopenia with bone density October 2017 with a spine score of -1.1 and femur score of -2.2, patient had received the results of the, plan for  "repeat DEXA in 2 years    PMHx:  No past medical history on file.  PSHx:  No past surgical history on file.  Immunizations:   Immunization History   Administered Date(s) Administered     Influenza high dose, seasonal 09/17/2015, 10/03/2016, 10/05/2017     Influenza, inj, historic,unspecified 09/01/2012     Influenza, seasonal,quad inj 6-35 mos 10/18/2014     Influenza,seasonal quad, PF 10/16/2013     Pneumo Conj 13-V (2010&after) 08/03/2015     Pneumo Polysac 23-V 04/27/2012     Tdap 11/01/2013     ZOSTER 04/12/2012       ROS A comprehensive review of systems was performed and was otherwise negative    Medications, allergies, and problem list were reviewed and updated    Exam  /62  Pulse 72  Ht 5' 2.5\" (1.588 m)  Wt 115 lb (52.2 kg)  BMI 20.7 kg/m2  Alert and oriented ×3.  Thin patient.  Appears well.  Normal mood and affect.  Pupils and irises are equal and reactive.  Extraocular muscles intact.  Periorbital tissues normal.  No conjunctivitis.  Pharynx is normal.  Teeth in good condition.  No cervical or supra clavicular adenopathy.  No JVD and no carotid bruits.  Lungs clear to auscultation with normal respiratory excursion.  Heart is regular without murmur.  Abdomen is nontender no hepatosplenomegaly.  No ankle edema.    Assessment/Plan  1. Preop examination  Patient clinically stable to proceed as planned with cataract surgery.  Patient states she understands risks has had this procedure explained, and wishes to proceed as planned.    Patient is medically stable to proceed with outpatient cataract surgery.    If she does develop recurrent significant cough or sinus infection, she may need to reschedule again.    Importance of taking eyedrops and following up with ophthalmology stress.    2. Cataract of both eyes, unspecified cataract type  As above    3. Hypothyroidism, unspecified type  She will take Synthroid on the morning of the procedure.  Recheck TSH today after dose adjustment last month  - " levothyroxine (SYNTHROID, LEVOTHROID) 50 MCG tablet; Take 1 tablet (50 mcg total) by mouth Daily at 6:00 am.  Dispense: 90 tablet; Refill: 1  - Thyroid Stimulating Hormone (TSH)    4. Type 2 diabetes mellitus without complication, without long-term current use of insulin  Well-controlled on metformin.    5. Essential hypertension  Well-controlled on lisinopril.    On Lipitor for hypercholesterolemia.    6. Medication monitoring encounter    - Basic Metabolic Panel    History of chronic recurrent sinusitis.  Continue Flonase and Singulair.  She does use saline irrigation.    Heartburn controlled on Pepcid, she can take that in the morning of the procedure.    Osteopenia, repeat DEXA in 2 years.  Continue regular weightbearing exercise.  Calcium vitamin D supplement recommended.    Return in about 3 months (around 5/21/2018) for Recheck.  Routine recheck  Patient Instructions   Take lisinopril, levothyroxine, famotidine with a sip of water on the morning of the procedure.    You can skip metformin on the morning of the procedure.    If you do develop a significant cough or recurrent sinus infection, you may need to reschedule to cataract surgery again.    Lab work today recheck thyroid test and potassium.    Follow-up with me for routine follow-up in 3 months.    Satya De La Cruz MD  Total time with patient over 25 minutes and over 50% coord care.  Time all face to face.      Current Outpatient Prescriptions   Medication Sig Dispense Refill     atorvastatin (LIPITOR) 10 MG tablet Take 1 tablet (10 mg total) by mouth at bedtime. 90 tablet 3     blood glucose test (ACCU-CHEK SMARTVIEW TEST STRIP) strips Use 1 each As Directed as needed. Dispense brand per patient's insurance at pharmacy discretion. 50 strip 6     famotidine (PEPCID) 20 MG tablet TAKE 1 TABLET BY MOUTH 2 TIMES EVERY DAY  11     fluticasone (FLONASE) 50 mcg/actuation nasal spray 2 sprays into each nostril daily. 48 g 3     lancets (ULTRA THIN LANCETS) 30  gauge Misc Test sugars 1-2 time per week 100 each 3     levothyroxine (SYNTHROID, LEVOTHROID) 50 MCG tablet Take 1 tablet (50 mcg total) by mouth Daily at 6:00 am. 90 tablet 1     lisinopril (PRINIVIL,ZESTRIL) 5 MG tablet Take 1 tablet (5 mg total) by mouth daily. 30 tablet 0     LORazepam (ATIVAN) 0.5 MG tablet Take 0.5-1 mg by mouth as needed for anxiety (60 minutes before dental work).       metFORMIN (GLUCOPHAGE) 500 MG tablet Take 1 tablet (500 mg total) by mouth daily with breakfast. 90 tablet 3     No current facility-administered medications for this visit.      Allergies   Allergen Reactions     Azithromycin      Clindamycin      Levofloxacin      Ears buzzing     Sulfa (Sulfonamide Antibiotics)      Social History   Substance Use Topics     Smoking status: Never Smoker     Smokeless tobacco: Never Used     Alcohol use None

## 2021-06-16 NOTE — PROGRESS NOTES
Clinic Care Coordination Contact  Care Team Conversations   Call from daughter Bibiana who had worked with care coordination services in 2020.  Patient's  was in hospital and is not able to care for patient as well due to own limitations. He has home care ordered and they are coming out tomorrow.  Bibiana would like to get more help and feels that her dad is reluctant to get more help.    Discussed options and Bibiana will be at intake for home care to discuss her concerns and to see if they can order social work visit.      Plan- she will see if they need additional support after home care meeting and will contact MARY DAY if they do.    Brandy Gorman, hospitals  Clinic Care Coordinator  [phone]

## 2021-06-17 ENCOUNTER — OFFICE VISIT - HEALTHEAST (OUTPATIENT)
Dept: FAMILY MEDICINE | Facility: CLINIC | Age: 71
End: 2021-06-17

## 2021-06-17 DIAGNOSIS — E11.9 TYPE 2 DIABETES MELLITUS WITHOUT COMPLICATION, WITHOUT LONG-TERM CURRENT USE OF INSULIN (H): ICD-10-CM

## 2021-06-17 DIAGNOSIS — Z02.89 ENCOUNTER FOR COMPLETION OF FORM WITH PATIENT: ICD-10-CM

## 2021-06-17 DIAGNOSIS — Z00.00 ROUTINE GENERAL MEDICAL EXAMINATION AT A HEALTH CARE FACILITY: ICD-10-CM

## 2021-06-17 DIAGNOSIS — E03.9 HYPOTHYROIDISM, UNSPECIFIED TYPE: ICD-10-CM

## 2021-06-17 DIAGNOSIS — Z23 ENCOUNTER FOR IMMUNIZATION: ICD-10-CM

## 2021-06-17 DIAGNOSIS — I10 ESSENTIAL HYPERTENSION: ICD-10-CM

## 2021-06-17 DIAGNOSIS — E78.00 HYPERCHOLESTEROLEMIA: ICD-10-CM

## 2021-06-17 DIAGNOSIS — Z78.0 POSTMENOPAUSAL STATUS: ICD-10-CM

## 2021-06-17 DIAGNOSIS — F03.90 MAJOR NEUROCOGNITIVE DISORDER DUE TO ALZHEIMER'S DISEASE, POSSIBLE: ICD-10-CM

## 2021-06-17 LAB
ANION GAP SERPL CALCULATED.3IONS-SCNC: 12 MMOL/L (ref 5–18)
BUN SERPL-MCNC: 15 MG/DL (ref 8–28)
CALCIUM SERPL-MCNC: 9.7 MG/DL (ref 8.5–10.5)
CHLORIDE BLD-SCNC: 95 MMOL/L (ref 98–107)
CHOLEST SERPL-MCNC: 155 MG/DL
CO2 SERPL-SCNC: 26 MMOL/L (ref 22–31)
CREAT SERPL-MCNC: 0.79 MG/DL (ref 0.6–1.1)
CREAT UR-MCNC: 32.7 MG/DL
FASTING STATUS PATIENT QL REPORTED: YES
GFR SERPL CREATININE-BSD FRML MDRD: >60 ML/MIN/1.73M2
GLUCOSE BLD-MCNC: 95 MG/DL (ref 70–125)
HDLC SERPL-MCNC: 79 MG/DL
LDLC SERPL CALC-MCNC: 67 MG/DL
MICROALBUMIN UR-MCNC: 0.58 MG/DL (ref 0–1.99)
MICROALBUMIN/CREAT UR: 17.7 MG/G
POTASSIUM BLD-SCNC: 4.6 MMOL/L (ref 3.5–5)
SODIUM SERPL-SCNC: 133 MMOL/L (ref 136–145)
T4 FREE SERPL-MCNC: 0.9 NG/DL (ref 0.7–1.8)
TRIGL SERPL-MCNC: 43 MG/DL
TSH SERPL DL<=0.005 MIU/L-ACNC: 12.23 UIU/ML (ref 0.3–5)

## 2021-06-17 ASSESSMENT — MIFFLIN-ST. JEOR: SCORE: 934.53

## 2021-06-17 NOTE — TELEPHONE ENCOUNTER
Refill Approved    Rx renewed per Medication Renewal Policy. Medication was last renewed on 5/22/20.    Daryl Lopez, [facility] Connection Triage/Med Refill 5/13/2021     Requested Prescriptions   Pending Prescriptions Disp Refills     donepeziL (ARICEPT) 5 MG tablet [Pharmacy Med Name: DONEPEZIL HCL 5 MG TABLET] 90 tablet 3     Sig: TAKE 1 TABLET BY MOUTH EVERYDAY AT BEDTIME       Cholinesterase Inhibitor/Anti-Alzheimer Agent Refill Protocol Passed - 5/12/2021 11:05 AM        Passed - Visit with PCP or prescribing provider visit in last 6 months or next 3 months     Last office visit with prescriber/PCP: Visit date not found OR same dept: Visit date not found OR same specialty: 1/29/2020 Juan Francisco Kaiser CNP Last physical: Visit date not found Last MTM visit: Visit date not found     Next appt within 3 mo: Visit date not found  Next physical within 3 mo: Visit date not found  Prescriber OR PCP: Satya De La Cruz MD  Last diagnosis associated with med order: 1. Memory loss  - donepeziL (ARICEPT) 5 MG tablet [Pharmacy Med Name: DONEPEZIL HCL 5 MG TABLET]; TAKE 1 TABLET BY MOUTH EVERYDAY AT BEDTIME  Dispense: 90 tablet; Refill: 3    If protocol passes may refill for 6 months if within 3 months of last provider visit (or a total of 9 months).

## 2021-06-18 NOTE — PROGRESS NOTES
[facility] Clinic Follow Up Note    Courtney Singh   68 y.o. female    Date of Visit: 6/5/2018    Chief Complaint   Patient presents with     Follow-up     DM follow up     Subjective  Courtney is here for follow-up of multiple medical problems.    She had her cataract surgery in February and March of this year, that went well.    She usually sees her eye doctor for routine diabetic checkup in September.  No history of retinopathy.    Her diabetes is very well controlled with Glucophage  mg once a day in the morning.  Blood sugars are ranging between 80 and 150.  No polyuria or polydipsia.  Hemoglobin A1c in January was 5.9%.  No neuropathy or foot sores.  No leg claudication.    She is active with walking 3 times a week or more, active walking in the mall in the winter.  Does yoga 2 times a week.  Good exercise tolerance.  No chest pain or increasing shortness of breath.    No lightheaded dizzy spells.  She has not checked her blood pressure recently.  In February blood pressure is 132/62.  Back in November of last year and had her increase the lisinopril to 5 mg a day, which is tolerated well.    No lower extremity edema.  Her graph she has chronic sinusitis, currently better.  Was severe in February.  Uses the [place] pot, Singulair and Flonase on a daily basis.  Her graph no wheezing or shortness of breath.  Last chest x-ray in 2016 was negative.    His never smoked.    Reflux controlled with Pepcid.    Hypothyroidism with low TSH in January and had her Synthroid dose reduced to 50 mcg a day.  Normal TSH in February.  She denies any missed doses.  No palpitations or new tremor.    Osteopenia October 2017 with DEXA scan spine score -1.1 and femurs were negative    No abdominal pain complaints.  No falls.    PMHx:  No past medical history on file.  PSHx:  No past surgical history on file.  Immunizations:   Immunization History   Administered Date(s) Administered     Influenza high dose,  "seasonal 09/17/2015, 10/03/2016, 10/05/2017     Influenza, inj, historic,unspecified 09/01/2012     Influenza, seasonal,quad inj 6-35 mos 10/18/2014     Influenza,seasonal quad, PF 10/16/2013     Pneumo Conj 13-V (2010&after) 08/03/2015     Pneumo Polysac 23-V 04/27/2012     Tdap 11/01/2013     ZOSTER, LIVE 04/12/2012       ROS A comprehensive review of systems was performed and was otherwise negative    Medications, allergies, and problem list were reviewed and updated    Exam  /78  Pulse 64  Ht 5' 2.5\" (1.588 m)  Wt 116 lb (52.6 kg)  SpO2 98%  BMI 20.88 kg/m2  Alert and oriented ×3 with normal mood and affect.  Pupils and irises equal and reactive.  No jaundice.  Moderate postnasal drip pharyngitis without exudate and no thrush.  Teeth in adequate condition.  No cervical or supra clavicular adenopathy.  No JVD.  Lungs are clear to auscultation with normal respiratory excursion, no wheezing or crackles.  Heart is regular with no murmur.  No ankle edema.  Abdomen is nontender.    Assessment/Plan  1. Type 2 diabetes mellitus without complication, without long-term current use of insulin  Well-controlled with metformin daily.  She will stay in the long-acting form of metformin.    Routine yearly eye exam in September  - Glycosylated Hemoglobin A1c  - Microalbumin, Random Urine    2. Essential hypertension  Well-controlled on 5 mg of lisinopril.  Check urine microalbumin.    3. Hypothyroidism, unspecified type  Denies missed doses.  Adjust Synthroid if needed.  Currently on the lower, 50 mcg dose since January.  - Thyroid Stimulating Hormone (TSH)    4. Hypercholesterolemia  Well-controlled in October with LDL 85 and HDL 86.  Lipitor 10 mg a day.    5. Osteopenia, unspecified location  Repeat bone density scan in 2-3 years.  Continue regular walking.  Calcium and vitamin D supplement.    6. Medication monitoring encounter    - Comprehensive Metabolic Panel    7. History of chronic sinusitis  Controlled " currently.  Flonase, Singulair, [place] pot.    Reflux controlled with Pepcid.    Routine mammogram due in August, she was reminded.    Return in about 5 months (around 11/5/2018) for Recheck.   Patient Instructions   No changes in medication.    Routine lab work today, and check a urine microalbumin.    Follow-up for routine checkup in 4-6 months.    Your mammogram is due in August.    Continue the Glucophage XR once a day.    Satya De La Cruz MD      Current Outpatient Prescriptions   Medication Sig Dispense Refill     atorvastatin (LIPITOR) 10 MG tablet Take 1 tablet (10 mg total) by mouth at bedtime. 90 tablet 3     blood glucose test (ACCU-CHEK SMARTVIEW TEST STRIP) strips Use 1 each As Directed as needed. Dispense brand per patient's insurance at pharmacy discretion. 50 strip 6     famotidine (PEPCID) 20 MG tablet TAKE 1 TABLET BY MOUTH 2 TIMES EVERY DAY  11     fluticasone (FLONASE) 50 mcg/actuation nasal spray 2 sprays into each nostril daily. 48 g 3     lancets (ULTRA THIN LANCETS) 30 gauge Misc Test sugars 1-2 time per week 100 each 3     levothyroxine (SYNTHROID, LEVOTHROID) 50 MCG tablet Take 1 tablet (50 mcg total) by mouth Daily at 6:00 am. 90 tablet 1     lisinopril (PRINIVIL,ZESTRIL) 5 MG tablet Take 1 tablet (5 mg total) by mouth daily. 30 tablet 5     LORazepam (ATIVAN) 0.5 MG tablet Take 0.5-1 mg by mouth as needed for anxiety (60 minutes before dental work).       metFORMIN (GLUCOPHAGE-XR) 500 MG 24 hr tablet Take 1 tablet (500 mg total) by mouth daily with breakfast. 90 tablet 3     montelukast (SINGULAIR) 10 mg tablet TAKE 1 TABLET BY MOUTH ONE TIME DAILY AT BEDTIME 90 tablet 3     neomycin-polymyxin-hydrocortisone (CORTISPORIN) 3.5-10,000-1 mg/mL-unit/mL-% otic suspension APPLY 3-5 DROP INTO BOTH EARS THREE TIMES A DAY AS NEEDED 10 mL 0     No current facility-administered medications for this visit.      Allergies   Allergen Reactions     Azithromycin      Clindamycin      Levofloxacin      Ears  buzzing     Sulfa (Sulfonamide Antibiotics)      Social History   Substance Use Topics     Smoking status: Never Smoker     Smokeless tobacco: Never Used     Alcohol use None

## 2021-06-19 NOTE — LETTER
Letter by Satya De La Cruz MD at      Author: Satya De La Cruz MD Service: -- Author Type: --    Filed:  Encounter Date: 8/22/2019 Status: (Other)         Courtney Singh  [address] [place] [address]             August 22, 2019         Dear Ms. Singh,    Below are the results from your recent visit:    Resulted Orders   Comprehensive Metabolic Panel   Result Value Ref Range    Sodium 139 136 - 145 mmol/L    Potassium 4.4 3.5 - 5.0 mmol/L    Chloride 102 98 - 107 mmol/L    CO2 28 22 - 31 mmol/L    Anion Gap, Calculation 9 5 - 18 mmol/L    Glucose 139 (H) 70 - 125 mg/dL    BUN 12 8 - 28 mg/dL    Creatinine 0.85 0.60 - 1.10 mg/dL    GFR MDRD Af Amer >60 >60 mL/min/1.73m2    GFR MDRD Non Af Amer >60 >60 mL/min/1.73m2    Bilirubin, Total 0.4 0.0 - 1.0 mg/dL    Calcium 9.8 8.5 - 10.5 mg/dL    Protein, Total 6.8 6.0 - 8.0 g/dL    Albumin 3.8 3.5 - 5.0 g/dL    Alkaline Phosphatase 72 45 - 120 U/L    AST 23 0 - 40 U/L    ALT 19 0 - 45 U/L    Narrative    Fasting Glucose reference range is 70-99 mg/dL per  American Diabetes Association (ADA) guidelines.   Glycosylated Hemoglobin A1c   Result Value Ref Range    Hemoglobin A1c 6.0 3.5 - 6.0 %   Thyroid Stimulating Hormone (TSH)   Result Value Ref Range    TSH 0.90 0.30 - 5.00 uIU/mL   Vitamin B12   Result Value Ref Range    Vitamin B-12 689 213 - 816 pg/mL       Kidney and liver tests are normal.    Hemoglobin A1c shows excellent control of blood sugars.  Continue current metformin.    Thyroid test is normal.  Continue current levothyroxine.    B12 level is normal.    Please call with questions or contact us using [email or web address].    Sincerely,        Electronically signed by Satya De La Cruz MD

## 2021-06-19 NOTE — LETTER
Letter by Satya De La Cruz MD at      Author: Satya De La Cruz MD Service: -- Author Type: --    Filed:  Encounter Date: 10/24/2019 Status: Signed         Courtney Singh  [address] [place] [address]             October 24, 2019         Dear Ms. Singh,    Below are the results from your recent visit:    Resulted Orders   Comprehensive Metabolic Panel   Result Value Ref Range    Sodium 136 136 - 145 mmol/L    Potassium 4.3 3.5 - 5.0 mmol/L    Chloride 99 98 - 107 mmol/L    CO2 27 22 - 31 mmol/L    Anion Gap, Calculation 10 5 - 18 mmol/L    Glucose 104 70 - 125 mg/dL    BUN 13 8 - 28 mg/dL    Creatinine 0.88 0.60 - 1.10 mg/dL    GFR MDRD Af Amer >60 >60 mL/min/1.73m2    GFR MDRD Non Af Amer >60 >60 mL/min/1.73m2    Bilirubin, Total 0.4 0.0 - 1.0 mg/dL    Calcium 9.6 8.5 - 10.5 mg/dL    Protein, Total 6.7 6.0 - 8.0 g/dL    Albumin 3.8 3.5 - 5.0 g/dL    Alkaline Phosphatase 77 45 - 120 U/L    AST 19 0 - 40 U/L    ALT 13 0 - 45 U/L    Narrative    Fasting Glucose reference range is 70-99 mg/dL per  American Diabetes Association (ADA) guidelines.   Glycosylated Hemoglobin A1c   Result Value Ref Range    Hemoglobin A1c 6.1 (H) 3.5 - 6.0 %   Thyroid Stimulating Hormone (TSH)   Result Value Ref Range    TSH 0.03 (L) 0.30 - 5.00 uIU/mL       Kidney and liver tests are normal.  Potassium level is normal.    Blood sugar is normal.    Diabetes is still well controlled with hemoglobin A1c of 6.1%.    The thyroid test, low TSH, shows that you are taking too much thyroid hormone.  Your previous thyroid test was normal, however.  Make sure you are not accidentally taking extra levothyroxine.  Continue on the 50 mcg once a day dose of levothyroxine at this time.  Plan to have your TSH rechecked on your November 25 appointment.    Please call with questions or contact us using [email or web address].    Sincerely,        Electronically signed by Satya De La Cruz MD

## 2021-06-20 ENCOUNTER — HEALTH MAINTENANCE LETTER (OUTPATIENT)
Age: 71
End: 2021-06-20

## 2021-06-20 NOTE — LETTER
Letter by Juan Francisco Kaiser CNP at      Author: Juan Francisco Kaiser CNP Service: -- Author Type: --    Filed:  Encounter Date: 1/31/2020 Status: (Other)         Courtney Singh  [address] [place] [address]             January 31, 2020         Dear Ms. Singh,    Below are the results from your recent visit:    Resulted Orders   Thyroid Stimulating Hormone (TSH)   Result Value Ref Range    TSH 0.45 0.30 - 5.00 uIU/mL       Thyroid labs look normal.    You appear to be taking the appropriate dose of your thyroid medication.    Please ensure that you are taking the rest of your medications appropriately.    [facility] for management of your Alzheimer's type dementia.-[phone].    Follow-up with Dr. Satya De La Cruz in 2 months as discussed.    Please call with questions or contact us using [facility].    Sincerely,        Electronically signed by Juan Francisco Kaiser CNP

## 2021-06-20 NOTE — LETTER
Letter by Nikkie Cueto LICSW at      Author: Nikkie Cueto LICSW Service: -- Author Type: --    Filed:  Encounter Date: 8/13/2020 Status: (Other)       Care Plan  About Me:    Patient Name:  Courtney Singh    YOB: 1950  Age:         70 y.o.   [facility] MRN:    [id number] Telephone Information:  Home Phone [phone]   Mobile [phone]       Address:  [address] [place] [address] Email address:  [email or web address]      Emergency Contact(s)  Extended Emergency Contact Information    Name: Tao Singh  Home Phone Number: [phone]  Relation: Spouse    Name: DONOVAN, DAUGHTER  Home Phone Number: [phone]  Relation: Child            My Access Plan  Medical Emergency 911   Primary Clinic Line Satya De La Cruz MD - [phone]   24 Hour Appointment Line [phone] or  [phone] ([phone]) (toll-free)   24 Hour Nurse Line [phone] (toll-free)   Preferred Urgent Care [facility], [phone]   [facility]  [phone]   Preferred Pharmacy [facility] [address] [facility] [employer] - [place], [place] [facility] [address] [facility] [address] [facility] [address]     Behavioral Health Crisis Line The National Suicide Prevention Lifeline at 1-561.444.7724 or 935     My Care Team Members  Patient Care Team       Relationship Specialty Notifications Start End    Satya De La Cruz MD PCP - General Internal Medicine  10/5/17     Phone: [phone] Fax: [phone]         [address] [place] [address]    Satya De La Cruz MD Assigned PCP   7/28/19     Phone: [phone] Fax: [phone]         [address] [place] [address]    Nikkie Cueto LICSW Clinic Care Coordinator Primary Care - [facility] Admissions 8/13/20     Fax: [phone]         Loli Smart CHW Community Health Worker Primary Care - [facility] Admissions 8/13/20     Phone: [phone] Fax: [phone]        Sulma Garcia,  RN Lead Care Coordinator Primary Care - [facility] Admissions 8/13/20     Fax: [phone]         Kathleen De LP Psychologist Psychology  8/13/20     Phone: [phone] [address] [place] [address]            My Care Plans  Self Management and Treatment Plan  Goals and (Comments)  Goals        General    Medication 1 (pt-stated)     Notes - Note edited  8/13/2020 10:02 AM by Nikkie Cueto [facility]    Goal Statement: I will discuss med management system with CC RN  Date Goal set: 8/13/20  Barriers: memory  Strengths: support from friends and family  Date to Achieve By: Sept 1   Patient expressed understanding of goal: Pt agreeable to RN follow up    Action steps to achieve this goal:  1. I will (along with daughter) discuss my medication management system and consider recommendations from CC RN    2. I will discuss taking Aricept with CC RN             My Medical and Care Information  Problem List   Patient Active Problem List   Diagnosis   ? Pure hypercholesterolemia   ? Anxiety   ? Type 2 diabetes mellitus without complication (H)   ? CXR Lungs Solitary Pulmonary Nodule (___ Cm)   ? Chronic Reflux Esophagitis   ? Osteoporosis   ? Essential hypertension   ? Medication monitoring encounter   ? Hypothyroidism, unspecified type   ? Hypercholesterolemia   ? Major neurocognitive disorder due to Alzheimer's disease, possible (H)      Current Medications and Allergies:  See printed Medication Report.    Care Coordination Start Date: 8/13/2020   Frequency of Care Coordination:     Form Last Updated: 08/13/2020

## 2021-06-20 NOTE — LETTER
Letter by Nikkie Cueto LICSW at      Author: Nikkie Cueto LICSW Service: -- Author Type: --    Filed:  Encounter Date: 8/13/2020 Status: (Other)       CARE COORDINATION  [facility]  [address] [place] [address]    August 13, 2020    Courtney Singh  [address] [place] [address]      Dear Courtney,    I am a clinic care coordinator  who works with Satya De La Cruz MD. I wanted to thank you for spending the time to talk with me.  Below is a description of clinic care coordination and how I can further assist you.      The clinic care coordination team is made up of a registered nurse,  and community health worker who understand the health care system. The goal of clinic care coordination is to help you manage your health and improve access to the health care system in the most efficient manner. The team can assist you in meeting your health care goals by providing education, coordinating services, strengthening the communication among your providers and supporting you with any resource needs.    Please feel free to contact the Community Health Worker at [phone] with any questions or concerns. We are focused on providing you with the highest-quality healthcare experience possible and that all starts with you.     Sincerely,     Nikkie ROMAN  Social Work Care Coordinator    Enclosed: I have enclosed a copy of the Care Plan. This has helpful information and goals that we have talked about. Please keep this in an easy to access place to use as needed.

## 2021-06-20 NOTE — LETTER
Letter by Ambreen Valle CNP at      Author: Ambreen Valle CNP Service: -- Author Type: --    Filed:  Encounter Date: 8/13/2020 Status: (Other)         Courtney Singh  [address] [place] [address]             August 13, 2020         Dear Ms. Singh,    Below are the results from your recent visit:    Resulted Orders   Glycosylated Hemoglobin A1c   Result Value Ref Range    Hemoglobin A1c 5.6 <=5.6 %      Comment:      Normal <5.7% Prediabete 5.7-6.4% Diabletes 6.5% or higher - adopted from ADA consensus guidelines   Comprehensive Metabolic Panel   Result Value Ref Range    Sodium 135 (L) 136 - 145 mmol/L    Potassium 4.7 3.5 - 5.0 mmol/L    Chloride 95 (L) 98 - 107 mmol/L    CO2 30 22 - 31 mmol/L    Anion Gap, Calculation 10 5 - 18 mmol/L    Glucose 114 70 - 125 mg/dL    BUN 12 8 - 28 mg/dL    Creatinine 0.85 0.60 - 1.10 mg/dL    GFR MDRD Af Amer >60 >60 mL/min/1.73m2    GFR MDRD Non Af Amer >60 >60 mL/min/1.73m2    Bilirubin, Total 0.5 0.0 - 1.0 mg/dL    Calcium 10.2 8.5 - 10.5 mg/dL    Protein, Total 7.1 6.0 - 8.0 g/dL    Albumin 4.1 3.5 - 5.0 g/dL    Alkaline Phosphatase 64 45 - 120 U/L    AST 20 0 - 40 U/L    ALT 14 0 - 45 U/L    Narrative    Fasting Glucose reference range is 70-99 mg/dL per  American Diabetes Association (ADA) guidelines.   Microalbumin, Random Urine   Result Value Ref Range    Microalbumin, Random Urine 3.61 (H) 0.00 - 1.99 mg/dL    Creatinine, Urine 112.0 mg/dL    Microalbumin/Creatinine Ratio Random Urine 32.2 (H) <=19.9 mg/g    Narrative    Microalbumin, Random Urine  <2.0 mg/dL . . . . . . . . Normal  3.0-30.0 mg/dL . . . . . . Microalbuminuria  >30.0 mg/dL . . . . . .  . Clinical Proteinuria    Microalbumin/Creatinine Ratio, Random Urine  <20 mg/g . . . . .. . . . Normal   mg/g . . . . . . . Microalbuminuria  >300 mg/g . . . . . . . . Clinical Proteinuria       Thyroid Cascade   Result Value Ref Range    TSH 1.67 0.30 - 5.00 uIU/mL       Your A1C looks great!   Keep up the good work!  Your microalbumin is mildly elevated.  This means that your kidneys are working harder than they should.  I encourage you to continue to consume a healthy diet, stay hydrated, and avoid Ibuprofen/Aleve. We will continue to monitor this at your future appointments.     Your liver function is normal.     Please call with questions or contact us using [employer].    Sincerely,        Electronically signed by Ambreen Valle CNP

## 2021-06-21 NOTE — LETTER
Letter by Ann Vences CHW at      Author: Ann Vences CHW Service: -- Author Type: --    Filed:  Encounter Date: 11/30/2020 Status: (Other)         November 30, 2020            Courtney Singh  [address] [place] [address]        Dear Toby Valdez enrolled with the [facility] Care Coordination Services.  In order for us to provide guidance we need to connect on a monthly bases.  We have tried calling you 2 times in the last month and have been unsuccessful in reaching you. Please call me at [phone] at your earliest convenience.  If you reach my voicemail, please leave a message with your daytime telephone number and a date and time that I can return your call.                                                                            Sincerely,        VIKRAM Juarez                                                                     Clinic Care Coordination                                          [facility]

## 2021-06-21 NOTE — LETTER
Letter by Brandy Gorman SW at      Author: Brandy Gorman SW Service: -- Author Type: --    Filed:  Encounter Date: 10/14/2020 Status: (Other)       CARE COORDINATION  [facility]    October 14, 2020    Courtney Singh  [facility] [place] [address]      Dear Courtney and family,    I am a clinic care coordinator who works with Ambreen Valle CNP and wanted to provide you with the phone number of [facility] - [phone].      The clinic care coordination team is made up of a registered nurse,  and community health worker who understand the health care system. The goal of clinic care coordination is to help you manage your health and improve access to the health care system in the most efficient manner. The team can assist you in meeting your health care goals by providing education, coordinating services, strengthening the communication among your providers and supporting you with any resource needs.    Please feel free to contact me at [phone] with any questions or concerns. We are focused on providing you with the highest-quality healthcare experience possible and that all starts with you.     Sincerely,     MARIANNA Guzman  Clinic Care Coordinator  [phone]      Enclosed: I have enclosed a copy of the Care Plan. This has helpful information and goals that we have talked about. Please keep this in an easy to access place to use as needed.

## 2021-06-21 NOTE — LETTER
Letter by Brandy Gorman SW at      Author: Brandy Gorman SW Service: -- Author Type: --    Filed:  Encounter Date: 12/9/2020 Status: (Other)       CARE COORDINATION  [facility]    December 9, 2020    Courtney Singh  [address] [place] [address]      Dear Courtney,  I have been unsuccessful in reaching you and your daughter since our last contact. At this time the Care Coordination team will make no further attempts to reach you, however this does not change your ability to continue receiving care from your providers at your primary care clinic. If you need additional support from a care coordinator in the future please contact Brandy at [phone].    All of us at [facility] are invested in your health and are here to assist you in meeting your goals.     Sincerely,    Brandy Gorman, [facility] Care Coordinator  [phone]

## 2021-06-21 NOTE — PROGRESS NOTES
[facility] clinic Follow Up Note    Courtney Singh   68 y.o. female    Date of Visit: 10/30/2018    Chief Complaint   Patient presents with     Diabetes     DM check     Subjective  Courtney is here for routine diabetic and blood pressure checkup.    She also has a complaint of her esophageal reflux not being adequately controlled with omeprazole once a day.  She had seen an ENT doctor in the past that had her go to omeprazole, but she felt the Pepcid twice a day was controlling her symptoms more.  She states she is compliant with the omeprazole.  He describes a epigastric to chest burning sensation usually after meals, which she feels consistent with heartburn.  Nonexertional.    She is very active and walks regularly and does yoga.  No exertional type chest pain or increasing shortness of breath.    She has chronic pharyngitis with postnasal drip and chronic sinusitis.  She is on Singulair and Myrtle pot and Flonase.  She does have significant pharyngeal irritation.    Her diabetes is well controlled with metformin 500 mg once a day.  She does not check her blood sugars.  Hemoglobin A1c in June was 5.6% with a normal urine microalbumin.    July 2018 ophthalmology check was negative for retinopathy in 1 year follow-up given.    Her hypertension has been well controlled on lisinopril 5 mg a day.  No lightheaded dizzy spells, but some mild orthostasis occasionally, but she is describing more of an unsteadiness feeling, mild.  No full vertigo.  No falls.  She does not check her blood pressure.    No lower extremity edema or claudication.  No foot numbness or sores.    No generalized myalgias on Lipitor 10 mg a day with well-controlled cholesterol 1 year ago with LDL 85 and HDL 86.    Hypothyroidism, reduced dose of Synthroid in January of this year but TSH was normal in June and on same dose of Synthroid.    DEXA scan October 2017 showed a spine score of -1.1, plan is for recheck in 2 years.    Mammogram July  2000 16-.    PMHx:  No past medical history on file.  PSHx:  No past surgical history on file.  Immunizations:   Immunization History   Administered Date(s) Administered     Influenza high dose, seasonal 09/17/2015, 10/03/2016, 10/05/2017, 10/30/2018     Influenza, inj, historic,unspecified 09/01/2012     Influenza, seasonal,quad inj 6-35 mos 10/18/2014     Influenza,seasonal quad, PF 10/16/2013     Pneumo Conj 13-V (2010&after) 08/03/2015     Pneumo Polysac 23-V 04/27/2012     Tdap 11/01/2013     ZOSTER, LIVE 04/12/2012       ROS A comprehensive review of systems was performed and was otherwise negative    Medications, allergies, and problem list were reviewed and updated    Exam  /52 (Patient Site: Right Arm, Patient Position: Sitting, Cuff Size: Adult Regular)  Pulse 75  Wt 119 lb 14.4 oz (54.4 kg)  SpO2 99%  BMI 21.58 kg/m2  Appears well.  No jaundice.  Pupils equal and reactive.  She has moderately severe cobblestoning and diffuse irritative pharyngitis.  No exudate or thrush.  No other oral lesions and teeth in good condition.  No cervical or supraclavicular adenopathy.  No neck mass.  No JVD and no carotid bruits.  Lungs clear to auscultation with good respiratory excursion.  Heart is regular with no murmur rub or gallop.  Abdomen shows some mild epigastric tenderness to deeper palpation but otherwise abdomen is nontender and soft.  No hepatomegaly or pulsatile mass.  No ankle edema.  Gait within normal limits.    Assessment/Plan  1. Essential hypertension  Controlled.  If increasing lightheaded dizzy spells, or blood pressures less than 110/60, she may need to reduce her lisinopril dose.  Continue same dose at this time.    2. Type 2 diabetes mellitus without complication, without long-term current use of insulin (H)  Well-controlled.  Continue metformin daily.  Regular exercise and diabetic diet.  - Glycosylated Hemoglobin A1c    Follow-up in 6 months for checkup    3. Pure  hypercholesterolemia  Well-controlled on Lipitor 10 mg    4. Hypothyroidism, unspecified type  Adjust Synthroid if needed, denies missed doses  - Thyroid Stimulating Hormone (TSH)    5. Medication monitoring encounter    - Comprehensive Metabolic Panel    6. Need for influenza vaccination    - Influenza High Dose, Seasonal 65+ yrs    7. Gastroesophageal reflux disease, esophagitis presence not specified  With significant irritative pharyngitis, likely some postnasal drip pharyngitis.  Continue Flonase and singular and Nickerson pot.    She can switch back to Pepcid twice daily, she could also consider Pepcid twice a day with omeprazole.  I suggested Metamucil with a glass of water in the evening, she may have some element of bile reflux.  Patient was told to follow-up if her symptoms are still not adequately controlled.    - famotidine (PEPCID) 20 MG tablet; Take 1 tablet (20 mg total) by mouth 2 (two) times a day.  Dispense: 180 tablet; Refill: 3      Osteopenia, consider repeat DEXA scan in 2 years.        Return in about 6 months (around 4/30/2019) for Recheck.   Patient Instructions   You can use famotidine 20 mg twice a day for your heartburn.  Consider 2 scoops of Metamucil with a glass of water every evening, which also helps with reflux.    If your symptoms are not adequately controlled, let me know, you can use omeprazole 20 mg daily with your famotidine twice a day.    Otherwise, continue same medication.    Follow-up in 6 months for routine checkup.    Routine lab work today.  Results will be mailed to you.    If your blood pressure gets less than 110/60, or worsening lightheaded dizzy spells, return to clinic to reevaluate your blood pressure treatment.    Satya De La Cruz MD  I spent a total time with patient of over 25 minutes and over 50% coord care.  Time all face to face.      Current Outpatient Prescriptions   Medication Sig Dispense Refill     atorvastatin (LIPITOR) 10 MG tablet Take 1 tablet (10 mg  total) by mouth at bedtime. 90 tablet 3     blood glucose test (ACCU-CHEK SMARTVIEW TEST STRIP) strips Use 1 each As Directed as needed. Dispense brand per patient's insurance at pharmacy discretion. 50 strip 6     famotidine (PEPCID) 20 MG tablet Take 1 tablet (20 mg total) by mouth 2 (two) times a day. 180 tablet 3     fluticasone (FLONASE) 50 mcg/actuation nasal spray 2 sprays into each nostril daily. 48 g 3     lancets (ULTRA THIN LANCETS) 30 gauge Misc Test sugars 1-2 time per week 100 each 3     levothyroxine (SYNTHROID, LEVOTHROID) 50 MCG tablet Take 1 tablet (50 mcg total) by mouth Daily at 6:00 am. 90 tablet 1     lisinopril (PRINIVIL,ZESTRIL) 5 MG tablet TAKE 1 TABLET BY MOUTH DAILY. 90 tablet 3     LORazepam (ATIVAN) 0.5 MG tablet Take 0.5-1 mg by mouth as needed for anxiety (60 minutes before dental work).       metFORMIN (GLUCOPHAGE-XR) 500 MG 24 hr tablet Take 1 tablet (500 mg total) by mouth daily with breakfast. 90 tablet 3     montelukast (SINGULAIR) 10 mg tablet TAKE 1 TABLET BY MOUTH ONE TIME DAILY AT BEDTIME 90 tablet 3     omeprazole (PRILOSEC) 40 MG capsule TAKE 1 CAP ONCE DAILY, 30-60 MIN. BEFORE LARGEST MEAL OF DAY, IF SYMPTOMS IMPROVE,TAKE FOR A FULL MO 90 capsule 3     No current facility-administered medications for this visit.      Allergies   Allergen Reactions     Azithromycin      Clindamycin      Levofloxacin      Ears buzzing     Sulfa (Sulfonamide Antibiotics)      Social History   Substance Use Topics     Smoking status: Never Smoker     Smokeless tobacco: Never Used     Alcohol use Not on file

## 2021-06-21 NOTE — LETTER
Letter by Brandy Gorman SW at      Author: Brandy Gorman SW Service: -- Author Type: --    Filed:  Encounter Date: 10/14/2020 Status: (Other)       Care Plan  About Me:    Patient Name:  Courtney Singh    YOB: 1950  Age:         70 y.o.   [facility] MRN:    [id number] Telephone Information:  Home Phone [phone]   Mobile [phone]       Address:  [address] [place] [address] Email address:  [email or web address]      Emergency Contact(s)  Extended Emergency Contact Information      Name: Bibiana Wright    Relation: Child      Name: Tao Singh    Home Phone Number: [phone]  Relation: Spouse      Name: DONOVAN DAUGHTER    Home Phone Number: [phone]  Relation: Child          Primary language:  English     needed? No   [facility]:  [phone] op. 1  Other communication barriers: Cognitive impairment  Preferred Method of Communication:  Dora  Current living arrangement: I live in a private home with spouse  Mobility Status/ Medical Equipment: Independent    Health Maintenance  Health Maintenance Reviewed: Not assessed    My Access Plan  Medical Emergency 911   Primary Clinic Line Ambreen Valle CNP - [phone]   24 Hour Appointment Line [phone] or  [phone] ([phone]) (toll-free)   24 Hour Nurse Line [phone] (toll-free)   Preferred Urgent Care [facility] [phone]   [facility]  [phone]   Preferred Pharmacy [facility] [address] [place] [facility] [place] [place] [facility] [address] [facility] [address]     Behavioral Health Crisis Line The National Suicide Prevention Lifeline at 1-212.289.7227 or 911             My Care Team Members  Patient Care Team       Relationship Specialty Notifications Start End    Ambreen Valle CNP PCP - General Family Medicine  8/10/20     Phone: [phone] Fax: [phone] [address] [facility] [address]    Kathleen De  DANIELA Bello Psychologist Psychology  8/13/20     Phone: [phone]          [address] [place] [address]    Ambreen Valle, CNP Assigned PCP   8/31/20     Phone: [phone] Fax: [phone] [address] [facility] [address]    Ann Vences, CHW Community Health Worker Primary Care - [facility] Admissions 9/1/20     Phone: [phone] Fax: [phone]        Brandy Gorman, MARY Clinic Care Coordinator Primary Care - [facility] Admissions 9/15/20     Phone: [phone]         Myhre, David J, RN Lead Care Coordinator Primary Care - [facility] Admissions 9/15/20     Fax: [phone]                 My Care Plans  Self Management and Treatment Plan  Goals and (Comments)  Goals        General    Functional (pt-stated)     Notes - Note created  10/14/2020  3:09 PM by Brandy Gorman, MARY    Goal Statement: I will complete home care and have support I need at home within next six months.  Date Goal set: 10-  Barriers: Live with my  who is not able to provide the care I need at home due his own frailty. COVID.   Strengths: supportive daughters and granddaughter.    Date to Achieve By: 3-  Patient expressed understanding of goal: elvin Mccarty set up.   Action steps to achieve this goal:  1. I will accept [facility] home care services.  2. I will work with home care to determine my needs.  3. Elvin Mccarty will contact care coordination team with any concerns or problems.                  Advance Care Plans/Directives Type:   Type Advanced Care Plans/Directives: Other    My Medical and Care Information  Problem List   Patient Active Problem List   Diagnosis   ? Pure hypercholesterolemia   ? Anxiety   ? Type 2 diabetes mellitus without complication (H)   ? Osteoporosis   ? Essential hypertension   ? Medication monitoring encounter   ? Hypothyroidism, unspecified type   ? Hypercholesterolemia   ? Major neurocognitive disorder due to Alzheimer's disease, possible (H)   ? Mucus plugging of  bronchi   ? Gastroesophageal reflux disease, unspecified whether esophagitis present      Current Medications and Allergies:  See printed Medication Report.    Care Coordination Start Date: 8/13/2020   Frequency of Care Coordination: monthly   Form Last Updated: 10/14/2020

## 2021-06-22 ENCOUNTER — RECORDS - HEALTHEAST (OUTPATIENT)
Dept: ADMINISTRATIVE | Facility: OTHER | Age: 71
End: 2021-06-22

## 2021-06-22 ENCOUNTER — AMBULATORY - HEALTHEAST (OUTPATIENT)
Dept: FAMILY MEDICINE | Facility: CLINIC | Age: 71
End: 2021-06-22

## 2021-06-22 ENCOUNTER — OFFICE VISIT (OUTPATIENT)
Dept: NEUROLOGY | Facility: CLINIC | Age: 71
End: 2021-06-22
Payer: COMMERCIAL

## 2021-06-22 VITALS
HEIGHT: 62 IN | WEIGHT: 112 LBS | BODY MASS INDEX: 20.61 KG/M2 | HEART RATE: 59 BPM | SYSTOLIC BLOOD PRESSURE: 139 MMHG | DIASTOLIC BLOOD PRESSURE: 60 MMHG

## 2021-06-22 DIAGNOSIS — E03.9 HYPOTHYROIDISM, UNSPECIFIED TYPE: ICD-10-CM

## 2021-06-22 DIAGNOSIS — R41.3 MEMORY LOSS: ICD-10-CM

## 2021-06-22 DIAGNOSIS — G30.1 LATE ONSET ALZHEIMER'S DISEASE WITHOUT BEHAVIORAL DISTURBANCE (H): Primary | ICD-10-CM

## 2021-06-22 DIAGNOSIS — F02.80 LATE ONSET ALZHEIMER'S DISEASE WITHOUT BEHAVIORAL DISTURBANCE (H): Primary | ICD-10-CM

## 2021-06-22 PROCEDURE — 99215 OFFICE O/P EST HI 40 MIN: CPT | Performed by: PSYCHIATRY & NEUROLOGY

## 2021-06-22 RX ORDER — RIVASTIGMINE 4.6 MG/24H
1 PATCH, EXTENDED RELEASE TRANSDERMAL DAILY
Qty: 30 PATCH | Refills: 11 | Status: CANCELLED | OUTPATIENT
Start: 2021-06-22

## 2021-06-22 RX ORDER — MEMANTINE HYDROCHLORIDE 5 MG/1
5 TABLET ORAL 2 TIMES DAILY
Qty: 60 TABLET | Refills: 11 | Status: SHIPPED | OUTPATIENT
Start: 2021-06-22 | End: 2021-11-19

## 2021-06-22 RX ORDER — DONEPEZIL HYDROCHLORIDE 5 MG/1
5 TABLET, FILM COATED ORAL AT BEDTIME
Qty: 90 TABLET | Refills: 3 | Status: SHIPPED | OUTPATIENT
Start: 2021-06-22

## 2021-06-22 RX ORDER — LEVOTHYROXINE SODIUM 88 UG/1
88 TABLET ORAL DAILY
Qty: 30 TABLET | Refills: 1 | Status: SHIPPED | OUTPATIENT
Start: 2021-06-22

## 2021-06-22 SDOH — HEALTH STABILITY: MENTAL HEALTH: HOW MANY STANDARD DRINKS CONTAINING ALCOHOL DO YOU HAVE ON A TYPICAL DAY?: NOT ASKED

## 2021-06-22 SDOH — HEALTH STABILITY: MENTAL HEALTH: HOW OFTEN DO YOU HAVE 6 OR MORE DRINKS ON ONE OCCASION?: NOT ASKED

## 2021-06-22 SDOH — HEALTH STABILITY: MENTAL HEALTH: HOW OFTEN DO YOU HAVE A DRINK CONTAINING ALCOHOL?: NOT ASKED

## 2021-06-22 ASSESSMENT — MONTREAL COGNITIVE ASSESSMENT (MOCA)
6. READ LIST OF DIGITS [FORWARD/BACKWARD]: 0
11. FOR EACH PAIR OF WORDS, WHAT CATEGORY DO THEY BELONG TO (OUT OF 2): 1
10. [FLUENCY] NAME WORDS STARTING WITH DESIGNATED LETTER: 1
12. MEMORY INDEX SCORE: 0
WHAT LEVEL OF EDUCATION WAS ATTAINED: 1
WHAT IS THE TOTAL SCORE (OUT OF 30): 8
VISUOSPATIAL/EXECUTIVE SUBSCORE: 1
9. REPEAT EACH SENTENCE: 0
7. [VIGILENCE] TAP WHEN HEARING DESIGNATED LETTER: 0
8. SERIAL SUBTRACTION OF 7S: 1
4. NAME EACH OF THE THREE ANIMALS SHOWN: 1
13. ORIENTATION SUBSCORE: 2

## 2021-06-22 ASSESSMENT — MIFFLIN-ST. JEOR: SCORE: 966.28

## 2021-06-22 NOTE — PROGRESS NOTES
HPI  3/9/2020, original in person consult  6/22/2021, in person follow-up      Chief complaint  Memory loss onset [id number]  Mother with history of dementia  Formal neuropsychometric testing October 9, 2019 moderate cerebral dysfunction  Aricept for couple months 5 mg once per day  Aricept increased to 10 mg daily 2020-03-09  Not clear that she stayed on the bigger dose may have had difficulty with decreased appetite weight loss and then somewhere along the line with switch back to 5 mg/day      Patient was to get a EEG and some laboratory data and follow-up in a month after original consult  Here after 1 year    TSH 12.23 on 2021-06-17    Patient living with granddaughter and daughter  Patient's  is in a care center  They are looking into a memory care unit  Power of  has been set up  Living will has been set up  End-of-life issues have been discussed      Patient needs cueing to eat  Does not help with meal prep  Really does not do much cleaning  Needs cues and help with medication  Can get dressed okay without cueing  Rarely might do some wandering  Does not have vivid dreams no hallucinations  Sleeps okay at night    Currently no heartburn no diarrhea and eating better when being supervised    Since last seen no hospitalizations  No surgeries  No major illnesses  Did not get Covid  Had the Covid vaccine but during maybe got tired after the second dose    Discussed with granddaughter issues above    Patient lives with her  in the house  Patient is no longer driving  Patient does some of the cooking   does the checkbook  She is okay with her activities of daily living  Symptoms started maybe 1 to 2 years ago vague onset asking questions too much  Seems to be forgetful              Past medical history  Alzheimer's type dementia onset 2018 at least  Hypothyroidism  Diabetes  Hypertension hyperlipidemia  GERD  Osteopenia  Anxiety    Habits  Does not smoke    Family history  Mother  with Alzheimer's disease  Dad's health unknown  4 sisters relatively healthy without memory loss  Some diabetes in the family      Work-up  CT scan head August 2019 mild small vessel changes and mild volume loss  August 2019 MRI scan verbal report volume loss small vessel changes  Formal neuropsychometric testing October 9, 2019 moderate cerebral dysfunction bitemporal regions consistent with Alzheimer's disease  January 2020 labs  TSH 0.45  Hemoglobin A1c 6.1%  Sodium 136 potassium 4.3 BUN 13 creatinine 0.88 glucose 104, AST 19  B12 502, (4/14/2021)  TSH 12.23 (2021-06-17)  EEG 11/10/2021,FIRDA type pattern  IMPRESSION: This outpatient EEG study is abnormal due to diffuse generalized slowing intermixed with intermittent semirhythmic frontally predominant delta slowing, consistent with moderate to severe nonspecific encephalopathy.  Generalized rhythmic delta activity with frontal predominance might be considered a normal finding in elderly drowsiness. Also, it may represent subcortical dysfunction secondary to vascular disease and/or other degenerative conditions.  No interictal epileptiform discharges, no electrographic or clinical seizures, and no paroxysmal behavioral events are seen during the study.  Clinical correlation is recommended.    [place]  March 9, 2020 17+1, 18 out of 30   June 22, 2021, 7+1, 8 out of 30        Review of systems  No headache no chest pain or shortness of breath no nausea vomiting no diarrhea no fever chills    No GERD  No blood in the stool  No black tarry stool  No diarrhea  Needs cueing to eat but is not losing weight further  Is on the lower dose of the Aricept    Memory is rapidly declining  No lightheadedness or fainting or falls pulse is 59    No diplopia dysarthria dysphagia  No focal weakness  No ataxia    Otherwise review of systems negative    General exam  Alert and attentive  HEENT normal  Lungs clear  Heart rate regular  Abdomen soft positive bowel sounds  Symmetrical  pulses  No edema in the feet    Neurologic exam  Alert attentive  Prosody speech normal  Naming normal  Repetition normal  Comprehension normal  No aphasia  No neglect  MoCA 8 out of 30    Cranials 2 through 12 normal  No ophthalmoplegia  No nystagmus  Visual fields intact  Face symmetrical  Tongue twisters good    Upper extremities  No drift no tremor normal finger-nose good dexterity    Lower extremities  Distal proximal strength good    Gait  Gets up from the chair ambulates without difficulty            Assessment/Plan     1.  Alzheimer disease (G30.1) Alzheimer's type dementia         I Aricept 5 mg once per day mg once per day discussed side effects and benefits had some difficulty with poor appetite better now when living with the daughter and granddaughter and now on a lower dose of Aricept also has some borderline bradycardia         B12 502 (4/14/2021)         TSH 12.23 (6/17/2021)         Check EEG    Living well discussed   Power of  discussed  End-of-life issues discussed  They have looked into memory care unit    Check EEG  Start memantine 5 mg twice daily  Discussed new medication is coming out do not think that is a good choice has some risk of intracranial hemorrhage    Reviewed past and present work-up  Reviewed primary MD notes 4/14/2021  Reviewed primary MD note 6/17/2021  Reviewed laboratory data    Total care time today 62 minutes  Addendum 11/10/2021    EEG 11/10/2021,FIRDA type pattern  IMPRESSION: This outpatient EEG study is abnormal due to diffuse generalized slowing intermixed with intermittent semirhythmic frontally predominant delta slowing, consistent with moderate to severe nonspecific encephalopathy.  Generalized rhythmic delta activity with frontal predominance might be considered a normal finding in elderly drowsiness. Also, it may represent subcortical dysfunction secondary to vascular disease and/or other degenerative conditions.  No interictal epileptiform discharges, no  electrographic or clinical seizures, and no paroxysmal behavioral events are seen during the study.  Clinical correlation is recommended.

## 2021-06-22 NOTE — LETTER
6/22/2021         RE: Courtney Singh  [address] [place] [address]        Dear Colleague,    Thank you for referring your patient, Courtney Singh, to the [facility] [place]. Please see a copy of my visit note below.        HPI  3/9/2020, original in person consult  6/22/2021, in person follow-up      Chief complaint  Memory onset [id number]  Mother with history of dementia  Formal neuropsychometric testing October 9, 2019 moderate cerebral dysfunction  Aricept for couple months 5 mg once per day  Aricept increased to 10 mg daily 2020-03-09  Not clear that she stayed on the bigger dose may have had difficulty with decreased appetite weight loss and then somewhere along the line with switch back to 5 mg/day      Patient was to get a EEG and some laboratory data and follow-up in a month after original consult  Here after 1 year    TSH 12.23 on 2021-06-17    Patient living with granddaughter and daughter  Patient's  is in a care center  They are looking into a memory care unit  Power of  has been set up  Living will has been set up  End-of-life issues have been discussed      Patient needs cueing to eat  Does not help with meal prep  Really does not do much cleaning  Needs cues and help with medication  Can get dressed okay without cueing  Rarely might do some wandering  Does not have vivid dreams no hallucinations  Sleeps okay at night    Currently no heartburn no diarrhea and eating better when being supervised    Since last seen no hospitalizations  No surgeries  No major illnesses  Did not get Covid  Had the Covid vaccine but during maybe got tired after the second dose    Discussed with granddaughter issues above    Patient lives with her  in the house  Patient is no longer driving  Patient does some of the cooking   does the checkbook  She is okay with her activities of daily living  Symptoms started maybe 1 to 2 years ago vague onset  asking questions too much  Seems to be forgetful              Past medical history  Alzheimer's type dementia onset 2018 at least  Hypothyroidism  Diabetes  Hypertension hyperlipidemia  GERD  Osteopenia  Anxiety    Habits  Does not smoke    Family history  Mother with Alzheimer's disease  Dad's health unknown  4 sisters relatively healthy without memory loss  Some diabetes in the family      Work-up  CT scan head August 2019 mild small vessel changes and mild volume loss  August 2019 MRI scan verbal report volume loss small vessel changes  Formal neuropsychometric testing October 9, 2019 moderate cerebral dysfunction bitemporal regions consistent with Alzheimer's disease  January 2020 labs  TSH 0.45  Hemoglobin A1c 6.1%  Sodium 136 potassium 4.3 BUN 13 creatinine 0.88 glucose 104, AST 19  B12 502, (4/14/2021)  TSH 12.23 (2021-06-17)  [place]  March 9, 2020 17+1, 18 out of 30   June 22, 2021, 7+1, 8 out of 30    Review of systems  No headache no chest pain or shortness of breath no nausea vomiting no diarrhea no fever chills    No GERD  No blood in the stool  No black tarry stool  No diarrhea  Needs cueing to eat but is not losing weight further  Is on the lower dose of the Aricept    Memory is rapidly declining  No lightheadedness or fainting or falls pulse is 59    No diplopia dysarthria dysphagia  No focal weakness  No ataxia    Otherwise review of systems negative    General exam  Alert and attentive  HEENT normal  Lungs clear  Heart rate regular  Abdomen soft positive bowel sounds  Symmetrical pulses  No edema in the feet    Neurologic exam  Alert attentive  Prosody speech normal  Naming normal  Repetition normal  Comprehension normal  No aphasia  No neglect  MoCA 8 out of 30    Cranials 2 through 12 normal  No ophthalmoplegia  No nystagmus  Visual fields intact  Face symmetrical  Tongue twisters good    Upper extremities  No drift no tremor normal finger-nose good dexterity    Lower extremities  Distal proximal  strength good    Gait  Gets up from the chair ambulates without difficulty            Assessment/Plan     1.  Alzheimer disease (G30.1) Alzheimer's type dementia         I Aricept 5 mg once per day mg once per day discussed side effects and benefits had some difficulty with poor appetite better now when living with the daughter and granddaughter and now on a lower dose of Aricept also has some borderline bradycardia         B12 502 (4/14/2021         TSH 12.23 (6/17/2021)         Check EEG    Living well discussed   Power of  discussed  End-of-life issues discussed  They have looked into memory care unit    Check EEG  Start memantine 5 mg twice daily  Discussed new medication is coming out do not think that is a good choice has some risk of intracranial hemorrhage    Reviewed past and present work-up  Reviewed primary MD notes 4/14/2021  Reviewed primary MD note 6/17/2021  Reviewed laboratory data    Total care time today 62 minutes            Again, thank you for allowing me to participate in the care of your patient.        Sincerely,        Marshal Cai MD

## 2021-06-22 NOTE — NURSING NOTE
RASHEED COGNITIVE ASSESSMENT (MOCA)  Version 7.1 Original Version  VISUOSPATIAL/EXECUTIVE               COPY CUBE      [  0  ]                                [ 0   ] DRAW CLOCK (Ten past eleven)  (3 points)    [  1  ]                    [  0  ]               [   0 ]       Contour            Numbers     Hands POINTS                  1 / 5   NAMING    [ 0  ]                                                                        [  0  ]                                             [  1  ]  Liverna Geiger                                Camel                    1 / 3   MEMORY Read list of words, subject must repeat them. Do 2 trials, even if 1st trial is successful. Do a recall after 5 minutes  FACE VELVET Confucianism NEERU RED No Points    1st          2nd         ATTENTION Read list of digits (1 digit/sec) Subject has to repeat in the forward order       [ 0   ]   2  1  8  5  4                                [  0  ] 7 4 2                         0 /2   Read list of letters. The subject must tap with his hand at each letter A. No points if > 2 errors.  [ 0   ] F B A C M N A A J K L B A F A K D E A A A J A M O F A A B            0  /1   Serial 7 subtraction starting at 100          [ 1   ] 93         [  0  ] 86          [   0 ] 79          [  0  ] 72         [  0  ] 65   4 or 5 correct subtractions: 3 points,  2 or 3 correct: 2 points,  1correct: 1 point,   0 correct: 0 points           1 /3   LANGUAGE Repeat: I only know that Ishmael is the one to help today. [ 0    ]                                      The cat always hid under the couch when dogs were in the room. [0   ]             0  /2   Fluency: Name maximum number of words in one minute that begin with the letter F                                                                                                                    [  1  ] ___ (N > 11 words)             1  /1   ABSTRACTION Similarity  between e.g. banana-orange=fruit                                                                   [ 1   ] train-bicycle                      [   0 ] watch-ruler             1 /2   DELAYED  RECALL Has to recall words  WITH NO CUE FACE  [ 0   ] VELVET  [  0  ] Zoroastrian  [ 0   ]  NEERU  [ 0   ] RED  [   0 ] Points for UNCUED recall only           0 /5           OPTIONAL Category cue           Multiple choice cue          ORIENTATION  [ 0   ] Date     [ 0   ] Month       [   0 ] Year      [  1  ] Day      [   0 ] Place        [ 1   ] City          2/6   TOTAL  Normal > 26/30 Add 1 point if < 12 years education      8 /30

## 2021-06-23 NOTE — TELEPHONE ENCOUNTER
Refill Approved    Rx renewed per Medication Renewal Policy. Medication was last renewed on 12/3/17.    Harleen Vegas, [facility] Triage/Med Refill 2/8/2019     Requested Prescriptions   Pending Prescriptions Disp Refills     atorvastatin (LIPITOR) 10 MG tablet [Pharmacy Med Name: ATORVASTATIN 10 MG TABLET] 90 tablet 3     Sig: TAKE 1 TABLET (10 MG TOTAL) BY MOUTH AT BEDTIME.    Statins Refill Protocol (Hmg CoA Reductase Inhibitors) Passed - 2/6/2019  9:19 PM       Passed - PCP or prescribing provider visit in past 12 months     Last office visit with prescriber/PCP: 10/30/2018 Satya De La Cruz MD OR same dept: 10/30/2018 aStya De La Cruz MD OR same specialty: 10/30/2018 Satya De L aCruz MD  Last physical: 2/21/2018 Last MTM visit: Visit date not found   Next visit within 3 mo: Visit date not found  Next physical within 3 mo: Visit date not found  Prescriber OR PCP: Satya De La Cruz MD  Last diagnosis associated with med order: 1. Hyperlipidemia  - atorvastatin (LIPITOR) 10 MG tablet [Pharmacy Med Name: ATORVASTATIN 10 MG TABLET]; Take 1 tablet (10 mg total) by mouth at bedtime.  Dispense: 90 tablet; Refill: 3    If protocol passes may refill for 12 months if within 3 months of last provider visit (or a total of 15 months).

## 2021-06-24 NOTE — PROGRESS NOTES
Clinic Note    Assessment:     Assessment and Plan:    1. Sore throat  Rapid strep and rapid influenza testing are negative today.  I explained to her that she is likely dealing with a viral upper respiratory infection.  Anticipatory guidance was provided and reassurance was given.  See patient instructions below for plan of care.  - Rapid Strep A Screen-Throat  - Group A Strep, RNA Direct Detection, Throat         Patient Instructions   Your rapid strep testing was negative today.  You do not have strep throat.    I do not believe that you have influenza, given your symptoms.    You likely have a viral upper respiratory infection.    DayQuil during the day.  NyQuil at night.    Warm decaffeinated tea with honey.    Menthol flavored throat lozenges.    Continue with salt water gargles.    Drink plenty of water to avoid getting dehydrated.    Wash your hands frequently to prevent others from getting sick.    If you start to feel worse, come back to see me.    Return for If symptoms do not start to improve in one week..         Subjective:      Patient comes to clinic today for sore throat.    Patient states that symptoms started approximately 7 days ago.  Her throat has been managed with salt water gargles.  She has not been taking any ibuprofen or Tylenol.  No cough medication.  She has a dry cough at times that seems to be worse when laying down.  Slight nasal congestion.  No fevers.  No sick contacts at home.  No chest pain or shortness of breath.    The following portions of the patient's history were reviewed and updated as appropriate: Allergies, medications, problem list, prior note.       Review of Systems:    Review is otherwise negative except for what is mentioned above.     Social Hx:    Social History     Tobacco Use   Smoking Status Never Smoker   Smokeless Tobacco Never Used         Objective:     Vitals:    02/19/19 1344   BP: 144/66   Patient Site: Right Arm   Patient Position: Sitting   Cuff Size:  Adult Regular   Pulse: 66   Temp: 97.6  F (36.4  C)   SpO2: 98%   Weight: 118 lb 4.8 oz (53.7 kg)       Exam:    General: No apparent distress. Calm. Alert and Oriented X3. Pt behavior is appropriate.  Head:Atraumatic. Normocephalic, non-tender to palpation  Neck: Supple. No JVD. Full ROM. No adenopathy  Eyes: PERRL, No discharge. No strabismus. No nystagmus.  Ears: TMs pearly gray with landmarks visible.   Nose/Mouth/Throat: Patent nares, no oral lesions, pharynx clear and without exudate. Uvula mid-line. Nasal septum mid-line. Clear turbinates.   Lymph: No axillar or cervical adenopathy.   Chest/Lungs: Normal chest wall, clear to auscultation, normal respiratory effort and rate.   Heart/Pulses: Regular rate and rhythm, strong and equal radial pulses, no murmurs. Capillary refill <2 seconds. No edema.   Abdomen: Soft, no palpable masses. No hepatosplenomegaly, no tenderness with palpation noted. Bowel sounds active in all quadrants. No increased tympany.   Genitalia: Not examined.   Musculoskeletal: No CVA tenderness with palpation. Good ROM with extremities.   Neurologic: Interactive, alert, no focal findings, CNs intact.   Skin: Warm, dry. Normal hair pattern. Free of lesions. Normal skin turgor.       Patient Active Problem List   Diagnosis     Pure hypercholesterolemia     Anxiety     Type 2 diabetes mellitus without complication (H)     CXR Lungs Solitary Pulmonary Nodule (___ Cm)     Chronic Reflux Esophagitis     Osteoporosis     Essential hypertension     Medication monitoring encounter     Hypothyroidism, unspecified type     Hypercholesterolemia     Current Outpatient Medications   Medication Sig Dispense Refill     atorvastatin (LIPITOR) 10 MG tablet Take 1 tablet (10 mg total) by mouth at bedtime. 90 tablet 2     blood glucose test (ACCU-CHEK SMARTVIEW TEST STRIP) strips Use 1 each As Directed as needed. Dispense brand per patient's insurance at pharmacy discretion. 50 strip 6     famotidine (PEPCID) 20  MG tablet Take 1 tablet (20 mg total) by mouth 2 (two) times a day. 180 tablet 3     fluticasone (FLONASE) 50 mcg/actuation nasal spray 2 sprays into each nostril daily. 48 g 3     lancets (ULTRA THIN LANCETS) 30 gauge Misc Test sugars 1-2 time per week 100 each 3     levothyroxine (SYNTHROID, LEVOTHROID) 50 MCG tablet Take 1 tablet (50 mcg total) by mouth Daily at 6:00 am. 90 tablet 1     lisinopril (PRINIVIL,ZESTRIL) 5 MG tablet TAKE 1 TABLET BY MOUTH DAILY. 90 tablet 3     LORazepam (ATIVAN) 0.5 MG tablet Take 0.5-1 mg by mouth as needed for anxiety (60 minutes before dental work).       metFORMIN (GLUCOPHAGE-XR) 500 MG 24 hr tablet Take 1 tablet (500 mg total) by mouth daily with breakfast. 90 tablet 3     montelukast (SINGULAIR) 10 mg tablet TAKE 1 TABLET BY MOUTH ONE TIME DAILY AT BEDTIME 90 tablet 3     omeprazole (PRILOSEC) 40 MG capsule TAKE 1 CAP ONCE DAILY, 30-60 MIN. BEFORE LARGEST MEAL OF DAY, IF SYMPTOMS IMPROVE,TAKE FOR A FULL MO 90 capsule 3     No current facility-administered medications for this visit.        I spent 25 minutes with patient face to face, of which >50% was counseling regarding the above plan       Juan Francisco Kaiser (Rob), CNP    2/19/2019

## 2021-06-24 NOTE — PATIENT INSTRUCTIONS - HE
Your rapid strep testing was negative today.  You do not have strep throat.    I do not believe that you have influenza, given your symptoms.    You likely have a viral upper respiratory infection.    DayQuil during the day.  NyQuil at night.    Warm decaffeinated tea with honey.    Menthol flavored throat lozenges.    Continue with salt water gargles.    Drink plenty of water to avoid getting dehydrated.    Wash your hands frequently to prevent others from getting sick.    If you start to feel worse, come back to see me.

## 2021-06-25 ENCOUNTER — COMMUNICATION - HEALTHEAST (OUTPATIENT)
Dept: FAMILY MEDICINE | Facility: CLINIC | Age: 71
End: 2021-06-25

## 2021-06-25 DIAGNOSIS — E78.00 PURE HYPERCHOLESTEROLEMIA: ICD-10-CM

## 2021-06-25 RX ORDER — ATORVASTATIN CALCIUM 20 MG/1
TABLET, FILM COATED ORAL
Qty: 90 TABLET | Refills: 1 | Status: SHIPPED | OUTPATIENT
Start: 2021-06-25

## 2021-06-25 NOTE — TELEPHONE ENCOUNTER
Refill Approved    Rx renewed per Medication Renewal Policy. Medication was last renewed on 10/30/18.    Leida Ashby, [facility] Triage/Med Refill 3/21/2019     Requested Prescriptions   Pending Prescriptions Disp Refills     ACCU-CHEK SMARTVIEW TEST STRIP strips [Pharmacy Med Name: ACCU-CHEK SMARTVIEW TEST STRIP] 50 strip 0     Sig: TEST ONCE DAILY.    Diabetic Supplies Refill Protocol Passed - 3/19/2019  5:07 PM       Passed - Visit with PCP or prescribing provider visit in last 6 months    Last office visit with prescriber/PCP: 10/30/2018 Satya De La Cruz MD OR same dept: 2/19/2019 Juan Francisco Kaiser CNP OR same specialty: 2/19/2019 Juan Francisco Kaiser CNP  Last physical: 2/21/2018 Last MTM visit: Visit date not found   Next visit within 3 mo: Visit date not found  Next physical within 3 mo: Visit date not found  Prescriber OR PCP: Satya De La Cruz MD  Last diagnosis associated with med order: 1. Type II diabetes mellitus (H)  - ACCU-CHEK SMARTVIEW TEST STRIP strips [Pharmacy Med Name: ACCU-CHEK SMARTVIEW TEST STRIP]; TEST ONCE DAILY.  Dispense: 50 strip; Refill: 0    If protocol passes may refill for 12 months if within 3 months of last provider visit (or a total of 15 months).            Passed - A1C in last 6 months    Hemoglobin A1c   Date Value Ref Range Status   10/30/2018 6.0 3.5 - 6.0 % Final

## 2021-06-25 NOTE — TELEPHONE ENCOUNTER
Refill Approved    Rx renewed per Medication Renewal Policy. Medication was last renewed on 2/20/20.    Nohemy Dunn, [facility] Triage/Med Refill 6/3/2021     Requested Prescriptions   Pending Prescriptions Disp Refills     famotidine (PEPCID) 20 MG tablet [Pharmacy Med Name: FAMOTIDINE 20 MG TABLET] 180 tablet 3     Sig: TAKE 1 TABLET BY MOUTH TWICE A DAY       GI Medications Refill Protocol Passed - 6/2/2021  3:50 PM        Passed - PCP or prescribing provider visit in last 12 or next 3 months.     Last office visit with prescriber/PCP: 10/23/2019 Satya De La Cruz MD OR same dept: Visit date not found OR same specialty: 1/29/2020 Juan Francisco Kaiser CNP  Last physical: 2/21/2018 Last MTM visit: Visit date not found   Next visit within 3 mo: Visit date not found  Next physical within 3 mo: Visit date not found  Prescriber OR PCP: Satya De La Cruz MD  Last diagnosis associated with med order: 1. Gastroesophageal reflux disease  - famotidine (PEPCID) 20 MG tablet [Pharmacy Med Name: FAMOTIDINE 20 MG TABLET]; TAKE 1 TABLET BY MOUTH TWICE A DAY  Dispense: 180 tablet; Refill: 3    If protocol passes may refill for 12 months if within 3 months of last provider visit (or a total of 15 months).

## 2021-06-26 NOTE — PROGRESS NOTES
Assessment and Plan:   Courtney is a 71-year-old female here for annual wellness visit with additional concerns.    1. Routine general medical examination at a health care facility  Annual wellness visit completed today. Labs as below. Vaccine as below. Ordered DEXA today, otherwise up to date with screenings.   - Pneumococcal polysaccharide vaccine 23-valent 3 yo or older, subq/IM    2. Major neurocognitive disorder due to Alzheimer's disease, possible (H)  Hx Alzheimer's based on neuropsychology testing/neurology evaluation. On aricept. Needing assistance with ADLs/IADLs. Family does not feel that they can continue to provide appropriate care for pt and are looking into memory care facility - this seems appropriate to me based on our discussion. Will complete form as below to assist with this.     3. Encounter for completion of form with patient  Obtained form for facility form Ambreen Valle, prior PCP - completed part of form with pt/granddaughter in office - will fax to facility when completed.    4. Type 2 diabetes mellitus without complication, without long-term current use of insulin (H)  Hx DM2, on metformin, last A1c 5.6%, no hx retinopathy, neuropathy or nephropathy. Labs as below. Request glucose meter as this was recommended to them previously by PCP, discussed that technically not needed as just on metformin but pt/family request so sent in.   - Microalbumin, Random Urine  - blood glucose meter (GLUCOMETER); Test one time daily. DX: E11.9 Dispense brand per patient's insurance at pharmacy discretion.  Dispense: 1 each; Refill: 0    5. Hypothyroidism, unspecified type  Hx hypothyroidism, on synthroid, will recheck levels today.  - Thyroid Lajas  - T4, Free    6. Hypercholesterolemia  Hx HLD, on statin, will recheck levels today.  - Lipid Lajas FASTING    7. Essential hypertension  History hypertension, on lisinopril, blood pressure within goal range today, will check BMP today.  - Basic Metabolic  Panel    8. Postmenopausal status  Due for DEXA scan, ordered today.  - DXA Bone Density Scan; Future    9. Encounter for immunization   Due for immunization, administered today.  - Pneumococcal polysaccharide vaccine 23-valent 1 yo or older, subq/IM        The patient's current medical problems were reviewed.    I have had an Advance Directives discussion with the patient.  The following health maintenance schedule was reviewed with the patient and provided in printed form in the after visit summary:   Health Maintenance   Topic Date Due     HEPATITIS C SCREENING  Never done     ZOSTER VACCINES (2 of 3) 06/07/2012     DIABETIC FOOT EXAM  10/05/2018     DEXA SCAN  08/22/2019     DIABETIC EYE EXAM  07/22/2021     A1C  10/14/2021     MEDICARE ANNUAL WELLNESS VISIT  06/17/2022     BMP  06/17/2022     LIPID  06/17/2022     MICROALBUMIN  06/17/2022     FALL RISK ASSESSMENT  06/17/2022     MAMMOGRAM  09/28/2022     TD 18+ HE  11/01/2023     COLORECTAL CANCER SCREENING  06/25/2025     ADVANCE CARE PLANNING  06/17/2026     Pneumococcal Vaccine: Pediatrics (0 to 5 Years) and At-Risk Patients (6 to 64 Years)  Completed     Pneumococcal Vaccine: 65+ Years  Completed     INFLUENZA VACCINE RULE BASED  Completed     COVID-19 Vaccine  Completed       Subjective:   Chief Complaint: Courtney Singh is an 71 y.o. female here for an Annual Wellness visit.   HPI:    Memory loss/dementia: has been living with daughter/granddaughter (2 bedroom apt, sleeping on cough currently), her  is in hospital (otherwise at [facility], needs 24/7 assistance), lots of falls - looking into memory care facility  Family assists with ADLs/IADLs - can bathe/dress self, family helps with meals and meds  Follows with neurology apt on 6/22  No issues swallowing/taking meds - no concerns/side effects    Review of Systems:   Please see above.  The rest of the review of systems are negative for all systems.    Patient Care Team:  Ambreen Valle CNP as  PCP - General (Family Medicine)  Kathleen De Psy.D, LP as Psychologist (Psychology)  Ambreen Valle CNP as Assigned PCP  Gavi Easton MD as Assigned Pulmonology Provider     Patient Active Problem List   Diagnosis     Anxiety     Type 2 diabetes mellitus without complication (H)     Osteoporosis     Essential hypertension     Hypothyroidism, unspecified type     Hypercholesterolemia     Major neurocognitive disorder due to Alzheimer's disease, possible (H)     Mucus plugging of bronchi     Gastroesophageal reflux disease, unspecified whether esophagitis present     Neoplasm of uncertain behavior of stomach, intestines, and rectum     Neoplasm of digestive system     Heartburn     History reviewed. No pertinent past medical history.   History reviewed. No pertinent surgical history.   History reviewed. No pertinent family history.   Social History     Socioeconomic History     Marital status:      Spouse name: Not on file     Number of children: 3     Years of education: Not on file     Highest education level: Not on file   Occupational History     Not on file   Social Needs     Financial resource strain: Not on file     Food insecurity     Worry: Never true     Inability: Never true     Transportation needs     Medical: No     Non-medical: No   Tobacco Use     Smoking status: Never Smoker     Smokeless tobacco: Never Used   Substance and Sexual Activity     Alcohol use: Not on file     Drug use: Not on file     Sexual activity: Not on file   Lifestyle     Physical activity     Days per week: 5 days     Minutes per session: 30 min     Stress: Not on file   Relationships     Social connections     Talks on phone: Twice a week     Gets together: Not on file     Attends Druze service: Not on file     Active member of club or organization: Not on file     Attends meetings of clubs or organizations: Not on file     Relationship status:      Intimate partner violence     Fear of current or  "ex partner: Not on file     Emotionally abused: Not on file     Physically abused: Not on file     Forced sexual activity: Not on file   Other Topics Concern     Not on file   Social History Narrative     Not on file      Current Outpatient Medications   Medication Sig Dispense Refill     atorvastatin (LIPITOR) 20 MG tablet TAKE 1 TABLET BY MOUTH EVERY DAY 90 tablet 1     blood glucose meter (GLUCOMETER) Test one time daily. DX: E11.9 Dispense brand per patient's insurance at pharmacy discretion. 1 each 0     blood glucose test strips Test one time daily. DX: E11.9 Dispense brand per patient's insurance at pharmacy discretion. 100 strip 11     donepeziL (ARICEPT) 5 MG tablet TAKE 1 TABLET BY MOUTH EVERYDAY AT BEDTIME 90 tablet 3     famotidine (PEPCID) 20 MG tablet TAKE 1 TABLET BY MOUTH TWICE A  tablet 3     fluticasone (FLONASE) 50 mcg/actuation nasal spray 2 sprays into each nostril daily. 48 g 3     generic lancets (FINGERSTIX LANCETS) Test one time daily. DX: E11.9 Dispense brand per patient's insurance at pharmacy discretion. 100 each 11     levothyroxine (SYNTHROID, LEVOTHROID) 50 MCG tablet Take 1 tablet (50 mcg total) by mouth Daily at 6:00 am. 90 tablet 3     lisinopriL (PRINIVIL,ZESTRIL) 5 MG tablet Take 1 tablet (5 mg total) by mouth daily. 90 tablet 3     metFORMIN (GLUCOPHAGE-XR) 500 MG 24 hr tablet Take 1 tablet (500 mg total) by mouth daily with breakfast. 90 tablet 0     montelukast (SINGULAIR) 10 mg tablet TAKE 1 TABLET BY MOUTH ONE TIME DAILY AT BEDTIME (Patient taking differently: Take 10 mg by mouth at bedtime. TAKE 1 TABLET BY MOUTH ONE TIME DAILY AT BEDTIME      ) 90 tablet 3     No current facility-administered medications for this visit.       Objective:   Vital Signs:   Visit Vitals  /76   Pulse 61   Ht 5' 2\" (1.575 m)   Wt 105 lb (47.6 kg)   SpO2 98%   BMI 19.20 kg/m         PHYSICAL EXAM  General appearance: awake, NAD, here with granddaughter  HEENT: atraumatic, " normocephalic, no scleral icterus or injection, moist mucous membranes  CV: RRR, no murmurs/rubs/gallops, normal S1 and S2  Lungs: CTAB, no wheezes or crackles, breathing comfortably on room air  Extremities: no LE edema bilaterally, moving all extremities  Neuro: alert, CNs grossly intact, no focal deficits appreciated  Psych: normal mood/affect/behavior - tearful while discussing moving into facility, answering questions appropriately, linear thought process      Assessment Results 6/17/2021   Activities of Daily Living 2-4 - Moderate impairment   Instrumental Activities of Daily Living 5-6 - Severe functional impairment   Get Up and Go Score Less than 12 seconds   Mini Cog Total Score 1   Some recent data might be hidden     A Mini-Cog score of 0-2 suggests the possibility of dementia, score of 3-5 suggests no dementia    Identified Health Risks:     The patient was provided with suggestions to help her develop a healthy physical lifestyle.   She is at risk for lack of exercise and has been provided with information to increase physical activity for the benefit of her well-being.  The patient was counseled and encouraged to consider modifying their diet and eating habits. She was provided with information on recommended healthy diet options.  The patient reports that she has difficulty with activities of daily living.  The patient reports that she has difficulty with instrumental activities of daily living.  She was provided with a list of local organizations that provide support services   The patient was provided with written information regarding signs of hearing loss.  The patient was provided with suggestions to help her develop a healthy emotional lifestyle.   She is at risk for falling and has been provided with information to reduce the risk of falling at home.  Patient's advanced directive was discussed and I am comfortable with the patient's wishes.  The patient was provided with appropriate referrals  to address her memory problem.

## 2021-06-28 ENCOUNTER — RECORDS - HEALTHEAST (OUTPATIENT)
Dept: ADMINISTRATIVE | Facility: OTHER | Age: 71
End: 2021-06-28

## 2021-06-28 ENCOUNTER — RECORDS - HEALTHEAST (OUTPATIENT)
Dept: BONE DENSITY | Facility: CLINIC | Age: 71
End: 2021-06-28

## 2021-06-28 DIAGNOSIS — Z78.0 ASYMPTOMATIC MENOPAUSAL STATE: ICD-10-CM

## 2021-06-29 ENCOUNTER — RECORDS - HEALTHEAST (OUTPATIENT)
Dept: INTERNAL MEDICINE | Facility: CLINIC | Age: 71
End: 2021-06-29

## 2021-06-29 NOTE — PROGRESS NOTES
Progress Notes by Nikkie Cueto LICSW at 8/13/2020  9:00 AM     Author: Nikkie Cueto LICSW Service: -- Author Type:     Filed: 8/13/2020 11:19 AM Encounter Date: 8/13/2020 Status: Signed    : Nikkie Cueto LICSW ()       Clinic Care Coordination Contact    Clinic Care Coordination Contact  OUTREACH- INITIAL ASSESSMENT    Referral Information:  Referral Source: PCP    Primary Diagnosis: Cognitive Impairment    Chief Complaint   Patient presents with   ? Clinic Care Coordination - Initial        Universal Utilization:   Clinic Utilization  Difficulty keeping appointments:: No  Compliance Concerns: Yes  No-Show Concerns: No  No PCP office visit in Past Year: No  Utilization    Last refreshed: 8/13/2020  9:48 AM:  Hospital Admissions 0           Last refreshed: 8/13/2020  9:48 AM:  ED Visits 0           Last refreshed: 8/13/2020  9:48 AM:  No Show Count (past year) 0              Current as of: 8/13/2020  9:48 AM              Clinical Concerns:  Current Medical Concerns: Diabetes (controlled by diet), hypertension, Alzheimers  Current Behavioral Concerns: Memory impairment, anxiety (per chart)       Medication Management:  Daughter Liza assists    Functional Status:  Bed or wheelchair confined:: No  Mobility Status: Independent  Fallen 2 or more times in the past year?: No  Any fall with injury in the past year?: No    Living Situation:  Current living arrangement:: I live in a private home with spouse  Type of residence:: Private home - stairs    Lifestyle & Psychosocial Needs:  Lifestyle   ? Physical activity     Days per week: 5 days     Minutes per session: 30 min   ? Stress: Not on file     Social Needs   ? Financial resource strain: Not on file   ? Food insecurity     Worry: Never true     Inability: Never true   ? Transportation needs     Medical: No     Non-medical: No     Diet:: Regular, Diabetic diet  Inadequate nutrition (GOAL):: No  Tube Feeding: No  Inadequate  activity/exercise (GOAL):: No  Significant changes in sleep pattern (GOAL): No  Transportation means:: Family, Friend     Jehovah's witness or spiritual beliefs that impact treatment:: No  Mental health DX:: No  Mental health management concern (GOAL):: No  Informal Support system:: Children, Friends, Spouse   Socioeconomic History   ? Marital status:      Spouse name: Not on file   ? Number of children: 3   ? Years of education: Not on file   ? Highest education level: Not on file   Relationships   ? Social connections     Talks on phone: Twice a week     Gets together: Not on file     Attends Protestant service: Not on file     Active member of club or organization: Not on file     Attends meetings of clubs or organizations: Not on file     Relationship status:    ? Intimate partner violence     Fear of current or ex partner: Not on file     Emotionally abused: Not on file     Physically abused: Not on file     Forced sexual activity: Not on file     Tobacco Use   ? Smoking status: Never Smoker   ? Smokeless tobacco: Never Used         Resources and Interventions:  Current Resources: none identified      Supplies used at home:: None  Equipment Currently Used at Home: none    Advance Care Plan/Directive  Advanced Care Plans/Directives on file:: No  Type Advanced Care Plans/Directives: Other  Advanced Care Plan/Directive Status: Declined Further Information      Goals:   Goals        General    Medication 1 (pt-stated)     Notes - Note edited  8/13/2020 10:02 AM by Nikkie Cueto, [facility]    Goal Statement: I will discuss med management system with CC RN  Date Goal set: 8/13/20  Barriers: memory  Strengths: support from friends and family  Date to Achieve By: Sept 1   Patient expressed understanding of goal: Pt agreeable to RN follow up    Action steps to achieve this goal:  1. I will (along with daughter) discuss my medication management system and consider recommendations from CC RN    2. I will discuss  taking Aricept with CC RN            Patient/Caregiver understanding: Pt agreeable to follow up from RN.  She did not want to schedule follow up.  CC SW consulted with CC RN and scheduled follow up for 1 week        Future Appointments              In 6 days WBY CCC RN [facility], [facility]    In 1 month Satya De La Cruz MD [facility] Internal Medicine, [facility]          Plan: CC RN to discuss medication management system with pt and likely helpful to include pts daughter in discussion    CC RN to add additional goals if needs/resources identified

## 2021-07-02 ENCOUNTER — RECORDS - HEALTHEAST (OUTPATIENT)
Dept: FAMILY MEDICINE | Facility: CLINIC | Age: 71
End: 2021-07-02

## 2021-07-04 NOTE — TELEPHONE ENCOUNTER
Telephone Encounter by Dede Chun CMA at 7/2/2021 10:49 AM     Author: Dede Cuhn CMA Service: -- Author Type: Certified Medical Assistant    Filed: 7/2/2021 10:49 AM Encounter Date: 7/2/2021 Status: Signed    : Dede Chun CMA (Certified Medical Assistant)       ----- Message from Tereza Dimas MD sent at 7/1/2021  8:57 PM CDT -----  Please call pt/family to let them know that her DEXA scan shows osteoporosis - we should have a visit (can be virtual) to discuss treatment options to prevent fractures.    Thanks  Dr Dimas

## 2021-07-04 NOTE — TELEPHONE ENCOUNTER
Telephone Encounter by Dede Chun CMA at 7/2/2021 10:49 AM     Author: Dede Chun CMA Service: -- Author Type: Certified Medical Assistant    Filed: 7/2/2021 11:07 AM Encounter Date: 7/2/2021 Status: Signed    : Dede Chun CMA (Certified Medical Assistant)       I gave test results to Pt  Daughter- Adriana. Daughter informed me that pt moved in to memory care at [facility] and to contact Nurse Liza Ybarra [phone]. The main number of the facility is [phone].

## 2021-07-04 NOTE — TELEPHONE ENCOUNTER
Telephone Encounter by Dede Chun CMA at 6/29/2021  3:26 PM     Author: Dede Chun CMA Service: -- Author Type: Certified Medical Assistant    Filed: 6/29/2021  3:27 PM Encounter Date: 6/28/2021 Status: Signed    : Dede Chun CMA (Certified Medical Assistant)       Med list faxed.

## 2021-07-04 NOTE — TELEPHONE ENCOUNTER
Telephone Encounter by Dede Chun CMA at 7/2/2021 11:02 AM     Author: Dede Chun CMA Service: -- Author Type: Certified Medical Assistant    Filed: 7/2/2021 11:07 AM Encounter Date: 7/2/2021 Status: Signed    : Dede Chun CMA (Certified Medical Assistant)       I called Adriana (daughter ) again and we schedule an apt for pt next week

## 2021-07-04 NOTE — TELEPHONE ENCOUNTER
Telephone Encounter by Dede Chun CMA at 7/2/2021 10:55 AM     Author: Dede Chun CMA Service: -- Author Type: Certified Medical Assistant    Filed: 7/2/2021 11:07 AM Encounter Date: 7/2/2021 Status: Signed    : Dede Chun CMA (Certified Medical Assistant)       I got hold of Amada a Nurse from [facility]. Amada stated that is something the family need to take care off . But she has questing about the medication list. On her medication list there is a glucose monitor. She is wondering why they need to do check her Glucose?  and is requesting a refill on her lancets and test trip.

## 2021-07-05 ENCOUNTER — COMMUNICATION - HEALTHEAST (OUTPATIENT)
Dept: FAMILY MEDICINE | Facility: CLINIC | Age: 71
End: 2021-07-05

## 2021-07-05 DIAGNOSIS — E03.9 HYPOTHYROIDISM, UNSPECIFIED TYPE: ICD-10-CM

## 2021-07-05 PROBLEM — Z51.81 MEDICATION MONITORING ENCOUNTER: Status: RESOLVED | Noted: 2017-11-16 | Resolved: 2021-06-16

## 2021-07-05 NOTE — TELEPHONE ENCOUNTER
Telephone Encounter by Gaudencio Lares CMA at 7/5/2021  4:24 PM     Author: Gaudencio Lares CMA Service: -- Author Type: Certified Medical Assistant    Filed: 7/5/2021  4:28 PM Encounter Date: 7/5/2021 Status: Signed    : Gaudencio Lares CMA (Certified Medical Assistant)       Calling from [employer]    They are requesting a med list and last physical and immunization and last labs.    They are sending admission orders late Monday for hopeful completion on Tuesday.    I gathered some paperwork ready for fax.  The med list just needs Dr Dimas's signature.    Then it all can be faxed to .    Awaiting incoming fax as of 4:27pm.    Dede please watch for order fax from [employer] and fax everything to [employer]. Thank you

## 2021-07-06 ENCOUNTER — RECORDS - HEALTHEAST (OUTPATIENT)
Dept: ADMINISTRATIVE | Facility: OTHER | Age: 71
End: 2021-07-06

## 2021-07-06 VITALS
BODY MASS INDEX: 19.32 KG/M2 | DIASTOLIC BLOOD PRESSURE: 76 MMHG | HEIGHT: 62 IN | HEART RATE: 61 BPM | OXYGEN SATURATION: 98 % | WEIGHT: 105 LBS | SYSTOLIC BLOOD PRESSURE: 132 MMHG

## 2021-07-06 NOTE — TELEPHONE ENCOUNTER
Telephone Encounter by Tereza Dimas MD at 7/6/2021  2:46 PM     Author: Tereza Dimas MD Service: -- Author Type: Physician    Filed: 7/6/2021  2:46 PM Encounter Date: 7/5/2021 Status: Signed    : Tereza Dimas MD (Physician)       I did complete these after pt's appointment on 6/17 but maybe they are still in scanning (unfortunately not yet under media tab). Completed forms now. Will give to Dede to fax.    Dr Dimas

## 2021-07-06 NOTE — TELEPHONE ENCOUNTER
Telephone Encounter by Gaudencio Lares CMA at 7/6/2021 10:43 AM     Author: Gaudencio Lares CMA Service: -- Author Type: Certified Medical Assistant    Filed: 7/6/2021 10:43 AM Encounter Date: 7/5/2021 Status: Signed    : Gaudencio Lares CMA (Certified Medical Assistant)       I am not sure if you have gotten a fax for admission orders on her yet

## 2021-07-06 NOTE — TELEPHONE ENCOUNTER
Telephone Encounter by Parvin Sprague at 7/6/2021 11:15 AM     Author: Parvin Sprague Service: -- Author Type: Patient Access    Filed: 7/6/2021 11:15 AM Encounter Date: 7/5/2021 Status: Signed    : Parvin Sprague (Patient Access)       Paperwork given to Dr. Dimas - waiting for them to be completed then will be faxed.

## 2021-07-06 NOTE — TELEPHONE ENCOUNTER
[facility]      Name: Evette Medina  Clinic Number: [id number]    Therapy Diagnosis:   Encounter Diagnoses   Name Primary?    Decreased range of motion of trunk and back     Decreased strength of trunk and back     Decreased strength, endurance, and mobility     Posture imbalance     Decreased strength of lower extremity      Physician: Azra Smith MD    Visit Date: 2020    Physician Orders: PT Eval and Treat    Medical Diagnosis from Referral: M54.17 (ICD-10-CM) - Lumbosacral radiculopathy  Evaluation Date: 2020  Authorization Period Expiration: 20    Plan of Care Expiration: 21  (signed POC)  Reassessment Due: 20  Visit # / Visits authorized:      Time In: 1000  Time Out: 1100   Total Billable Time: 60 minutes     Precautions: Standard, 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 Went up and down stairs twice since last therapy visit and it was doable but not easy but she is happy with this progress.       Patient reports tolerating previous visit well, minimal soreness  Patient reports their pain to be 2/10 on a 0-10 scale with 0 being no pain and 10 being the worst pain imaginable.  Pain Location:  Low back and left leg when she has symptoms     Occupation: retired  Leisure: watch sports, visit with family      Prior Level of Function: indep before 2 months ago  Current Level of Function: struggling to bath self,difficult with walking in store, not shopping, not going to 2 nd floor of house, uses walker in community  DME owned/used: walker  Pts goals: drive, shop, make food, do stairs       Objective     Baseline Isometric Testing on Med X equipment: Testing administered by PT  Date of testin20  ROM 6-36 deg   Max  Telephone Encounter by Gaudencio Lares CMA at 7/6/2021  8:42 AM     Author: Gaudencio Lares CMA Service: -- Author Type: Certified Medical Assistant    Filed: 7/6/2021  8:42 AM Encounter Date: 7/5/2021 Status: Signed    : Gaudencio Lares CMA (Certified Medical Assistant)       I have everything ready for you         Peak Torque 71    Min Peak Torque 24    Flex/Ext Ratio 3/1   % below normative data -41%            Limitation/Restriction for FOTO 11/9/20 Survey     Therapist reviewed FOTO scores for Evette Medina on 11/9/2020.   FOTO documents entered into [employer] - see Media section.     Limitation Score: 48%  Goal: <30 % limited              Treatment    Pt was instructed in and performed the following:     Evette received therapeutic exercises to develop/improved posture, cardiovascular endurance, muscular endurance, lumbar ROM, strength and muscular endurance for 60 minutes including the following exercises:       NuStep 8 min  Single knee to chest 10  Pelvic tilts x 20  PPT + bent knee fallout 2x5  Supine hip abd x 15 ea  Supine hip flex/add    [facility] 12/18/2020   Visit Number 4   VAS Pain Rating 2   Treadmill Time (in min.) -   Recumbent Bike - Seat Pos. -   Time 8   Lumbar Stretches - Slouch 10   Flexion in Lying 10   Lumbar Extension - Seat Pad -   Femur Restraint -   Top Dead Center -   Counterweight -   Lumbar Flexion -   Lumbar Extension -   Lumbar Peak Torque -   Lumbar Weight 40   Repetitions 17   Rating of Perceived Exertion 4         Peripheral muscle strengthening which included 1 set of 15-20 repetitions at a slow, controlled 10-13 second per rep pace focused on strengthening supporting musculature for improved body mechanics and functional mobility.  Pt and therapist focused on proper form during treatment to ensure optimal strengthening of each targeted muscle group.  Machines were utilized including torso rotation without weight and range only, chest press, upright row,  Tricep extension, bicep cur.  leg extension, leg curl, , leg press, and hip abduction and hip add added visit 3      Home Exercises Provided and Patient Education Provided   Home exercises include:Supine pelvic tilts  Supine single knee to chest  Sitting LAQ to strengthen quads  Sit to stand  Cardio program:walk 5 min in  home daily with instructions given    Education provided:   - walking daily at home  - Continued used of HEP  - Rationale for POC and progression    Written Home Exercises Provided: yes.  Exercises were reviewed and Evette was able to demonstrate them prior to the end of the session.  Evette demonstrated good  understanding of the education provided.     See EMR under Patient Instructions for exercises provided prior visit.          Assessment     Evette performed well in today's session, tolerating progression without significant exacerbation of pain. She arrived ambulating with SPC, was able to use effectively throughout session. Consider use of treadmill starting next session. Did not perform stairs though she says she has done them on numerous occasions at home and is building her tolerance slowly. MedX rameters not progressed this session due to length of time since last session, Evette completed 17 repetitions of 40 ft-lbs through range of 12-36 degrees with a final RPE of 4/10; will maintain settings until patient can comfortably achieve 20 repetitions.      Pt will continue to benefit from skilled outpatient physical therapy to address the deficits stated in the impairment chart, provide pt/family education and to maximize pt's level of independence in the home and community environment.     Anticipated Barriers for therapy: attending due to transportation  Pt's spiritual, cultural and educational needs considered and pt agreeable to plan of care and goals as stated below:     GOALS: Pt is in agreement with the following goals.     Short term goals:  6 weeks or 10 visits   1.  Pt will demonstrate increased lumbar ROM by at least 6 degrees from the initial ROM value with improvements noted in functional ROM and ability to perform ADLs.  approp and ongoing  2.  Pt will demonstrate increased MedX average isometric strength value  by 10% from initial test resulting in improved ability to perform bending, lifting,  and carrying activities safely, confidently.approp and ongoing     3.  Patient report a reduction in worst pain score by 1-2 points for improved tolerance for 6/10 at worst.approp and ongoing  4.  Pt able to perform HEP correctly with minimal cueing or supervision from therapist to encourage independent management of symptoms approp and ongoing  5. Sit to stand 6 times in 30   approp and ongoing        Long term goals: 10 weeks or 20 visits   1. Pt will demonstrate increased lumbar ROM by at least 12 degrees from initial ROM value, resulting in improved ability to perform functional fwd bending while standing and sitting. approp and ongoing  2. Pt will demonstrate increased MedX average isometric strength value  by 20% from initial test resulting in improved ability to perform bending, lifting, and carrying activities safely, confidently.approp and ongoing  3. Pt to demonstrate ability to independently control and reduce their pain through posture positioning and mechanical movements throughout a typical day.approp and ongoing  4.  Pt will demonstrate reduced pain and improved functional outcomes as reported on the FOTO by reaching a limitation score of < or = 30% or less in order to demonstrate subjective improvement in pt's condition.  approp and ongoing  5. Pt will demonstrate independence with the HEP at discharge  approp and ongoing  6.  Able to go up and down stairs at home  approp and ongoing  7. Return to shopping and walking  approp and ongoing  8. Reduce pain< 5/10 at worst approp and ongoing  9. Amb without walker in community  approp and ongoing       Plan   Continue with established Plan of Care towards established PT goals.     Nba Vallejo, PT  12/18/2020

## 2021-07-07 ENCOUNTER — RECORDS - HEALTHEAST (OUTPATIENT)
Dept: ADMINISTRATIVE | Facility: OTHER | Age: 71
End: 2021-07-07

## 2021-07-07 NOTE — TELEPHONE ENCOUNTER
Telephone Encounter by Dede Chun CMA at 7/7/2021 10:28 AM     Author: Dede Chun CMA Service: -- Author Type: Certified Medical Assistant    Filed: 7/7/2021 10:31 AM Encounter Date: 7/5/2021 Status: Signed    : Dede Chun CMA (Certified Medical Assistant)       I faxed form Yesterday 07/06/2021. I have them on my desk still to make sure they went thru. I will put them for scanning today

## 2021-07-07 NOTE — TELEPHONE ENCOUNTER
Refill Approved    Rx renewed per Medication Renewal Policy.     Stefany Bailon, [facility] Triage/Med Refill 6/25/2021     Requested Prescriptions   Pending Prescriptions Disp Refills     atorvastatin (LIPITOR) 20 MG tablet [Pharmacy Med Name: ATORVASTATIN 20 MG TABLET] 90 tablet 1     Sig: TAKE 1 TABLET BY MOUTH EVERY DAY       Statins Refill Protocol (Hmg CoA Reductase Inhibitors) Passed - 6/25/2021  2:01 AM        Passed - PCP or prescribing provider visit in past 12 months      Last office visit with prescriber/PCP: 4/14/2021 Ambreen Valle CNP OR same dept: 4/14/2021 Ambreen Valle CNP OR same specialty: 4/14/2021 Ambreen Valle CNP  Last physical: Visit date not found Last MTM visit: Visit date not found   Next visit within 3 mo: Visit date not found  Next physical within 3 mo: Visit date not found  Prescriber OR PCP: Ambreen Valle CNP  Last diagnosis associated with med order: 1. Pure hypercholesterolemia  - atorvastatin (LIPITOR) 20 MG tablet [Pharmacy Med Name: ATORVASTATIN 20 MG TABLET]; TAKE 1 TABLET BY MOUTH EVERY DAY  Dispense: 90 tablet; Refill: 1    If protocol passes may refill for 12 months if within 3 months of last provider visit (or a total of 15 months).

## 2021-07-07 NOTE — TELEPHONE ENCOUNTER
Patient saw Dr. Dimas most recently for annual wellness visit.  Ok to fax current medication list?

## 2021-07-07 NOTE — TELEPHONE ENCOUNTER
Reason for Call:  Other prescription     Detailed comments: Amada from [facility] in [place] requesting medication list with all sigs for patient to be faxed over to her at [phone].    Phone Number Patient can be reached at: Other phone number:  [phone]    Best Time: Any    Can we leave a detailed message on this number?: No    Call taken on 6/28/2021 at 12:04 PM by Rosmery Cole

## 2021-07-08 ENCOUNTER — RECORDS - HEALTHEAST (OUTPATIENT)
Dept: ADMINISTRATIVE | Facility: OTHER | Age: 71
End: 2021-07-08

## 2021-07-15 ENCOUNTER — LAB REQUISITION (OUTPATIENT)
Dept: LAB | Facility: CLINIC | Age: 71
End: 2021-07-15
Payer: COMMERCIAL

## 2021-07-15 DIAGNOSIS — F03.90 UNSPECIFIED DEMENTIA WITHOUT BEHAVIORAL DISTURBANCE: ICD-10-CM

## 2021-07-15 DIAGNOSIS — E11.9 TYPE 2 DIABETES MELLITUS WITHOUT COMPLICATIONS (H): ICD-10-CM

## 2021-07-16 LAB
ALBUMIN SERPL-MCNC: 3.5 G/DL (ref 3.5–5)
ALP SERPL-CCNC: 65 U/L (ref 45–120)
ALT SERPL W P-5'-P-CCNC: 33 U/L (ref 0–45)
ANION GAP SERPL CALCULATED.3IONS-SCNC: 8 MMOL/L (ref 5–18)
AST SERPL W P-5'-P-CCNC: 30 U/L (ref 0–40)
BILIRUB SERPL-MCNC: 0.4 MG/DL (ref 0–1)
BUN SERPL-MCNC: 17 MG/DL (ref 8–28)
CALCIUM SERPL-MCNC: 9.2 MG/DL (ref 8.5–10.5)
CHLORIDE BLD-SCNC: 93 MMOL/L (ref 98–107)
CO2 SERPL-SCNC: 29 MMOL/L (ref 22–31)
CREAT SERPL-MCNC: 0.84 MG/DL (ref 0.6–1.1)
ERYTHROCYTE [DISTWIDTH] IN BLOOD BY AUTOMATED COUNT: 11.6 % (ref 10–15)
GFR SERPL CREATININE-BSD FRML MDRD: 69 ML/MIN/1.73M2
GLUCOSE BLD-MCNC: 189 MG/DL (ref 70–125)
HBA1C MFR BLD: 5.8 %
HCT VFR BLD AUTO: 34.9 % (ref 35–47)
HGB BLD-MCNC: 11.7 G/DL (ref 11.7–15.7)
MCH RBC QN AUTO: 31.8 PG (ref 26.5–33)
MCHC RBC AUTO-ENTMCNC: 33.5 G/DL (ref 31.5–36.5)
MCV RBC AUTO: 95 FL (ref 78–100)
PLATELET # BLD AUTO: 260 10E3/UL (ref 150–450)
POTASSIUM BLD-SCNC: 4.4 MMOL/L (ref 3.5–5)
PROT SERPL-MCNC: 6.6 G/DL (ref 6–8)
RBC # BLD AUTO: 3.68 10E6/UL (ref 3.8–5.2)
SODIUM SERPL-SCNC: 130 MMOL/L (ref 136–145)
TSH SERPL DL<=0.005 MIU/L-ACNC: 14.98 UIU/ML (ref 0.3–5)
VIT B12 SERPL-MCNC: 364 PG/ML (ref 213–816)
WBC # BLD AUTO: 5 10E3/UL (ref 4–11)

## 2021-07-16 PROCEDURE — 82607 VITAMIN B-12: CPT | Mod: ORL | Performed by: INTERNAL MEDICINE

## 2021-07-16 PROCEDURE — 84443 ASSAY THYROID STIM HORMONE: CPT | Mod: ORL | Performed by: INTERNAL MEDICINE

## 2021-07-16 PROCEDURE — P9604 ONE-WAY ALLOW PRORATED TRIP: HCPCS | Mod: ORL | Performed by: INTERNAL MEDICINE

## 2021-07-16 PROCEDURE — 85027 COMPLETE CBC AUTOMATED: CPT | Mod: ORL | Performed by: INTERNAL MEDICINE

## 2021-07-16 PROCEDURE — 83036 HEMOGLOBIN GLYCOSYLATED A1C: CPT | Mod: ORL | Performed by: INTERNAL MEDICINE

## 2021-07-16 PROCEDURE — 80053 COMPREHEN METABOLIC PANEL: CPT | Mod: ORL | Performed by: INTERNAL MEDICINE

## 2021-07-16 PROCEDURE — 36415 COLL VENOUS BLD VENIPUNCTURE: CPT | Mod: ORL | Performed by: INTERNAL MEDICINE

## 2021-07-21 ENCOUNTER — DOCUMENTATION ONLY (OUTPATIENT)
Dept: OTHER | Facility: CLINIC | Age: 71
End: 2021-07-21

## 2021-07-22 NOTE — LETTER
Letter by Tereza Dimas MD at      Author: Tereza Dimas MD Service: -- Author Type: --    Filed:  Encounter Date: 7/5/2021 Status: (Other)         Courtney Singh  [address] [place] [address]             July 5, 2021         Dear Ms. Singh,    Below are the results from your recent visit:      Recent Results (from the past 720 hour(s))   Basic Metabolic Panel    Collection Time: 06/17/21 10:50 AM   Result Value Ref Range    Sodium 133 (L) 136 - 145 mmol/L    Potassium 4.6 3.5 - 5.0 mmol/L    Chloride 95 (L) 98 - 107 mmol/L    CO2 26 22 - 31 mmol/L    Anion Gap, Calculation 12 5 - 18 mmol/L    Glucose 95 70 - 125 mg/dL    Calcium 9.7 8.5 - 10.5 mg/dL    BUN 15 8 - 28 mg/dL    Creatinine 0.79 0.60 - 1.10 mg/dL    GFR MDRD Af Amer >60 >60 mL/min/1.73m2    GFR MDRD Non Af Amer >60 >60 mL/min/1.73m2   Lipid [place] FASTING    Collection Time: 06/17/21 10:50 AM   Result Value Ref Range    Cholesterol 155 <=199 mg/dL    Triglycerides 43 <=149 mg/dL    HDL Cholesterol 79 >=50 mg/dL    LDL Calculated 67 <=129 mg/dL    Patient Fasting > 8hrs? Yes    Thyroid [place]    Collection Time: 06/17/21 10:50 AM   Result Value Ref Range    TSH 12.23 (H) 0.30 - 5.00 uIU/mL   T4, Free    Collection Time: 06/17/21 10:50 AM   Result Value Ref Range    Free T4 0.9 0.7 - 1.8 ng/dL   Microalbumin, Random Urine    Collection Time: 06/17/21 10:58 AM   Result Value Ref Range    Microalbumin, Random Urine 0.58 0.00 - 1.99 mg/dL    Creatinine, Urine 32.7 mg/dL    Microalbumin/Creatinine Ratio Random Urine 17.7 <=19.9 mg/g     Hi Courtney and family,     Your test results have returned.     Your urine test was normal. No evidence of protein in the urine.   Your thyroid test was abnormal with high TSH level. This tells me that your thyroid medication dose is too low - I will send in a new prescription now.   Your kidney function is normal.   Your sodium and chloride levels are a little low. Make sure you are eating and  drinking normally. We will recheck at your next visit but it is not urgent.  Your cholesterol levels look great.    Please call with questions or contact us using [email or web address].    Sincerely,        Electronically signed by Tereza Dimas MD

## 2021-08-02 DIAGNOSIS — M81.0 AGE-RELATED OSTEOPOROSIS WITHOUT CURRENT PATHOLOGICAL FRACTURE: ICD-10-CM

## 2021-08-05 RX ORDER — ALENDRONATE SODIUM 70 MG/1
TABLET ORAL
Qty: 4 TABLET | Refills: 11 | Status: SHIPPED | OUTPATIENT
Start: 2021-08-05 | End: 2022-08-09

## 2021-08-05 NOTE — TELEPHONE ENCOUNTER
Last saw Dr Dimas 7/9/21    1. Age-related osteoporosis without current pathological fracture  New diagnosis of osteoporosis based on recent DEXA result. Recommend starting oral bisphosphonate - will do weekly as below, pt gets help with meds at facility. Discussed needing to take 30+ mins before food/beverage and other medications, as well as remaining sitting/standing 30+ mins after taking. Plan repeat DEXA in 2 years.  - alendronate (FOSAMAX) 70 MG tablet; Take 1 tablet (70 mg total) by mouth every 7 days. Take in the morning on an empty stomach with a full glass of water 30 minutes before food  Dispense: 4 tablet; Refill: 11

## 2021-08-06 NOTE — PATIENT INSTRUCTIONS - HE
Patient Instructions by Tereza Dimas MD at 6/17/2021  9:50 AM     Author: Tereza Dimas MD Service: -- Author Type: Physician    Filed: 6/17/2021 10:24 AM Encounter Date: 6/17/2021 Status: Addendum    : Tereza Dimas MD (Physician)    Related Notes: Original Note by Tereza Dimas MD (Physician) filed at 6/17/2021 10:05 AM         Patient Education     Your Health Risk Assessment indicates you feel you are not in good physical health.    A healthy lifestyle helps keep the body fit and the mind alert. It helps protect you from disease, helps you fight disease, and helps prevent chronic disease (disease that doesn't go away) from getting worse. This is important as you get older and begin to notice twinges in muscles and joints and a decline in the strength and stamina you once took for granted. A healthy lifestyle includes good healthcare, good nutrition, weight control, recreation, and regular exercise. Avoid harmful substances and do what you can to keep safe. Another part of a healthy lifestyle is stay mentally active and socially involved.    Good healthcare     Have a wellness visit every year.     If you have new symptoms, let us know right away. Don't wait until the next checkup.     Take medicines exactly as prescribed and keep your medicines in a safe place. Tell us if your medicine causes problems.   Healthy diet and weight control     Eat 3 or 4 small, nutritious, low-fat, high-fiber meals a day. Include a variety of fruits, vegetables, and whole-grain foods.     Make sure you get enough calcium in your diet. Calcium, vitamin D, and exercise help prevent osteoporosis (bone thinning).     If you live alone, try eating with others when you can. That way you get a good meal and have company while you eat it.     Try to keep a healthy weight. If you eat more calories than your body uses for energy, it will be stored as fat and you will gain weight.     Recreation   Recreation is not  limited to sports and team events. It includes any activity that provides relaxation, interest, enjoyment, and exercise. Recreation provides an outlet for physical, mental, and social energy. It can give a sense of worth and achievement. It can help you stay healthy.       Patient Education     Exercise for a Healthier Heart  You may wonder how you can improve the health of your heart. If youre thinking about exercise, youre on the right track. You dont need to become an athlete, but you do need a certain amount of brisk exercise to help strengthen your heart. If you have been diagnosed with a heart condition, your doctor may recommend exercise to help stabilize your condition. To help make exercise a habit, choose safe, fun activities.       Be sure to check with your health care provider before starting an exercise program.    Why exercise?  Exercising regularly offers many healthy rewards. It can help you do all of the following:    Improve your blood cholesterol levels to help prevent further heart trouble    Lower your blood pressure to help prevent a stroke or heart attack    Control diabetes, or reduce your risk of getting this disease    Improve your heart and lung function    Reach and maintain a healthy weight    Make your muscles stronger and more limber so you can stay active    Prevent falls and fractures by slowing the loss of bone mass (osteoporosis)    Manage stress better  Exercise tips  Ease into your routine. Set small goals. Then build on them.  Exercise on most days. Aim for a total of 150 or more minutes of moderate to  vigorous intensity activity each week. Consider 40 minutes, 3 to 4 times a week. For best results, activity should last for 40 minutes on average. It is OK to work up to the 40 minute period over time. Examples of moderate-intensity activity is walking one mile in 15 minutes or 30 to 45 minutes of yard work.  Step up your daily activity level. Along with your exercise program,  try being more active throughout the day. Walk instead of drive. Do more household tasks or yard work.  Choose one or more activities you enjoy. Walking is one of the easiest things you can do. You can also try swimming, riding a bike, or taking an exercise class.  Stop exercising and call your doctor if you:    Have chest pain or feel dizzy or lightheaded    Feel burning, tightness, pressure, or heaviness in your chest, neck, shoulders, back, or arms    Have unusual shortness of breath    Have increased joint or muscle pain    Have palpitations or an irregular heartbeat      2397-9460 [employer]. [address] [place] [address]. All rights reserved. This information is not intended as a substitute for professional medical care. Always follow your healthcare professional's instructions.         Patient Education   Understanding Ecin[employer] MyPlate  The USDA (US Department of Agriculture) has guidelines to help you make healthy food choices. These are called MyPlate. MyPlate shows the food groups that make up healthy meals using the image of a place setting. Before you eat, think about the healthiest choices for what to put onto your plate or into your cup or bowl. To learn more about building a healthy plate, visit www.choosemyplate.gov.       The Food Groups    Fruits: Any fruit or 100% fruit juice counts as part of the Fruit Group. Fruits may be fresh, canned, frozen, or dried, and may be whole, cut-up, or pureed. Make half your plate fruits and vegetables.    Vegetables: Any vegetable or 100% vegetable juice counts as a member of the Vegetable Group. Vegetables may be fresh, frozen, canned, or dried. They can be served raw or cooked and may be whole, cut-up, or mashed. Make half your plate fruits and vegetables.     Grains: All foods made from grains are part of the Grains Group. These include wheat, rice, oats, cornmeal, and barley such as bread, pasta, oatmeal, cereal, tortillas, and grits.  Grains should be no more than a quarter of your plate. At least half of your grains should be whole grains.    Protein: This group includes meat, poultry, seafood, beans and peas, eggs, processed soy products (like tofu), nuts (including nut butters), and seeds. Make protein choices no more than a quarter of your plate. Meat and poultry choices should be lean or low fat.    Dairy: All fluid milk products and foods made from milk that contain calcium, like yogurt and cheese are part of the Dairy Group. (Foods that have little calcium, such as cream, butter, and cream cheese, are not part of the group.) Most dairy choices should be low-fat or fat-free.    Oils: These are fats that are liquid at room temperature. They include canola, corn, olive, soybean, and sunflower oil. Foods that are mainly oil include mayonnaise, certain salad dressings, and soft margarines. You should have only 5 to 7 teaspoons of oils a day. You probably already get this much from the food you eat.  Use [employer] to Help Build Your Meals  The [employer] can help you plan and track your meals and activity. You can look up individual foods to see or compare their nutritional value. You can get guidelines for what and how much you should eat. You can compare your food choices. And you can assess personal physical activities and see ways you can improve. Go to www.[employer] (P).gov/supertracker/.    7279-6162 [employer]. [address] [place] [address], [place] [address]. All rights reserved. This information is not intended as a substitute for professional medical care. Always follow your healthcare professional's instructions.           Patient Education   Activities of Daily Living  Your Health Risk Assessment indicates you have difficulties with activities of daily living such as eating, getting dressed, grooming, bathing, walking, or using the toilet. Please make a follow up appointment for us to address this issue in more detail.      Patient Education   Instrumental Activities of Daily Living  Your Health Risk Assessment indicates you have difficulties with instrumental activities of daily living which include laundry, housekeeping, banking, shopping, using the telephone, food preparation, transportation, or taking your own medications. Please make a follow up appointment for us to address this issue in more detail.    [employer] has resources available on the following website: https://www.healthBgifty.org/caregivers.html     Also, here is a local agency that provides help with meals and other assistance:   [facility] Line: [phone]     Patient Education   Signs of Hearing Loss  Hearing loss is a problem shared by many people. In fact, it is one of the most common health conditions, particularly as people age. Most people over age 65 have some hearing loss, and by age 80, almost everyone does. Because hearing loss usually occurs slowly over the years, you may not realize your hearing ability has gotten worse.       Have your hearing checked  Contact your [facility] care provider if you:    Have to strain to hear normal conversation.    Have to watch other peoples faces very carefully to follow what theyre saying.    Need to ask people to repeat what theyve said.    Often misunderstand what people are saying.    Turn the volume of the television or radio up so high that others complain.    Feel that people are mumbling when theyre talking to you.    Find that the effort to hear leaves you feeling tired and irritated.    Notice, when using the phone, that you hear better with 1 ear than the other.    8702-7088 [employer]. [address] [place] [address], [place] [address]. All rights reserved. This information is not intended as a substitute for professional medical care. Always follow your healthcare professional's instructions.         Patient Education   Your Health Risk Assessment indicates you feel you are not in good emotional  health.    Recreation   Recreation is not limited to sports and team events. It includes any activity that provides relaxation, interest, enjoyment, and exercise. Recreation provides an outlet for physical, mental, and social energy. It can give a sense of worth and achievement. It can help you stay healthy.    Mental Exercise and Social Involvement  Mental and emotional health is as important as physical health. Keep in touch with friends and family. Stay as active as possible. Continue to learn and challenge yourself.   Things you can do to stay mentally active are:    Learn something new, like a foreign language or musical instrument.     Play SCRABBLE or do crossword puzzles. If you cannot find people to play these games with you at home, you can play them with others on your computer through the Internet.     Join a games club--anything from card games to chess or checkers or lawn bowling.     Start a new hobby.     Go back to school.     Volunteer.     Read.     Keep up with world events.       Patient Education   Preventing Falls in the Home  As you get older, falls are more likely. Thats because your reaction time slows. Your muscles and joints may also get stiffer, making them less flexible. Illness, medications, and vision changes can also affect your balance. A fall could leave you unable to live on your own. To make your home safer, follow these tips:    Floors    Put nonskid pads under area rugs.    Remove throw rugs.    Replace worn floor coverings.    Tack carpets firmly to each step on carpeted stairs. Put nonskid strips on the edges of uncarpeted stairs.    Keep floors and stairs free of clutter and cords.    Arrange furniture so there are clear pathways.    Clean up any spills right away.    Bathrooms    Install grab bars in the tub or shower.    Apply nonskid strips or put a nonskid rubber mat in the tub or shower.    Sit on a bath chair to bathe.    Use bathmats with nonskid  backing.    Lighting    Keep a flashlight in each room.    Put a nightlight along the pathway between the bedroom and the bathroom.    4460-9042 [employer]. [address] [place] [address], [place] [address]. All rights reserved. This information is not intended as a substitute for professional medical care. Always follow your healthcare professional's instructions.           Advance Directive  Patients advance directive was discussed and I am comfortable with the patients wishes.  Patient Education   Personalized Prevention Plan  You are due for the preventive services outlined below.  Your care team is available to assist you in scheduling these services.  If you have already completed any of these items, please share that information with your care team to update in your medical record.  Health Maintenance Due   Topic Date Due   ? HEPATITIS C SCREENING  Never done   ? ZOSTER VACCINES (2 of 3) 06/07/2012   ? DIABETIC FOOT EXAM  10/05/2018   ? LIPID  10/05/2018   ? DEXA SCAN  08/22/2019   ? Pneumococcal Vaccine: Pediatrics (0 to 5 Years) and At-Risk Patients (6 to 64 Years) (2 of 2) 08/03/2020   ? Pneumococcal Vaccine: 65+ Years (2 of 2) 08/03/2020

## 2021-08-26 ENCOUNTER — LAB REQUISITION (OUTPATIENT)
Dept: LAB | Facility: CLINIC | Age: 71
End: 2021-08-26
Payer: COMMERCIAL

## 2021-08-26 DIAGNOSIS — E03.9 HYPOTHYROIDISM, UNSPECIFIED: ICD-10-CM

## 2021-08-27 LAB — TSH SERPL DL<=0.005 MIU/L-ACNC: 0.79 UIU/ML (ref 0.3–5)

## 2021-08-27 PROCEDURE — 36415 COLL VENOUS BLD VENIPUNCTURE: CPT | Mod: ORL | Performed by: INTERNAL MEDICINE

## 2021-08-27 PROCEDURE — 84443 ASSAY THYROID STIM HORMONE: CPT | Mod: ORL | Performed by: INTERNAL MEDICINE

## 2021-08-27 PROCEDURE — P9604 ONE-WAY ALLOW PRORATED TRIP: HCPCS | Mod: ORL | Performed by: INTERNAL MEDICINE

## 2021-09-13 ENCOUNTER — TRANSFERRED RECORDS (OUTPATIENT)
Dept: HEALTH INFORMATION MANAGEMENT | Facility: CLINIC | Age: 71
End: 2021-09-13

## 2021-10-10 ENCOUNTER — LAB REQUISITION (OUTPATIENT)
Dept: LAB | Facility: CLINIC | Age: 71
End: 2021-10-10
Payer: COMMERCIAL

## 2021-10-10 ENCOUNTER — HEALTH MAINTENANCE LETTER (OUTPATIENT)
Age: 71
End: 2021-10-10

## 2021-10-10 DIAGNOSIS — R30.0 DYSURIA: ICD-10-CM

## 2021-10-10 LAB
ALBUMIN UR-MCNC: 10 MG/DL
APPEARANCE UR: ABNORMAL
BACTERIA #/AREA URNS HPF: ABNORMAL /HPF
BILIRUB UR QL STRIP: NEGATIVE
CAOX CRY #/AREA URNS HPF: ABNORMAL /HPF
COLOR UR AUTO: YELLOW
GLUCOSE UR STRIP-MCNC: 200 MG/DL
HGB UR QL STRIP: ABNORMAL
KETONES UR STRIP-MCNC: NEGATIVE MG/DL
LEUKOCYTE ESTERASE UR QL STRIP: ABNORMAL
MUCOUS THREADS #/AREA URNS LPF: PRESENT /LPF
NITRATE UR QL: NEGATIVE
PH UR STRIP: 6 [PH] (ref 5–7)
RBC URINE: 13 /HPF
SP GR UR STRIP: 1.01 (ref 1–1.03)
SQUAMOUS EPITHELIAL: 5 /HPF
TRANSITIONAL EPI: 1 /HPF
UROBILINOGEN UR STRIP-MCNC: <2 MG/DL
WBC CLUMPS #/AREA URNS HPF: PRESENT /HPF
WBC URINE: 64 /HPF

## 2021-10-10 PROCEDURE — 87086 URINE CULTURE/COLONY COUNT: CPT | Mod: ORL | Performed by: INTERNAL MEDICINE

## 2021-10-10 PROCEDURE — 81001 URINALYSIS AUTO W/SCOPE: CPT | Mod: ORL | Performed by: INTERNAL MEDICINE

## 2021-10-11 LAB — BACTERIA UR CULT: NORMAL

## 2021-10-20 ENCOUNTER — LAB REQUISITION (OUTPATIENT)
Dept: LAB | Facility: CLINIC | Age: 71
End: 2021-10-20
Payer: COMMERCIAL

## 2021-10-20 DIAGNOSIS — I10 ESSENTIAL (PRIMARY) HYPERTENSION: ICD-10-CM

## 2021-10-21 LAB
ANION GAP SERPL CALCULATED.3IONS-SCNC: 13 MMOL/L (ref 5–18)
BUN SERPL-MCNC: 16 MG/DL (ref 8–28)
CALCIUM SERPL-MCNC: 9.8 MG/DL (ref 8.5–10.5)
CHLORIDE BLD-SCNC: 98 MMOL/L (ref 98–107)
CO2 SERPL-SCNC: 24 MMOL/L (ref 22–31)
CREAT SERPL-MCNC: 0.83 MG/DL (ref 0.6–1.1)
GFR SERPL CREATININE-BSD FRML MDRD: 70 ML/MIN/1.73M2
GLUCOSE BLD-MCNC: 122 MG/DL (ref 70–125)
POTASSIUM BLD-SCNC: 4.2 MMOL/L (ref 3.5–5)
SODIUM SERPL-SCNC: 135 MMOL/L (ref 136–145)

## 2021-10-21 PROCEDURE — 36415 COLL VENOUS BLD VENIPUNCTURE: CPT | Mod: ORL | Performed by: INTERNAL MEDICINE

## 2021-10-21 PROCEDURE — P9604 ONE-WAY ALLOW PRORATED TRIP: HCPCS | Mod: ORL | Performed by: INTERNAL MEDICINE

## 2021-10-21 PROCEDURE — 80048 BASIC METABOLIC PNL TOTAL CA: CPT | Mod: ORL | Performed by: INTERNAL MEDICINE

## 2021-11-08 ENCOUNTER — TELEPHONE (OUTPATIENT)
Dept: NEUROLOGY | Facility: CLINIC | Age: 71
End: 2021-11-08
Payer: COMMERCIAL

## 2021-11-08 NOTE — TELEPHONE ENCOUNTER
Spoke to Siena, scheduled follow up on 11/19/21.    Rosemary Wright LPN on 11/8/2021 at 11:17 AM

## 2021-11-08 NOTE — TELEPHONE ENCOUNTER
Pt's granddaughter Siena called to inquire about a follow up visit. Pt's EEG was rescheduled for Nov 10. Pt had a F/U set for 11-9. When can pt be seen ? Next available is not until March.  [phone]

## 2021-11-09 ENCOUNTER — LAB REQUISITION (OUTPATIENT)
Dept: LAB | Facility: CLINIC | Age: 71
End: 2021-11-09
Payer: COMMERCIAL

## 2021-11-09 DIAGNOSIS — R39.9 UNSPECIFIED SYMPTOMS AND SIGNS INVOLVING THE GENITOURINARY SYSTEM: ICD-10-CM

## 2021-11-10 ENCOUNTER — HOSPITAL ENCOUNTER (OUTPATIENT)
Dept: NEUROLOGY | Facility: CLINIC | Age: 71
Discharge: HOME | End: 2021-11-10
Attending: PSYCHIATRY & NEUROLOGY | Admitting: PSYCHIATRY & NEUROLOGY
Payer: COMMERCIAL

## 2021-11-10 DIAGNOSIS — R41.3 MEMORY LOSS: ICD-10-CM

## 2021-11-10 DIAGNOSIS — F02.80 LATE ONSET ALZHEIMER'S DISEASE WITHOUT BEHAVIORAL DISTURBANCE (H): ICD-10-CM

## 2021-11-10 DIAGNOSIS — G30.1 LATE ONSET ALZHEIMER'S DISEASE WITHOUT BEHAVIORAL DISTURBANCE (H): ICD-10-CM

## 2021-11-10 PROCEDURE — 95816 EEG AWAKE AND DROWSY: CPT | Mod: 26 | Performed by: PSYCHIATRY & NEUROLOGY

## 2021-11-10 PROCEDURE — 95816 EEG AWAKE AND DROWSY: CPT

## 2021-11-19 ENCOUNTER — OFFICE VISIT (OUTPATIENT)
Dept: NEUROLOGY | Facility: CLINIC | Age: 71
End: 2021-11-19
Payer: COMMERCIAL

## 2021-11-19 VITALS
BODY MASS INDEX: 24.29 KG/M2 | SYSTOLIC BLOOD PRESSURE: 143 MMHG | DIASTOLIC BLOOD PRESSURE: 62 MMHG | HEART RATE: 63 BPM | WEIGHT: 132 LBS | HEIGHT: 62 IN

## 2021-11-19 DIAGNOSIS — F02.80 LATE ONSET ALZHEIMER'S DISEASE WITHOUT BEHAVIORAL DISTURBANCE (H): Primary | ICD-10-CM

## 2021-11-19 DIAGNOSIS — G30.1 LATE ONSET ALZHEIMER'S DISEASE WITHOUT BEHAVIORAL DISTURBANCE (H): Primary | ICD-10-CM

## 2021-11-19 PROCEDURE — 99214 OFFICE O/P EST MOD 30 MIN: CPT | Performed by: PSYCHIATRY & NEUROLOGY

## 2021-11-19 RX ORDER — LOPERAMIDE HCL 2 MG
CAPSULE ORAL
COMMUNITY
Start: 2021-09-11

## 2021-11-19 RX ORDER — ESCITALOPRAM OXALATE 5 MG/1
TABLET ORAL
COMMUNITY
Start: 2021-10-21

## 2021-11-19 RX ORDER — SPIRONOLACTONE 25 MG/1
TABLET ORAL
COMMUNITY
Start: 2021-10-25

## 2021-11-19 ASSESSMENT — MIFFLIN-ST. JEOR: SCORE: 1057

## 2021-11-19 NOTE — LETTER
11/19/2021         RE: Courtney Singh  [address] [place] [address]        Dear Colleague,    Thank you for referring your patient, Courtney Singh, to the [facility] [place]. Please see a copy of my visit note below.    In person evaluation  Patient accompanied by her 2 daughters who help participating in history gathering    HPI  3/9/2020, original in person consult  6/22/2021, in person follow-up  11/19/2021, in person evaluation      Chief complaint  Memory loss onset [id number]  Thyroid disease  Mother with history of dementia  Formal neuropsychometric testing October 9, 2019 moderate cerebral dysfunction  Aricept for couple months 5 mg once per day  Aricept increased to 10 mg daily March 9, 2020  Not clear that she stayed on the bigger dose may have had difficulty with decreased appetite weight loss and then somewhere along the line with switch back to 5 mg/day      TSH 12.23 on 6/17/2021   TSH 14.98 on 7/16/2021  TSH 0.79 on 8/27/2021 improved    Rediscussed Alzheimer's disease and its progression  Discussed the abnormal EEG consistent with neurodegenerative disease no seizures  Concerned that the thyroid disease during the summer probably exacerbated though her symptoms and signs  Previously had decreased appetite on higher doses of Aricept  We will stay with the low-dose Namenda not push higher doses  Discussed how symptoms and signs can progress more rapidly now that she is in the later stages of the disease  Patient is fully ambulatory well cared for      Patient living with granddaughter and daughter  Patient's  is in a care center  They are looking into a memory care unit  Power of  has been set up  Living will has been set up  End-of-life issues have been discussed      Patient needs cueing to eat  Does not help with meal prep  Really does not do much cleaning  Needs cues and help with medication  Can get dressed okay without cueing  Rarely  might do some wandering  Does not have vivid dreams no hallucinations  Sleeps okay at night    Currently no heartburn no diarrhea and eating better when being supervised    Since last seen no hospitalizations  No surgeries  No major illnesses  Did not get Covid  Had the Covid vaccine but during maybe got tired after the second dose    Discussed with granddaughter issues above    Patient lives with her  in the house  Patient is no longer driving  Patient does some of the cooking   does the checkbook  She is okay with her activities of daily living  Symptoms started maybe 1 to 2 years ago vague onset asking questions too much  Seems to be forgetful              Past medical history  Alzheimer's type dementia onset 2018 at least  Hypothyroidism  Diabetes  Hypertension hyperlipidemia  GERD  Osteopenia  Anxiety    Habits  Does not smoke    Family history  Mother with Alzheimer's disease  Dad's health unknown  3 sisters relatively healthy without memory loss  Some diabetes in the family      Work-up  CT scan head August 2019 mild small vessel changes and mild volume loss  August 2019 MRI scan verbal report volume loss small vessel changes  Formal neuropsychometric testing October 9, 2019 moderate cerebral dysfunction bitemporal regions consistent with Alzheimer's disease  January 2020 labs  TSH 0.45  Hemoglobin A1c 6.1%  Sodium 136 potassium 4.3 BUN 13 creatinine 0.88 glucose 104, AST 19  B12 502, (4/14/2021)  TSH 12.23 (2021-06-17)  EEG 11/10/2021,FIRDA type pattern  IMPRESSION: This outpatient EEG study is abnormal due to diffuse generalized slowing intermixed with intermittent semirhythmic frontally predominant delta slowing, consistent with moderate to severe nonspecific encephalopathy.  Generalized rhythmic delta activity with frontal predominance might be considered a normal finding in elderly drowsiness. Also, it may represent subcortical dysfunction secondary to vascular disease and/or other  degenerative conditions.  No interictal epileptiform discharges, no electrographic or clinical seizures, and no paroxysmal behavioral events are seen during the study.    [place]  March 9, 2020 17+1,      18 out of 30   June 22, 2021, 7+1,         8 out of 30      Exam    Review of systems    No headache no chest pain or shortness of breath no nausea vomiting no diarrhea no fever chills    No GERD  No blood in the stool  No black tarry stool  No diarrhea  Needs cueing to eat but is not losing weight further  Is on the lower dose of the Aricept    Memory is rapidly declining  No lightheadedness or fainting or falls pulse is 63    No diplopia dysarthria dysphagia  No focal weakness  No ataxia    Otherwise review of systems negative    General exam  Vital signs documented chart reviewed blood pressure 143/62, pulse 63  Alert and attentive  HEENT normal  Lungs clear  Heart rate regular  Abdomen soft positive bowel sounds  Symmetrical pulses  No edema in the feet    Neurologic exam  Alert attentive  Prosody speech normal  Naming normal  Repetition normal  Comprehension normal  No aphasia  No neglect  MoCA 8 out of 30 (6/22/2021)    Cranials 2 through 12 normal  No ophthalmoplegia  No nystagmus  Visual fields intact  Face symmetrical  Tongue twisters good    Upper extremities  No drift no tremor normal finger-nose good dexterity    Lower extremities  Distal proximal strength good    Gait  Gets up from the chair ambulates without difficulty            Assessment/Plan     1.  Alzheimer disease (G30.1) Alzheimer's type dementia        Aricept 5 mg once per day mg once per day discussed side effects and benefits        had some difficulty with poor appetite better now         when living with the daughter and granddaughter and now on a lower dose of Aricept also has some borderline bradycardia         B12 502 (4/14/2021)         TSH 12.23 (6/17/2021), improved 0.79 August 27, 2021         EEG, FIRDA, consistent with  neurodegenerative disease    Living will discussed   Power of  discussed  End-of-life issues discussed  They have looked into memory care unit    Aricept 5 mg once per day  Namenda 5 mg twice daily    Discussed new medication is coming out do not think that is a good choice has some risk of intracranial hemorrhage    Total care time today 32 minutes              Again, thank you for allowing me to participate in the care of your patient.        Sincerely,        Marshal Cai MD

## 2021-11-19 NOTE — PROGRESS NOTES
In person evaluation  Patient accompanied by her 2 daughters who help participating in history gathering    HPI  3/9/2020, original in person consult  6/22/2021, in person follow-up  11/19/2021, in person evaluation      Chief complaint  Memory loss onset [id number]  Thyroid disease  Mother with history of dementia  Formal neuropsychometric testing October 9, 2019 moderate cerebral dysfunction  Aricept for couple months 5 mg once per day  Aricept increased to 10 mg daily March 9, 2020  Not clear that she stayed on the bigger dose may have had difficulty with decreased appetite weight loss and then somewhere along the line with switch back to 5 mg/day      TSH 12.23 on 6/17/2021   TSH 14.98 on 7/16/2021  TSH 0.79 on 8/27/2021 improved    Rediscussed Alzheimer's disease and its progression  Discussed the abnormal EEG consistent with neurodegenerative disease no seizures  Concerned that the thyroid disease during the summer probably exacerbated though her symptoms and signs  Previously had decreased appetite on higher doses of Aricept  We will stay with the low-dose Namenda not push higher doses  Discussed how symptoms and signs can progress more rapidly now that she is in the later stages of the disease  Patient is fully ambulatory well cared for      Patient living with granddaughter and daughter  Patient's  is in a care center  They are looking into a memory care unit  Power of  has been set up  Living will has been set up  End-of-life issues have been discussed      Patient needs cueing to eat  Does not help with meal prep  Really does not do much cleaning  Needs cues and help with medication  Can get dressed okay without cueing  Rarely might do some wandering  Does not have vivid dreams no hallucinations  Sleeps okay at night    Currently no heartburn no diarrhea and eating better when being supervised    Since last seen no hospitalizations  No surgeries  No major illnesses  Did not get Covid  Had  the Covid vaccine but during maybe got tired after the second dose    Discussed with granddaughter issues above    Patient lives with her  in the house  Patient is no longer driving  Patient does some of the cooking   does the checkbook  She is okay with her activities of daily living  Symptoms started maybe 1 to 2 years ago vague onset asking questions too much  Seems to be forgetful              Past medical history  Alzheimer's type dementia onset 2018 at least  Hypothyroidism  Diabetes  Hypertension hyperlipidemia  GERD  Osteopenia  Anxiety    Habits  Does not smoke    Family history  Mother with Alzheimer's disease  Dad's health unknown  3 sisters relatively healthy without memory loss  Some diabetes in the family      Work-up  CT scan head August 2019 mild small vessel changes and mild volume loss  August 2019 MRI scan verbal report volume loss small vessel changes  Formal neuropsychometric testing October 9, 2019 moderate cerebral dysfunction bitemporal regions consistent with Alzheimer's disease  January 2020 labs  TSH 0.45  Hemoglobin A1c 6.1%  Sodium 136 potassium 4.3 BUN 13 creatinine 0.88 glucose 104, AST 19  B12 502, (4/14/2021)  TSH 12.23 (2021-06-17)  EEG 11/10/2021,FIRDA type pattern  IMPRESSION: This outpatient EEG study is abnormal due to diffuse generalized slowing intermixed with intermittent semirhythmic frontally predominant delta slowing, consistent with moderate to severe nonspecific encephalopathy.  Generalized rhythmic delta activity with frontal predominance might be considered a normal finding in elderly drowsiness. Also, it may represent subcortical dysfunction secondary to vascular disease and/or other degenerative conditions.  No interictal epileptiform discharges, no electrographic or clinical seizures, and no paroxysmal behavioral events are seen during the study.    Alma  March 9, 2020 17+1,      18 out of 30   June 22, 2021, 7+1,         8 out of  30      Exam    Review of systems    No headache no chest pain or shortness of breath no nausea vomiting no diarrhea no fever chills    No GERD  No blood in the stool  No black tarry stool  No diarrhea  Needs cueing to eat but is not losing weight further  Is on the lower dose of the Aricept    Memory is rapidly declining  No lightheadedness or fainting or falls pulse is 63    No diplopia dysarthria dysphagia  No focal weakness  No ataxia    Otherwise review of systems negative    General exam  Vital signs documented chart reviewed blood pressure 143/62, pulse 63  Alert and attentive  HEENT normal  Lungs clear  Heart rate regular  Abdomen soft positive bowel sounds  Symmetrical pulses  No edema in the feet    Neurologic exam  Alert attentive  Prosody speech normal  Naming normal  Repetition normal  Comprehension normal  No aphasia  No neglect  MoCA 8 out of 30 (6/22/2021)    Cranials 2 through 12 normal  No ophthalmoplegia  No nystagmus  Visual fields intact  Face symmetrical  Tongue twisters good    Upper extremities  No drift no tremor normal finger-nose good dexterity    Lower extremities  Distal proximal strength good    Gait  Gets up from the chair ambulates without difficulty            Assessment/Plan     1.  Alzheimer disease (G30.1) Alzheimer's type dementia        Aricept 5 mg once per day mg once per day discussed side effects and benefits        had some difficulty with poor appetite better now         when living with the daughter and granddaughter and now on a lower dose of Aricept also has some borderline bradycardia         B12 502 (4/14/2021)         TSH 12.23 (6/17/2021), improved 0.79 August 27, 2021         EEG, FIRDA, consistent with neurodegenerative disease    Living will discussed   Power of  discussed  End-of-life issues discussed  They have looked into memory care unit    Aricept 5 mg once per day  Namenda 5 mg twice daily    Discussed new medication is coming out do not think that  is a good choice has some risk of intracranial hemorrhage    Total care time today 32 minutes

## 2021-12-06 ENCOUNTER — LAB REQUISITION (OUTPATIENT)
Dept: LAB | Facility: CLINIC | Age: 71
End: 2021-12-06
Payer: COMMERCIAL

## 2021-12-06 DIAGNOSIS — R30.0 DYSURIA: ICD-10-CM

## 2021-12-06 LAB
ALBUMIN UR-MCNC: NEGATIVE MG/DL
APPEARANCE UR: CLEAR
BACTERIA #/AREA URNS HPF: ABNORMAL /HPF
BILIRUB UR QL STRIP: NEGATIVE
COLOR UR AUTO: COLORLESS
GLUCOSE UR STRIP-MCNC: NEGATIVE MG/DL
HGB UR QL STRIP: ABNORMAL
KETONES UR STRIP-MCNC: NEGATIVE MG/DL
LEUKOCYTE ESTERASE UR QL STRIP: ABNORMAL
NITRATE UR QL: NEGATIVE
PH UR STRIP: 6.5 [PH] (ref 5–7)
RBC URINE: 2 /HPF
SP GR UR STRIP: 1.01 (ref 1–1.03)
SQUAMOUS EPITHELIAL: 3 /HPF
TRANSITIONAL EPI: 1 /HPF
UROBILINOGEN UR STRIP-MCNC: <2 MG/DL
WBC URINE: 5 /HPF

## 2021-12-06 PROCEDURE — 87086 URINE CULTURE/COLONY COUNT: CPT | Mod: ORL | Performed by: INTERNAL MEDICINE

## 2021-12-06 PROCEDURE — 81001 URINALYSIS AUTO W/SCOPE: CPT | Mod: ORL | Performed by: INTERNAL MEDICINE

## 2021-12-08 LAB — BACTERIA UR CULT: ABNORMAL

## 2021-12-25 DIAGNOSIS — F03.90: ICD-10-CM

## 2021-12-28 RX ORDER — MEMANTINE HYDROCHLORIDE 5 MG/1
TABLET ORAL
Qty: 62 TABLET | Refills: 6 | Status: SHIPPED | OUTPATIENT
Start: 2021-12-28

## 2021-12-28 NOTE — TELEPHONE ENCOUNTER
"Last Written Prescription Date:  09/08/2021  Last Fill Quantity: 60,  # refills: 3   Last office visit provider:   07/09/2021 with Dr Dimas.    Requested Prescriptions   Pending Prescriptions Disp Refills     memantine (NAMENDA) 5 MG tablet [Pharmacy Med Name: MEMANTINE HCL 5MG TAB] 62 tablet 11     Sig: TAKE 1 TABLET BY MOUTH TWO TIMES A DAY       Miscellaneous Dementia Agents Passed - 12/25/2021 12:12 PM        Passed - Recent (12 mo) or future (30 days) visit within the authorizing provider's specialty     Patient has had an office visit with the authorizing provider or a provider within the authorizing providers department within the previous 12 mos or has a future within next 30 days. See \"Patient Info\" tab in inbasket, or \"Choose Columns\" in Meds & Orders section of the refill encounter.              Passed - Medication is active on med list        Passed - Patient is 18 years of age or older             Yin Rivera 12/28/21 3:28 PM  "

## 2022-01-30 ENCOUNTER — HEALTH MAINTENANCE LETTER (OUTPATIENT)
Age: 72
End: 2022-01-30

## 2022-02-17 PROBLEM — F03.90: Status: ACTIVE | Noted: 2020-08-10

## 2022-05-20 ENCOUNTER — LAB REQUISITION (OUTPATIENT)
Dept: LAB | Facility: CLINIC | Age: 72
End: 2022-05-20
Payer: COMMERCIAL

## 2022-05-20 DIAGNOSIS — F03.90 UNSPECIFIED DEMENTIA WITHOUT BEHAVIORAL DISTURBANCE: ICD-10-CM

## 2022-05-20 DIAGNOSIS — E11.9 TYPE 2 DIABETES MELLITUS WITHOUT COMPLICATIONS (H): ICD-10-CM

## 2022-05-21 ENCOUNTER — LAB REQUISITION (OUTPATIENT)
Dept: LAB | Facility: CLINIC | Age: 72
End: 2022-05-21
Payer: COMMERCIAL

## 2022-05-21 DIAGNOSIS — E11.9 TYPE 2 DIABETES MELLITUS WITHOUT COMPLICATIONS (H): ICD-10-CM

## 2022-05-23 LAB
ALBUMIN SERPL-MCNC: 3.3 G/DL (ref 3.5–5)
ALP SERPL-CCNC: 75 U/L (ref 45–120)
ALT SERPL W P-5'-P-CCNC: 18 U/L (ref 0–45)
ANION GAP SERPL CALCULATED.3IONS-SCNC: 6 MMOL/L (ref 5–18)
AST SERPL W P-5'-P-CCNC: 20 U/L (ref 0–40)
BASOPHILS # BLD AUTO: 0 10E3/UL (ref 0–0.2)
BASOPHILS NFR BLD AUTO: 1 %
BILIRUB SERPL-MCNC: 0.3 MG/DL (ref 0–1)
BUN SERPL-MCNC: 37 MG/DL (ref 8–28)
CALCIUM SERPL-MCNC: 9.6 MG/DL (ref 8.5–10.5)
CHLORIDE BLD-SCNC: 104 MMOL/L (ref 98–107)
CO2 SERPL-SCNC: 28 MMOL/L (ref 22–31)
CREAT SERPL-MCNC: 1.04 MG/DL (ref 0.6–1.1)
EOSINOPHIL # BLD AUTO: 0.2 10E3/UL (ref 0–0.7)
EOSINOPHIL NFR BLD AUTO: 3 %
ERYTHROCYTE [DISTWIDTH] IN BLOOD BY AUTOMATED COUNT: 11.9 % (ref 10–15)
GFR SERPL CREATININE-BSD FRML MDRD: 56 ML/MIN/1.73M2
GLUCOSE BLD-MCNC: 183 MG/DL (ref 70–125)
HBA1C MFR BLD: 6.8 %
HCT VFR BLD AUTO: 36.6 % (ref 35–47)
HGB BLD-MCNC: 11.3 G/DL (ref 11.7–15.7)
IMM GRANULOCYTES # BLD: 0 10E3/UL
IMM GRANULOCYTES NFR BLD: 0 %
LYMPHOCYTES # BLD AUTO: 1.5 10E3/UL (ref 0.8–5.3)
LYMPHOCYTES NFR BLD AUTO: 28 %
MCH RBC QN AUTO: 30.3 PG (ref 26.5–33)
MCHC RBC AUTO-ENTMCNC: 30.9 G/DL (ref 31.5–36.5)
MCV RBC AUTO: 98 FL (ref 78–100)
MONOCYTES # BLD AUTO: 0.7 10E3/UL (ref 0–1.3)
MONOCYTES NFR BLD AUTO: 14 %
NEUTROPHILS # BLD AUTO: 2.9 10E3/UL (ref 1.6–8.3)
NEUTROPHILS NFR BLD AUTO: 54 %
NRBC # BLD AUTO: 0 10E3/UL
NRBC BLD AUTO-RTO: 0 /100
PLATELET # BLD AUTO: 261 10E3/UL (ref 150–450)
POTASSIUM BLD-SCNC: 4.9 MMOL/L (ref 3.5–5)
PROT SERPL-MCNC: 6.9 G/DL (ref 6–8)
RBC # BLD AUTO: 3.73 10E6/UL (ref 3.8–5.2)
SODIUM SERPL-SCNC: 138 MMOL/L (ref 136–145)
TSH SERPL DL<=0.005 MIU/L-ACNC: 0.02 UIU/ML (ref 0.3–5)
VIT B12 SERPL-MCNC: 517 PG/ML (ref 213–816)
WBC # BLD AUTO: 5.2 10E3/UL (ref 4–11)

## 2022-05-23 PROCEDURE — 82607 VITAMIN B-12: CPT | Mod: ORL | Performed by: INTERNAL MEDICINE

## 2022-05-23 PROCEDURE — 85025 COMPLETE CBC W/AUTO DIFF WBC: CPT | Mod: ORL | Performed by: INTERNAL MEDICINE

## 2022-05-23 PROCEDURE — 80053 COMPREHEN METABOLIC PANEL: CPT | Mod: ORL | Performed by: INTERNAL MEDICINE

## 2022-05-23 PROCEDURE — 83036 HEMOGLOBIN GLYCOSYLATED A1C: CPT | Mod: ORL | Performed by: INTERNAL MEDICINE

## 2022-05-23 PROCEDURE — P9604 ONE-WAY ALLOW PRORATED TRIP: HCPCS | Mod: ORL | Performed by: INTERNAL MEDICINE

## 2022-05-23 PROCEDURE — 84443 ASSAY THYROID STIM HORMONE: CPT | Mod: ORL | Performed by: INTERNAL MEDICINE

## 2022-05-23 PROCEDURE — 36415 COLL VENOUS BLD VENIPUNCTURE: CPT | Mod: ORL | Performed by: INTERNAL MEDICINE

## 2022-06-20 DIAGNOSIS — M81.0 AGE RELATED OSTEOPOROSIS, UNSPECIFIED PATHOLOGICAL FRACTURE PRESENCE: Primary | ICD-10-CM

## 2022-06-21 RX ORDER — CHOLECALCIFEROL (VITAMIN D3) 50 MCG
TABLET ORAL
Qty: 30 TABLET | Refills: 11 | Status: SHIPPED | OUTPATIENT
Start: 2022-06-21

## 2022-06-21 NOTE — TELEPHONE ENCOUNTER
"Routing refill request to provider for review/approval because:  Drug not active on patient's medication list    Last Written Prescription Date:    Last Fill Quantity: ,  # refills:    Last office visit provider:  7/9/21     Requested Prescriptions   Pending Prescriptions Disp Refills     vitamin D3 (CHOLECALCIFEROL) 50 mcg (2000 units) tablet [Pharmacy Med Name: VITAMIN D3 2,000 IU TAB] 30 tablet 11     Sig: TAKE 1 TABLET BY MOUTH ONCE DAILY       Vitamin Supplements (Adult) Protocol Failed - 6/20/2022  1:40 PM        Failed - Medication is active on med list        Passed - High dose Vitamin D not ordered        Passed - Recent (12 mo) or future (30 days) visit within the authorizing provider's specialty     Patient has had an office visit with the authorizing provider or a provider within the authorizing providers department within the previous 12 mos or has a future within next 30 days. See \"Patient Info\" tab in inbasket, or \"Choose Columns\" in Meds & Orders section of the refill encounter.                   Leida Ashby RN 06/20/22 9:10 PM  "

## 2022-06-22 ENCOUNTER — LAB REQUISITION (OUTPATIENT)
Dept: LAB | Facility: CLINIC | Age: 72
End: 2022-06-22
Payer: COMMERCIAL

## 2022-06-22 DIAGNOSIS — E03.9 HYPOTHYROIDISM, UNSPECIFIED: ICD-10-CM

## 2022-06-27 LAB — TSH SERPL DL<=0.005 MIU/L-ACNC: 0.39 UIU/ML (ref 0.3–5)

## 2022-06-27 PROCEDURE — 36415 COLL VENOUS BLD VENIPUNCTURE: CPT | Mod: ORL | Performed by: INTERNAL MEDICINE

## 2022-06-27 PROCEDURE — 84443 ASSAY THYROID STIM HORMONE: CPT | Mod: ORL | Performed by: INTERNAL MEDICINE

## 2022-06-27 PROCEDURE — P9604 ONE-WAY ALLOW PRORATED TRIP: HCPCS | Mod: ORL | Performed by: INTERNAL MEDICINE

## 2022-07-17 ENCOUNTER — HEALTH MAINTENANCE LETTER (OUTPATIENT)
Age: 72
End: 2022-07-17

## 2022-09-09 ENCOUNTER — LAB REQUISITION (OUTPATIENT)
Dept: LAB | Facility: CLINIC | Age: 72
End: 2022-09-09
Payer: COMMERCIAL

## 2022-09-09 DIAGNOSIS — E03.9 HYPOTHYROIDISM, UNSPECIFIED: ICD-10-CM

## 2022-09-09 DIAGNOSIS — E11.9 TYPE 2 DIABETES MELLITUS WITHOUT COMPLICATIONS (H): ICD-10-CM

## 2022-09-12 LAB
ANION GAP SERPL CALCULATED.3IONS-SCNC: 9 MMOL/L (ref 7–15)
BUN SERPL-MCNC: 26.9 MG/DL (ref 8–23)
CALCIUM SERPL-MCNC: 9.5 MG/DL (ref 8.8–10.2)
CHLORIDE SERPL-SCNC: 102 MMOL/L (ref 98–107)
CREAT SERPL-MCNC: 1.12 MG/DL (ref 0.51–0.95)
DEPRECATED HCO3 PLAS-SCNC: 27 MMOL/L (ref 22–29)
GFR SERPL CREATININE-BSD FRML MDRD: 51 ML/MIN/1.73M2
GLUCOSE SERPL-MCNC: 135 MG/DL (ref 70–99)
HBA1C MFR BLD: 7.5 %
POTASSIUM SERPL-SCNC: 4.9 MMOL/L (ref 3.4–5.3)
SODIUM SERPL-SCNC: 138 MMOL/L (ref 136–145)
TSH SERPL DL<=0.005 MIU/L-ACNC: 0.6 UIU/ML (ref 0.3–4.2)

## 2022-09-12 PROCEDURE — P9603 ONE-WAY ALLOW PRORATED MILES: HCPCS | Mod: ORL | Performed by: INTERNAL MEDICINE

## 2022-09-12 PROCEDURE — 84443 ASSAY THYROID STIM HORMONE: CPT | Mod: ORL | Performed by: INTERNAL MEDICINE

## 2022-09-12 PROCEDURE — 80048 BASIC METABOLIC PNL TOTAL CA: CPT | Mod: ORL | Performed by: INTERNAL MEDICINE

## 2022-09-12 PROCEDURE — 83036 HEMOGLOBIN GLYCOSYLATED A1C: CPT | Mod: ORL | Performed by: INTERNAL MEDICINE

## 2022-09-12 PROCEDURE — 36415 COLL VENOUS BLD VENIPUNCTURE: CPT | Mod: ORL | Performed by: INTERNAL MEDICINE

## 2022-09-24 ENCOUNTER — HEALTH MAINTENANCE LETTER (OUTPATIENT)
Age: 72
End: 2022-09-24

## 2022-11-02 ENCOUNTER — LAB REQUISITION (OUTPATIENT)
Dept: LAB | Facility: CLINIC | Age: 72
End: 2022-11-02
Payer: COMMERCIAL

## 2022-11-02 DIAGNOSIS — I10 ESSENTIAL (PRIMARY) HYPERTENSION: ICD-10-CM

## 2022-11-07 LAB
ANION GAP SERPL CALCULATED.3IONS-SCNC: 12 MMOL/L (ref 7–15)
BUN SERPL-MCNC: 25 MG/DL (ref 8–23)
CALCIUM SERPL-MCNC: 9.2 MG/DL (ref 8.8–10.2)
CHLORIDE SERPL-SCNC: 102 MMOL/L (ref 98–107)
CREAT SERPL-MCNC: 1.13 MG/DL (ref 0.51–0.95)
DEPRECATED HCO3 PLAS-SCNC: 22 MMOL/L (ref 22–29)
GFR SERPL CREATININE-BSD FRML MDRD: 51 ML/MIN/1.73M2
GLUCOSE SERPL-MCNC: 154 MG/DL (ref 70–99)
POTASSIUM SERPL-SCNC: 4.7 MMOL/L (ref 3.4–5.3)
SODIUM SERPL-SCNC: 136 MMOL/L (ref 136–145)

## 2022-11-07 PROCEDURE — P9604 ONE-WAY ALLOW PRORATED TRIP: HCPCS | Mod: ORL | Performed by: INTERNAL MEDICINE

## 2022-11-07 PROCEDURE — 36415 COLL VENOUS BLD VENIPUNCTURE: CPT | Mod: ORL | Performed by: INTERNAL MEDICINE

## 2022-11-07 PROCEDURE — 80048 BASIC METABOLIC PNL TOTAL CA: CPT | Mod: ORL | Performed by: INTERNAL MEDICINE

## 2023-01-01 ENCOUNTER — HOSPITAL ENCOUNTER (EMERGENCY)
Facility: HOSPITAL | Age: 73
Discharge: HOME | End: 2023-06-25
Attending: FAMILY MEDICINE | Admitting: FAMILY MEDICINE
Payer: COMMERCIAL

## 2023-01-01 ENCOUNTER — LAB REQUISITION (OUTPATIENT)
Dept: LAB | Facility: CLINIC | Age: 73
End: 2023-01-01
Payer: COMMERCIAL

## 2023-01-01 ENCOUNTER — HEALTH MAINTENANCE LETTER (OUTPATIENT)
Age: 73
End: 2023-01-01

## 2023-01-01 ENCOUNTER — APPOINTMENT (OUTPATIENT)
Dept: RADIOLOGY | Facility: HOSPITAL | Age: 73
End: 2023-01-01
Attending: FAMILY MEDICINE
Payer: COMMERCIAL

## 2023-01-01 ENCOUNTER — LAB REQUISITION (OUTPATIENT)
Dept: LAB | Facility: CLINIC | Age: 73
End: 2023-01-01
Payer: OTHER MISCELLANEOUS

## 2023-01-01 ENCOUNTER — TELEPHONE (OUTPATIENT)
Dept: FAMILY MEDICINE | Facility: CLINIC | Age: 73
End: 2023-01-01
Payer: MEDICARE

## 2023-01-01 VITALS
OXYGEN SATURATION: 96 % | DIASTOLIC BLOOD PRESSURE: 73 MMHG | HEART RATE: 70 BPM | SYSTOLIC BLOOD PRESSURE: 165 MMHG | TEMPERATURE: 97.5 F | BODY MASS INDEX: 23.78 KG/M2 | WEIGHT: 130 LBS

## 2023-01-01 DIAGNOSIS — R68.83 CHILLS: ICD-10-CM

## 2023-01-01 DIAGNOSIS — E87.1 HYPONATREMIA: ICD-10-CM

## 2023-01-01 DIAGNOSIS — E83.42 HYPOMAGNESEMIA: ICD-10-CM

## 2023-01-01 DIAGNOSIS — R63.0 DECREASED APPETITE: ICD-10-CM

## 2023-01-01 DIAGNOSIS — E03.9 HYPOTHYROIDISM, UNSPECIFIED: ICD-10-CM

## 2023-01-01 DIAGNOSIS — R39.9 UNSPECIFIED SYMPTOMS AND SIGNS INVOLVING THE GENITOURINARY SYSTEM: ICD-10-CM

## 2023-01-01 LAB
ALBUMIN SERPL BCG-MCNC: 3.8 G/DL (ref 3.5–5.2)
ALBUMIN UR-MCNC: 10 MG/DL
ALBUMIN UR-MCNC: NEGATIVE MG/DL
ALBUMIN UR-MCNC: NEGATIVE MG/DL
ALP SERPL-CCNC: 57 U/L (ref 35–104)
ALT SERPL W P-5'-P-CCNC: 22 U/L (ref 0–50)
ANION GAP SERPL CALCULATED.3IONS-SCNC: 10 MMOL/L (ref 7–15)
APPEARANCE UR: CLEAR
AST SERPL W P-5'-P-CCNC: 37 U/L (ref 0–45)
BACTERIA #/AREA URNS HPF: ABNORMAL /HPF
BACTERIA BLD CULT: NO GROWTH
BACTERIA UR CULT: NORMAL
BASOPHILS # BLD AUTO: 0 10E3/UL (ref 0–0.2)
BASOPHILS NFR BLD AUTO: 0 %
BILIRUB DIRECT SERPL-MCNC: <0.2 MG/DL (ref 0–0.3)
BILIRUB SERPL-MCNC: 0.3 MG/DL
BILIRUB UR QL STRIP: NEGATIVE
BUN SERPL-MCNC: 23.5 MG/DL (ref 8–23)
CALCIUM SERPL-MCNC: 9.2 MG/DL (ref 8.8–10.2)
CHLORIDE SERPL-SCNC: 94 MMOL/L (ref 98–107)
COLOR UR AUTO: ABNORMAL
CREAT SERPL-MCNC: 1.24 MG/DL (ref 0.51–0.95)
DEPRECATED HCO3 PLAS-SCNC: 24 MMOL/L (ref 22–29)
EOSINOPHIL # BLD AUTO: 0.1 10E3/UL (ref 0–0.7)
EOSINOPHIL NFR BLD AUTO: 3 %
ERYTHROCYTE [DISTWIDTH] IN BLOOD BY AUTOMATED COUNT: 11.3 % (ref 10–15)
FLUAV RNA SPEC QL NAA+PROBE: NEGATIVE
FLUBV RNA RESP QL NAA+PROBE: NEGATIVE
GFR SERPL CREATININE-BSD FRML MDRD: 45 ML/MIN/1.73M2
GLUCOSE SERPL-MCNC: 106 MG/DL (ref 70–99)
GLUCOSE UR STRIP-MCNC: NEGATIVE MG/DL
HCT VFR BLD AUTO: 36.4 % (ref 35–47)
HGB BLD-MCNC: 12.5 G/DL (ref 11.7–15.7)
HGB UR QL STRIP: NEGATIVE
HYALINE CASTS: 4 /LPF
IMM GRANULOCYTES # BLD: 0 10E3/UL
IMM GRANULOCYTES NFR BLD: 0 %
KETONES UR STRIP-MCNC: 10 MG/DL
KETONES UR STRIP-MCNC: ABNORMAL MG/DL
KETONES UR STRIP-MCNC: NEGATIVE MG/DL
LEUKOCYTE ESTERASE UR QL STRIP: ABNORMAL
LEUKOCYTE ESTERASE UR QL STRIP: ABNORMAL
LEUKOCYTE ESTERASE UR QL STRIP: NEGATIVE
LYMPHOCYTES # BLD AUTO: 1.5 10E3/UL (ref 0.8–5.3)
LYMPHOCYTES NFR BLD AUTO: 31 %
MAGNESIUM SERPL-MCNC: 1.4 MG/DL (ref 1.7–2.3)
MCH RBC QN AUTO: 32.1 PG (ref 26.5–33)
MCHC RBC AUTO-ENTMCNC: 34.3 G/DL (ref 31.5–36.5)
MCV RBC AUTO: 93 FL (ref 78–100)
MONOCYTES # BLD AUTO: 0.6 10E3/UL (ref 0–1.3)
MONOCYTES NFR BLD AUTO: 14 %
MUCOUS THREADS #/AREA URNS LPF: PRESENT /LPF
NEUTROPHILS # BLD AUTO: 2.4 10E3/UL (ref 1.6–8.3)
NEUTROPHILS NFR BLD AUTO: 52 %
NITRATE UR QL: NEGATIVE
NRBC # BLD AUTO: 0 10E3/UL
NRBC BLD AUTO-RTO: 0 /100
PH UR STRIP: 5.5 [PH] (ref 5–7)
PH UR STRIP: 5.5 [PH] (ref 5–7)
PH UR STRIP: 6 [PH] (ref 5–7)
PLATELET # BLD AUTO: 210 10E3/UL (ref 150–450)
POTASSIUM SERPL-SCNC: 4.6 MMOL/L (ref 3.4–5.3)
PROT SERPL-MCNC: 6.9 G/DL (ref 6.4–8.3)
RBC # BLD AUTO: 3.9 10E6/UL (ref 3.8–5.2)
RBC URINE: 0 /HPF
RBC URINE: <1 /HPF
RBC URINE: <1 /HPF
RSV RNA SPEC NAA+PROBE: NEGATIVE
SARS-COV-2 RNA RESP QL NAA+PROBE: NEGATIVE
SODIUM SERPL-SCNC: 128 MMOL/L (ref 136–145)
SP GR UR STRIP: 1.01 (ref 1–1.03)
T4 FREE SERPL-MCNC: 1.77 NG/DL (ref 0.9–1.7)
TRANSITIONAL EPI: <1 /HPF
TSH SERPL DL<=0.005 MIU/L-ACNC: 0.12 UIU/ML (ref 0.3–4.2)
TSH SERPL DL<=0.005 MIU/L-ACNC: 0.13 UIU/ML (ref 0.3–4.2)
TSH SERPL DL<=0.005 MIU/L-ACNC: 0.5 UIU/ML (ref 0.3–4.2)
UROBILINOGEN UR STRIP-MCNC: <2 MG/DL
UROBILINOGEN UR STRIP-MCNC: NORMAL MG/DL
UROBILINOGEN UR STRIP-MCNC: NORMAL MG/DL
WBC # BLD AUTO: 4.7 10E3/UL (ref 4–11)
WBC URINE: 10 /HPF
WBC URINE: 7 /HPF
WBC URINE: <1 /HPF

## 2023-01-01 PROCEDURE — 81001 URINALYSIS AUTO W/SCOPE: CPT | Mod: ORL | Performed by: INTERNAL MEDICINE

## 2023-01-01 PROCEDURE — 87040 BLOOD CULTURE FOR BACTERIA: CPT | Performed by: FAMILY MEDICINE

## 2023-01-01 PROCEDURE — 83735 ASSAY OF MAGNESIUM: CPT | Performed by: FAMILY MEDICINE

## 2023-01-01 PROCEDURE — 84439 ASSAY OF FREE THYROXINE: CPT | Performed by: FAMILY MEDICINE

## 2023-01-01 PROCEDURE — 36415 COLL VENOUS BLD VENIPUNCTURE: CPT | Mod: ORL | Performed by: INTERNAL MEDICINE

## 2023-01-01 PROCEDURE — 82248 BILIRUBIN DIRECT: CPT | Performed by: FAMILY MEDICINE

## 2023-01-01 PROCEDURE — 36415 COLL VENOUS BLD VENIPUNCTURE: CPT | Performed by: FAMILY MEDICINE

## 2023-01-01 PROCEDURE — 85025 COMPLETE CBC W/AUTO DIFF WBC: CPT | Performed by: FAMILY MEDICINE

## 2023-01-01 PROCEDURE — 81001 URINALYSIS AUTO W/SCOPE: CPT | Performed by: FAMILY MEDICINE

## 2023-01-01 PROCEDURE — 84443 ASSAY THYROID STIM HORMONE: CPT | Performed by: FAMILY MEDICINE

## 2023-01-01 PROCEDURE — 99284 EMERGENCY DEPT VISIT MOD MDM: CPT | Mod: 25

## 2023-01-01 PROCEDURE — 71045 X-RAY EXAM CHEST 1 VIEW: CPT

## 2023-01-01 PROCEDURE — 250N000013 HC RX MED GY IP 250 OP 250 PS 637: Performed by: FAMILY MEDICINE

## 2023-01-01 PROCEDURE — 87637 SARSCOV2&INF A&B&RSV AMP PRB: CPT | Performed by: FAMILY MEDICINE

## 2023-01-01 PROCEDURE — P9604 ONE-WAY ALLOW PRORATED TRIP: HCPCS | Mod: ORL | Performed by: INTERNAL MEDICINE

## 2023-01-01 PROCEDURE — 84443 ASSAY THYROID STIM HORMONE: CPT | Mod: ORL | Performed by: INTERNAL MEDICINE

## 2023-01-01 PROCEDURE — P9603 ONE-WAY ALLOW PRORATED MILES: HCPCS | Mod: ORL | Performed by: INTERNAL MEDICINE

## 2023-01-01 PROCEDURE — 87086 URINE CULTURE/COLONY COUNT: CPT | Mod: ORL | Performed by: INTERNAL MEDICINE

## 2023-01-01 RX ORDER — MAGNESIUM OXIDE 400 MG/1
400 TABLET ORAL ONCE
Status: COMPLETED | OUTPATIENT
Start: 2023-01-01 | End: 2023-01-01

## 2023-01-01 RX ORDER — MAGNESIUM OXIDE 400 MG/1
400 TABLET ORAL DAILY
Qty: 10 TABLET | Refills: 0 | Status: SHIPPED | OUTPATIENT
Start: 2023-01-01 | End: 2023-01-01

## 2023-01-01 RX ORDER — ACETAMINOPHEN 325 MG/1
650 TABLET ORAL ONCE
Status: COMPLETED | OUTPATIENT
Start: 2023-01-01 | End: 2023-01-01

## 2023-01-01 RX ORDER — MAGNESIUM OXIDE 400 MG/1
400 TABLET ORAL DAILY
Qty: 10 TABLET | Refills: 0 | Status: SHIPPED | OUTPATIENT
Start: 2023-01-01

## 2023-01-01 RX ADMIN — MAGNESIUM OXIDE TAB 400 MG (241.3 MG ELEMENTAL MG) 400 MG: 400 (241.3 MG) TAB at 13:15

## 2023-01-01 RX ADMIN — ACETAMINOPHEN 650 MG: 325 TABLET ORAL at 15:30

## 2023-01-01 ASSESSMENT — ACTIVITIES OF DAILY LIVING (ADL)
ADLS_ACUITY_SCORE: 35

## 2023-01-01 ASSESSMENT — ENCOUNTER SYMPTOMS
COUGH: 0
CHILLS: 1
SHORTNESS OF BREATH: 0
DIARRHEA: 0
ABDOMINAL PAIN: 0
VOMITING: 0

## 2023-01-29 ENCOUNTER — HEALTH MAINTENANCE LETTER (OUTPATIENT)
Age: 73
End: 2023-01-29

## 2023-02-23 ENCOUNTER — LAB REQUISITION (OUTPATIENT)
Dept: LAB | Facility: CLINIC | Age: 73
End: 2023-02-23
Payer: COMMERCIAL

## 2023-02-23 DIAGNOSIS — F03.90 UNSPECIFIED DEMENTIA, UNSPECIFIED SEVERITY, WITHOUT BEHAVIORAL DISTURBANCE, PSYCHOTIC DISTURBANCE, MOOD DISTURBANCE, AND ANXIETY (H): ICD-10-CM

## 2023-02-27 LAB
ALBUMIN SERPL BCG-MCNC: 4.3 G/DL (ref 3.5–5.2)
ALP SERPL-CCNC: 68 U/L (ref 35–104)
ALT SERPL W P-5'-P-CCNC: 24 U/L (ref 10–35)
ANION GAP SERPL CALCULATED.3IONS-SCNC: 12 MMOL/L (ref 7–15)
AST SERPL W P-5'-P-CCNC: 35 U/L (ref 10–35)
BILIRUB SERPL-MCNC: 0.3 MG/DL
BUN SERPL-MCNC: 25 MG/DL (ref 8–23)
CALCIUM SERPL-MCNC: 10 MG/DL (ref 8.8–10.2)
CHLORIDE SERPL-SCNC: 103 MMOL/L (ref 98–107)
CREAT SERPL-MCNC: 1.18 MG/DL (ref 0.51–0.95)
DEPRECATED HCO3 PLAS-SCNC: 23 MMOL/L (ref 22–29)
GFR SERPL CREATININE-BSD FRML MDRD: 48 ML/MIN/1.73M2
GLUCOSE SERPL-MCNC: 162 MG/DL (ref 70–99)
HBA1C MFR BLD: 7.8 %
POTASSIUM SERPL-SCNC: 5.2 MMOL/L (ref 3.4–5.3)
PROT SERPL-MCNC: 7.4 G/DL (ref 6.4–8.3)
SODIUM SERPL-SCNC: 138 MMOL/L (ref 136–145)
TSH SERPL DL<=0.005 MIU/L-ACNC: 18.8 UIU/ML (ref 0.3–4.2)
VIT B12 SERPL-MCNC: 788 PG/ML (ref 232–1245)

## 2023-02-27 PROCEDURE — 83036 HEMOGLOBIN GLYCOSYLATED A1C: CPT | Mod: ORL | Performed by: INTERNAL MEDICINE

## 2023-02-27 PROCEDURE — 82607 VITAMIN B-12: CPT | Mod: ORL | Performed by: INTERNAL MEDICINE

## 2023-02-27 PROCEDURE — P9604 ONE-WAY ALLOW PRORATED TRIP: HCPCS | Mod: ORL | Performed by: INTERNAL MEDICINE

## 2023-02-27 PROCEDURE — 84443 ASSAY THYROID STIM HORMONE: CPT | Mod: ORL | Performed by: INTERNAL MEDICINE

## 2023-02-27 PROCEDURE — 80053 COMPREHEN METABOLIC PANEL: CPT | Mod: ORL | Performed by: INTERNAL MEDICINE

## 2023-02-27 PROCEDURE — 36415 COLL VENOUS BLD VENIPUNCTURE: CPT | Mod: ORL | Performed by: INTERNAL MEDICINE

## 2023-06-15 NOTE — TELEPHONE ENCOUNTER
Home Care is calling regarding an established patient with [facility].       Requesting orders from: Tereza Dimas  Provider is following patient: No       Orders Requested    Physical Therapy  Request for initial certification (first set of orders)   Frequency: Eval and Treat d/t recent fall a couple of weeks ago    Occupational Therapy  Request for initial certification (first set of orders)   Frequency: Eval and Treat d/t recent fall a couple of weeks ago    Information was gathered and will be sent to provider for review.  RN will contact Home Care with information after provider review.  Information was gathered and will be sent to provider to confirm provider will be following patient.  RN will contact Home Care with information after provider review.  Confirmed ok to leave a detailed message with call back.  Contact information confirmed and updated as needed.    Patient is currently residing at [facility]. Rae, Physical Therapist assistant, is not sure if Dr Dimas is still the PCP, but is listed as PCP on their facesheet. Patient's last OV with Dr Dimas was 7/9/2021. Will route to Dr Dimas to review and advise on orders and confirm if she will be following pt. Rae is going to check with their onsite nurse as well.    Brady Marie, BSN, RN  [facility]

## 2023-06-15 NOTE — TELEPHONE ENCOUNTER
Left detailed message of provider's message for Rae (no orders given though).     Please assist pt with scheduling AWV with Dr Dimas if she still considers Dr Dimas as her PCP.    Brady Marie, BSN, RN  [facility]

## 2023-06-15 NOTE — TELEPHONE ENCOUNTER
If pt considers me her PCP (has not established with anyone else), then yes I will follow and provide orders. Pt should schedule AWV with me when able.

## 2023-06-25 NOTE — ED PROVIDER NOTES
"EMERGENCY DEPARTMENT ENCOUNTER      NAME: Courtney Singh  AGE: 73 year old female  YOB: 1950  MRN: [id number]  EVALUATION DATE & TIME: 6/25/2023 11:27 AM    PCP: Tereza Dimas    ED PROVIDER: Joce Juares M.D.    Chief Complaint   Patient presents with     Chills     FINAL IMPRESSION:  1. Chills    2. Decreased appetite    3. Hypomagnesemia    4. Hyponatremia        ED COURSE & MEDICAL DECISION MAKING:    Pertinent Labs & Imaging studies independently interpreted by me. (See chart for details)  11:37 AM Patient seen and examined, very few prior records are available but did review prior emergency department note from outside facility on June 16, 2022 patient was seen for an unresponsive episode and found to be hyperglycemic but no other definite etiology found.  Patient presents today with chills and \"failure to thrive\" with decreased appetite for couple of days.  On exam here, she is a poor historian but has no complaints.  No tachycardia, patient is hypertensive but she also was quite upset and so we will monitor this.  No hypoxia, afebrile.  No abdominal tenderness, no lower extremity swelling, no crackles or wheezes on lung exam.  No focal findings today.  Labs including urinalysis, chest x-ray ordered.  Broad testing ordered due to possible infectious etiology of chills and decreased appetite.  12:59 PM labs independently interpreted by me with normal CBC, creatinine 1.24 which is approximately stable for the patient based on prior, magnesium is low at 1.4, sodium 128.  Unfortunately, patient pulled out her IV and refuses for this to be replaced.  Patient be given oral magnesium and encouraged to eat for her hyponatremia.  Remaining labs are pending.  1:37 PM chest x-ray independently interpreted by me is negative.  Waiting for urinalysis which may be difficult to get for this patient.  4:00 PM rechecked.  She has had multiple glasses of water in the emergency department, also is " eating a little bit.  Family is at bedside and we discussed results so far.  Continue to wait for urinalysis.  Posterior pharynx examined, no erythema or evidence for thrush.  5:57 PM Urinalysis independently interpreted by me negative for infection.  Patient presents with chills and decreased appetite.  No fever on exam, no leukocytosis.  No abdominal tenderness or pain to suggest intra-abdominal infection.  Chest x-ray negative and lungs are clear, no evidence for intrathoracic infection such as pneumonia or bronchitis.  Urinalysis without evidence for infection.  Mild electrolyte disturbances including low magnesium and hyponatremia not requiring aggressive treatment and patient pulled out her IV prior to opportunity to fix these problems.  Patient did take oral magnesium in the emergency department, patient is tolerating oral intake in the emergency department including at least 3 glasses of water, and is stable for discharge with outpatient follow-up.    At the conclusion of the encounter I discussed the results of all of the tests and the disposition. The questions were answered. The patient or family acknowledged understanding and was agreeable with the care plan.     Medical Decision Making    History:    Supplemental history from: Documented in chart, if applicable    External Record(s) reviewed: Documented in chart, if applicable.    Work Up:    Chart documentation includes differential considered and any EKGs or imaging independently interpreted by provider, where specified.    In additional to work up documented, I considered the following work up: Documented in chart, if applicable.    External consultation:    Discussion of management with another provider: Documented in chart, if applicable    Complicating factors:    Care impacted by chronic illness: Dementia, Diabetes and Hypertension    Care affected by social determinants of health: N/A    Disposition considerations: Discharge. I prescribed  additional prescription strength medication(s) as charted. I considered admission, but ultimately discharged patient after reassuring emergency department evaluation and tolerating oral intake in the emergency department.    MEDICATIONS GIVEN IN THE EMERGENCY:  Medications   magnesium oxide (MAG-OX) tablet 400 mg (400 mg Oral $Given 6/25/23 1315)   acetaminophen (TYLENOL) tablet 650 mg (650 mg Oral $Given 6/25/23 1530)       NEW PRESCRIPTIONS STARTED AT TODAY'S ER VISIT  New Prescriptions    MAGNESIUM OXIDE (MAG-OX) 400 MG TABLET    Take 1 tablet (400 mg) by mouth daily       =================================================================    HPI    Patient information was obtained from: Patient      Courtney Singh is a 73 year old female with a pertinent history of alzheimer's disease, hypertension, T2DM, hypercholesterolemia, and GERD who presents to this ED via EMS for evaluation of chills.    HPI limited secondary to dementia.    The patient reports chills. No recent falls. Denies cough, shortness of breath, vomiting, diarrhea, abdominal pain, or any other complaints at this time.    REVIEW OF SYSTEMS   Review of Systems   Constitutional: Positive for chills.   Respiratory: Negative for cough and shortness of breath.    Gastrointestinal: Negative for abdominal pain, diarrhea and vomiting.   All other systems reviewed and are negative.     All other systems reviewed and negative    PAST MEDICAL HISTORY:  History reviewed. No pertinent past medical history.    PAST SURGICAL HISTORY:  History reviewed. No pertinent surgical history.    CURRENT MEDICATIONS:    No current facility-administered medications for this encounter.     Current Outpatient Medications   Medication     alendronate (FOSAMAX) 70 MG tablet     atorvastatin (LIPITOR) 20 MG tablet     atorvastatin (LIPITOR) 20 MG tablet     atorvastatin (LIPITOR) 20 MG tablet     blood glucose meter (GLUCOMETER)     blood glucose meter (GLUCOMETER)     blood  glucose test strips     donepezil (ARICEPT) 5 MG tablet     escitalopram (LEXAPRO) 5 MG tablet     famotidine (PEPCID) 20 MG tablet     fluticasone (FLONASE) 50 mcg/actuation nasal spray     generic lancets (FINGERSTIX LANCETS)     levothyroxine (SYNTHROID, LEVOTHROID) 88 MCG tablet     loperamide (IMODIUM) 2 MG capsule     magnesium oxide (MAG-OX) 400 MG tablet     memantine (NAMENDA) 5 MG tablet     montelukast (SINGULAIR) 10 mg tablet     spironolactone (ALDACTONE) 25 MG tablet     vitamin D3 (CHOLECALCIFEROL) 50 mcg (2000 units) tablet       ALLERGIES:  Allergies   Allergen Reactions     Clindamycin Nausea and Vomiting     diarrhea     Levofloxacin Unknown     Ears buzzing     Azithromycin Rash     Sulfa (Sulfonamide Antibiotics) [Sulfa Antibiotics] Rash       FAMILY HISTORY:  History reviewed. No pertinent family history.    SOCIAL HISTORY:   Social History     Socioeconomic History     Marital status:      Number of children: 3   Tobacco Use     Smoking status: Never     Smokeless tobacco: Never   Substance and Sexual Activity     Alcohol use: Not Currently       VITALS:  BP (!) 165/73   Pulse 70   Temp 97.5  F (36.4  C) (Oral)   Wt 59 kg (130 lb)   SpO2 96%   BMI 23.78 kg/m      PHYSICAL EXAM:  Physical Exam  Vitals and nursing note reviewed.   Constitutional:       Appearance: Normal appearance.   HENT:      Head: Normocephalic and atraumatic.      Right Ear: External ear normal.      Left Ear: External ear normal.      Nose: Nose normal.      Mouth/Throat:      Mouth: Mucous membranes are moist.   Eyes:      Extraocular Movements: Extraocular movements intact.      Conjunctiva/sclera: Conjunctivae normal.      Pupils: Pupils are equal, round, and reactive to light.   Cardiovascular:      Rate and Rhythm: Normal rate and regular rhythm.   Pulmonary:      Effort: Pulmonary effort is normal.      Breath sounds: Normal breath sounds. No wheezing or rales.   Abdominal:      General: Abdomen is flat.  There is no distension.      Palpations: Abdomen is soft.      Tenderness: There is no abdominal tenderness. There is no guarding.   Musculoskeletal:         General: Normal range of motion.      Cervical back: Normal range of motion and neck supple.      Right lower leg: No edema.      Left lower leg: No edema.   Lymphadenopathy:      Cervical: No cervical adenopathy.   Skin:     General: Skin is warm and dry.   Neurological:      General: No focal deficit present.      Mental Status: She is alert. Mental status is at baseline.      Comments: No gross focal neurologic deficits   Psychiatric:         Mood and Affect: Mood normal.         Behavior: Behavior normal.         Thought Content: Thought content normal.          LAB:  All pertinent labs reviewed and interpreted.  Results for orders placed or performed during the hospital encounter of 06/25/23   XR Chest Port 1 View    Impression    IMPRESSION: Negative chest.   Basic metabolic panel   Result Value Ref Range    Sodium 128 (L) 136 - 145 mmol/L    Potassium 4.6 3.4 - 5.3 mmol/L    Chloride 94 (L) 98 - 107 mmol/L    Carbon Dioxide (CO2) 24 22 - 29 mmol/L    Anion Gap 10 7 - 15 mmol/L    Urea Nitrogen 23.5 (H) 8.0 - 23.0 mg/dL    Creatinine 1.24 (H) 0.51 - 0.95 mg/dL    Calcium 9.2 8.8 - 10.2 mg/dL    Glucose 106 (H) 70 - 99 mg/dL    GFR Estimate 45 (L) >60 mL/min/1.73m2   Hepatic function panel   Result Value Ref Range    Protein Total 6.9 6.4 - 8.3 g/dL    Albumin 3.8 3.5 - 5.2 g/dL    Bilirubin Total 0.3 <=1.2 mg/dL    Alkaline Phosphatase 57 35 - 104 U/L    AST 37 0 - 45 U/L    ALT 22 0 - 50 U/L    Bilirubin Direct <0.20 0.00 - 0.30 mg/dL   Result Value Ref Range    Magnesium 1.4 (L) 1.7 - 2.3 mg/dL   UA with Microscopic reflex to Culture    Specimen: Urine, Catheter   Result Value Ref Range    Color Urine Light Yellow Colorless, Straw, Light Yellow, Yellow    Appearance Urine Clear Clear    Glucose Urine Negative Negative mg/dL    Bilirubin Urine Negative  Negative    Ketones Urine Trace (A) Negative mg/dL    Specific Gravity Urine 1.013 1.001 - 1.030    Blood Urine Negative Negative    pH Urine 5.5 5.0 - 7.0    Protein Albumin Urine Negative Negative mg/dL    Urobilinogen Urine <2.0 <2.0 mg/dL    Nitrite Urine Negative Negative    Leukocyte Esterase Urine Negative Negative    Mucus Urine Present (A) None Seen /LPF    RBC Urine 0 <=2 /HPF    WBC Urine <1 <=5 /HPF   CBC with platelets and differential   Result Value Ref Range    WBC Count 4.7 4.0 - 11.0 10e3/uL    RBC Count 3.90 3.80 - 5.20 10e6/uL    Hemoglobin 12.5 11.7 - 15.7 g/dL    Hematocrit 36.4 35.0 - 47.0 %    MCV 93 78 - 100 fL    MCH 32.1 26.5 - 33.0 pg    MCHC 34.3 31.5 - 36.5 g/dL    RDW 11.3 10.0 - 15.0 %    Platelet Count 210 150 - 450 10e3/uL    % Neutrophils 52 %    % Lymphocytes 31 %    % Monocytes 14 %    % Eosinophils 3 %    % Basophils 0 %    % Immature Granulocytes 0 %    NRBCs per 100 WBC 0 <1 /100    Absolute Neutrophils 2.4 1.6 - 8.3 10e3/uL    Absolute Lymphocytes 1.5 0.8 - 5.3 10e3/uL    Absolute Monocytes 0.6 0.0 - 1.3 10e3/uL    Absolute Eosinophils 0.1 0.0 - 0.7 10e3/uL    Absolute Basophils 0.0 0.0 - 0.2 10e3/uL    Absolute Immature Granulocytes 0.0 <=0.4 10e3/uL    Absolute NRBCs 0.0 10e3/uL   Asymptomatic Influenza A/B, RSV, & SARS-CoV2 PCR (COVID-19) Nasopharyngeal    Specimen: Nasopharyngeal; Swab   Result Value Ref Range    Influenza A PCR Negative Negative    Influenza B PCR Negative Negative    RSV PCR Negative Negative    SARS CoV2 PCR Negative Negative   TSH with free T4 reflex   Result Value Ref Range    TSH 0.12 (L) 0.30 - 4.20 uIU/mL   Result Value Ref Range    Free T4 1.77 (H) 0.90 - 1.70 ng/dL       RADIOLOGY:  Reviewed all pertinent imaging. Please see official radiology report.  XR Chest Port 1 View   Final Result   IMPRESSION: Negative chest.          IChantal, am serving as a scribe to document services personally performed by Dr. Juares based on my  observation and the provider's statements to me. I, Joce Juares MD attest that Chantal Wade is acting in a scribe capacity, has observed my performance of the services and has documented them in accordance with my direction.    Joce Juares M.D.  Emergency Medicine  [facility] EMERGENCY DEPARTMENT  [address] [place] [id number]  [phone]  Dept: [phone]     Joce Juares MD  06/25/23 0816       Joce Juares MD  06/25/23 0125

## 2023-06-25 NOTE — DISCHARGE INSTRUCTIONS
Start magnesium replacement as prescribed.  Patient was given first dose in the emergency department    Recheck magnesium and basic metabolic panel in 2 days    Offer patient fluids currently and have easily available.  Patient drank at least 4 glasses of water when offered in the emergency department.

## 2023-06-25 NOTE — ED TRIAGE NOTES
Pt complains of being cold, failure to thrive. Has not been eating for the  Last few days per nursing home.      Triage Assessment     Row Name 06/25/23 5915       Triage Assessment (Adult)    Airway WDL WDL       Respiratory WDL    Respiratory WDL WDL       Skin Circulation/Temperature WDL    Skin Circulation/Temperature WDL WDL       Cardiac WDL    Cardiac WDL WDL       Peripheral/Neurovascular WDL    Peripheral Neurovascular WDL WDL       Cognitive/Neuro/Behavioral WDL    Cognitive/Neuro/Behavioral WDL X;orientation    Orientation disoriented to;place;time;situation    Speech rambling    Mood/Behavior agitated       Pupils (CN II)    Pupil PERRLA yes    Pupil Size Left 2 mm    Pupil Size Right 2 mm       [place] Coma Scale    Best Eye Response 4-->(E4) spontaneous    Best Motor Response 6-->(M6) obeys commands    Best Verbal Response 4-->(V4) confused    [place] Coma Scale Score 14

## 2023-06-25 NOTE — ED NOTES
Bed: JNED-27  Expected date: 6/25/23  Expected time: 11:17 AM  Means of arrival:   Comments:  Memory care/annika. chills